# Patient Record
Sex: MALE | Race: WHITE
[De-identification: names, ages, dates, MRNs, and addresses within clinical notes are randomized per-mention and may not be internally consistent; named-entity substitution may affect disease eponyms.]

---

## 2017-03-02 ENCOUNTER — RECORD ABSTRACTING (OUTPATIENT)
Age: 82
End: 2017-03-02

## 2017-03-02 DIAGNOSIS — Z87.891 PERSONAL HISTORY OF NICOTINE DEPENDENCE: ICD-10-CM

## 2017-03-02 DIAGNOSIS — Z85.038 PERSONAL HISTORY OF OTHER MALIGNANT NEOPLASM OF LARGE INTESTINE: ICD-10-CM

## 2017-03-02 DIAGNOSIS — Z83.42 FAMILY HISTORY OF FAMILIAL HYPERCHOLESTEROLEMIA: ICD-10-CM

## 2017-03-02 DIAGNOSIS — Z95.0 PRESENCE OF CARDIAC PACEMAKER: ICD-10-CM

## 2017-03-02 DIAGNOSIS — E78.5 HYPERLIPIDEMIA, UNSPECIFIED: ICD-10-CM

## 2017-03-02 DIAGNOSIS — Z82.49 FAMILY HISTORY OF ISCHEMIC HEART DISEASE AND OTHER DISEASES OF THE CIRCULATORY SYSTEM: ICD-10-CM

## 2017-03-02 DIAGNOSIS — I10 ESSENTIAL (PRIMARY) HYPERTENSION: ICD-10-CM

## 2019-02-15 ENCOUNTER — APPOINTMENT (OUTPATIENT)
Age: 84
End: 2019-02-15
Payer: MEDICARE

## 2019-02-15 VITALS
BODY MASS INDEX: 23.7 KG/M2 | WEIGHT: 160 LBS | DIASTOLIC BLOOD PRESSURE: 97 MMHG | HEART RATE: 70 BPM | SYSTOLIC BLOOD PRESSURE: 164 MMHG | HEIGHT: 69 IN

## 2019-02-15 DIAGNOSIS — R49.0 DYSPHONIA: ICD-10-CM

## 2019-02-15 DIAGNOSIS — R09.82 POSTNASAL DRIP: ICD-10-CM

## 2019-02-15 DIAGNOSIS — K21.9 GASTRO-ESOPHAGEAL REFLUX DISEASE W/OUT ESOPHAGITIS: ICD-10-CM

## 2019-02-15 PROCEDURE — 31575 DIAGNOSTIC LARYNGOSCOPY: CPT

## 2019-02-15 PROCEDURE — 99213 OFFICE O/P EST LOW 20 MIN: CPT | Mod: 25

## 2019-02-15 RX ORDER — METOPROLOL TARTRATE 25 MG/1
25 TABLET, FILM COATED ORAL
Refills: 0 | Status: ACTIVE | COMMUNITY

## 2019-02-15 RX ORDER — AZELASTINE HYDROCHLORIDE 137 UG/1
137 SPRAY, METERED NASAL
Refills: 0 | Status: ACTIVE | COMMUNITY

## 2019-02-15 RX ORDER — AZELASTINE HYDROCHLORIDE 137 UG/1
0.1 SPRAY, METERED NASAL TWICE DAILY
Qty: 1 | Refills: 5 | Status: ACTIVE | COMMUNITY
Start: 2019-02-15 | End: 1900-01-01

## 2019-02-15 RX ORDER — LOSARTAN POTASSIUM AND HYDROCHLOROTHIAZIDE 12.5; 5 MG/1; MG/1
50-12.5 TABLET ORAL
Refills: 0 | Status: ACTIVE | COMMUNITY

## 2019-02-15 RX ORDER — AMLODIPINE BESYLATE 5 MG/1
5 TABLET ORAL
Refills: 0 | Status: ACTIVE | COMMUNITY

## 2019-02-15 NOTE — HISTORY OF PRESENT ILLNESS
[de-identified] : co pnd and hoarseness\par food sticking in throat at times every 2-3 days\par thick mucous throat\par similar co evaluated 19 mo ago

## 2019-02-15 NOTE — ASSESSMENT
[FreeTextEntry1] : exam unremarkable \par mild esophageal motility disorder\par pnd\par renew azelastine spray

## 2019-02-15 NOTE — PHYSICAL EXAM
[Laryngoscopy Performed] : laryngoscopy was performed, see procedure section for findings [Normal] : no rashes

## 2019-02-15 NOTE — PROCEDURE
[de-identified] : Scope # 13\par Topical anesthesia with viscous xylocaine 2% is applied to the anterior nares.\par A flexible fiberoptic laryngoscope is than introduced through the nares.\par The nasopharynx is clear without mass or inflammation.\par The posterior pharyngeal wall is unremarkable.\par The tongue base and vallecula are unremarkable.\par The supraglottic larynx is within normal limits.\par Both vocal cords are fully mobile with no nodule, polyp or other lesion present.\par There is no edema or erythema overlying the arytenoid cartilages or the inter-arytenoid space.\par The subglottic space is clear.\par The voice has a  mild raspy quality.\par

## 2019-02-15 NOTE — REVIEW OF SYSTEMS
[Post Nasal Drip] : post nasal drip [Hearing Loss] : hearing loss [Problem Snoring] : problem snoring [Hoarseness] : hoarseness [Throat Clearing] : throat clearing [Negative] : Heme/Lymph [Patient Intake Form Reviewed] : Patient intake form was reviewed [FreeTextEntry1] : difficulty swallowing  joint acches  muscle aches  itching

## 2020-04-10 ENCOUNTER — APPOINTMENT (OUTPATIENT)
Dept: GASTROENTEROLOGY | Facility: CLINIC | Age: 85
End: 2020-04-10
Payer: MEDICARE

## 2020-04-10 PROCEDURE — 99441: CPT

## 2020-07-29 ENCOUNTER — APPOINTMENT (OUTPATIENT)
Dept: GASTROENTEROLOGY | Facility: CLINIC | Age: 85
End: 2020-07-29
Payer: MEDICARE

## 2020-07-29 VITALS
DIASTOLIC BLOOD PRESSURE: 75 MMHG | HEART RATE: 84 BPM | HEIGHT: 69 IN | WEIGHT: 140 LBS | BODY MASS INDEX: 20.73 KG/M2 | SYSTOLIC BLOOD PRESSURE: 170 MMHG

## 2020-07-29 DIAGNOSIS — K59.09 OTHER CONSTIPATION: ICD-10-CM

## 2020-07-29 PROCEDURE — 99213 OFFICE O/P EST LOW 20 MIN: CPT

## 2020-07-29 RX ORDER — HYDROCHLOROTHIAZIDE 25 MG/1
25 TABLET ORAL
Refills: 0 | Status: ACTIVE | COMMUNITY

## 2020-07-29 NOTE — ASSESSMENT
[FreeTextEntry1] : 89 yo male with constipation ?medication related. Will increase water and fiber. Add daily Benefiber to regimen. Patient to call back if no improvement in symptoms.

## 2020-07-29 NOTE — HISTORY OF PRESENT ILLNESS
[de-identified] : 89 yo male with history of colon cancer status post resection several years ago. Patient was started on medications for overactive bladder and has noted some alternating constipation and diarrhea. There's been no other significant change in his medical history. Weight has remained stable. Patient does not wish to pursue invasive workup.

## 2021-01-01 ENCOUNTER — TRANSCRIPTION ENCOUNTER (OUTPATIENT)
Age: 86
End: 2021-01-01

## 2021-01-01 ENCOUNTER — OUTPATIENT (OUTPATIENT)
Dept: INPATIENT UNIT | Facility: HOSPITAL | Age: 86
LOS: 1 days | Discharge: HOME | End: 2021-01-01
Payer: MEDICARE

## 2021-01-01 ENCOUNTER — APPOINTMENT (OUTPATIENT)
Dept: DISASTER EMERGENCY | Facility: CLINIC | Age: 86
End: 2021-01-01

## 2021-01-01 ENCOUNTER — OUTPATIENT (OUTPATIENT)
Dept: OUTPATIENT SERVICES | Facility: HOSPITAL | Age: 86
LOS: 1 days | End: 2021-01-01
Payer: MEDICARE

## 2021-01-01 ENCOUNTER — RESULT REVIEW (OUTPATIENT)
Age: 86
End: 2021-01-01

## 2021-01-01 ENCOUNTER — EMERGENCY (EMERGENCY)
Facility: HOSPITAL | Age: 86
LOS: 0 days | Discharge: HOME | End: 2021-09-18
Attending: EMERGENCY MEDICINE
Payer: MEDICARE

## 2021-01-01 ENCOUNTER — EMERGENCY (EMERGENCY)
Facility: HOSPITAL | Age: 86
LOS: 0 days | Discharge: HOME | End: 2021-09-24
Attending: EMERGENCY MEDICINE
Payer: MEDICARE

## 2021-01-01 VITALS
DIASTOLIC BLOOD PRESSURE: 87 MMHG | TEMPERATURE: 98 F | OXYGEN SATURATION: 98 % | RESPIRATION RATE: 17 BRPM | HEART RATE: 68 BPM | SYSTOLIC BLOOD PRESSURE: 144 MMHG

## 2021-01-01 VITALS
OXYGEN SATURATION: 100 % | HEIGHT: 69 IN | WEIGHT: 132.28 LBS | SYSTOLIC BLOOD PRESSURE: 123 MMHG | DIASTOLIC BLOOD PRESSURE: 75 MMHG | HEART RATE: 67 BPM | RESPIRATION RATE: 15 BRPM | TEMPERATURE: 97 F

## 2021-01-01 VITALS
TEMPERATURE: 97 F | OXYGEN SATURATION: 99 % | SYSTOLIC BLOOD PRESSURE: 116 MMHG | RESPIRATION RATE: 18 BRPM | HEART RATE: 62 BPM | DIASTOLIC BLOOD PRESSURE: 66 MMHG

## 2021-01-01 VITALS
HEIGHT: 69 IN | TEMPERATURE: 97 F | WEIGHT: 139.99 LBS | DIASTOLIC BLOOD PRESSURE: 92 MMHG | OXYGEN SATURATION: 97 % | SYSTOLIC BLOOD PRESSURE: 149 MMHG | RESPIRATION RATE: 20 BRPM | HEART RATE: 70 BPM

## 2021-01-01 VITALS
SYSTOLIC BLOOD PRESSURE: 141 MMHG | RESPIRATION RATE: 18 BRPM | HEART RATE: 76 BPM | TEMPERATURE: 98 F | DIASTOLIC BLOOD PRESSURE: 72 MMHG | OXYGEN SATURATION: 99 %

## 2021-01-01 VITALS — HEIGHT: 69 IN | WEIGHT: 139.99 LBS

## 2021-01-01 DIAGNOSIS — Z96.643 PRESENCE OF ARTIFICIAL HIP JOINT, BILATERAL: Chronic | ICD-10-CM

## 2021-01-01 DIAGNOSIS — Z96.643 PRESENCE OF ARTIFICIAL HIP JOINT, BILATERAL: ICD-10-CM

## 2021-01-01 DIAGNOSIS — Z90.49 ACQUIRED ABSENCE OF OTHER SPECIFIED PARTS OF DIGESTIVE TRACT: Chronic | ICD-10-CM

## 2021-01-01 DIAGNOSIS — Z98.890 OTHER SPECIFIED POSTPROCEDURAL STATES: Chronic | ICD-10-CM

## 2021-01-01 DIAGNOSIS — Z95.0 PRESENCE OF CARDIAC PACEMAKER: Chronic | ICD-10-CM

## 2021-01-01 DIAGNOSIS — Z85.038 PERSONAL HISTORY OF OTHER MALIGNANT NEOPLASM OF LARGE INTESTINE: ICD-10-CM

## 2021-01-01 DIAGNOSIS — I45.9 CONDUCTION DISORDER, UNSPECIFIED: ICD-10-CM

## 2021-01-01 DIAGNOSIS — Z88.0 ALLERGY STATUS TO PENICILLIN: ICD-10-CM

## 2021-01-01 DIAGNOSIS — S01.81XD LACERATION WITHOUT FOREIGN BODY OF OTHER PART OF HEAD, SUBSEQUENT ENCOUNTER: ICD-10-CM

## 2021-01-01 DIAGNOSIS — Z95.0 PRESENCE OF CARDIAC PACEMAKER: ICD-10-CM

## 2021-01-01 DIAGNOSIS — Z90.49 ACQUIRED ABSENCE OF OTHER SPECIFIED PARTS OF DIGESTIVE TRACT: ICD-10-CM

## 2021-01-01 DIAGNOSIS — K43.6 OTHER AND UNSPECIFIED VENTRAL HERNIA WITH OBSTRUCTION, WITHOUT GANGRENE: ICD-10-CM
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
## 2021-01-01 DIAGNOSIS — Z98.890 OTHER SPECIFIED POSTPROCEDURAL STATES: ICD-10-CM

## 2021-01-01 DIAGNOSIS — K43.9 VENTRAL HERNIA WITHOUT OBSTRUCTION OR GANGRENE: ICD-10-CM

## 2021-01-01 DIAGNOSIS — Z01.818 ENCOUNTER FOR OTHER PREPROCEDURAL EXAMINATION: ICD-10-CM
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
## 2021-01-01 DIAGNOSIS — S61.011A LACERATION WITHOUT FOREIGN BODY OF RIGHT THUMB WITHOUT DAMAGE TO NAIL, INITIAL ENCOUNTER: ICD-10-CM

## 2021-01-01 DIAGNOSIS — S01.81XA LACERATION WITHOUT FOREIGN BODY OF OTHER PART OF HEAD, INITIAL ENCOUNTER: ICD-10-CM

## 2021-01-01 DIAGNOSIS — S09.90XA UNSPECIFIED INJURY OF HEAD, INITIAL ENCOUNTER: ICD-10-CM

## 2021-01-01 DIAGNOSIS — G62.9 POLYNEUROPATHY, UNSPECIFIED: ICD-10-CM

## 2021-01-01 DIAGNOSIS — I10 ESSENTIAL (PRIMARY) HYPERTENSION: ICD-10-CM

## 2021-01-01 DIAGNOSIS — E78.5 HYPERLIPIDEMIA, UNSPECIFIED: ICD-10-CM

## 2021-01-01 DIAGNOSIS — S01.111A LACERATION WITHOUT FOREIGN BODY OF RIGHT EYELID AND PERIOCULAR AREA, INITIAL ENCOUNTER: ICD-10-CM

## 2021-01-01 LAB
ANION GAP SERPL CALC-SCNC: 5 MMOL/L — SIGNIFICANT CHANGE UP (ref 5–17)
BASOPHILS # BLD AUTO: 0.02 K/UL — SIGNIFICANT CHANGE UP (ref 0–0.2)
BASOPHILS NFR BLD AUTO: 0.3 % — SIGNIFICANT CHANGE UP (ref 0–2)
BUN SERPL-MCNC: 33 MG/DL — HIGH (ref 7–23)
CALCIUM SERPL-MCNC: 10.9 MG/DL — HIGH (ref 8.5–10.1)
CHLORIDE SERPL-SCNC: 103 MMOL/L — SIGNIFICANT CHANGE UP (ref 96–108)
CO2 SERPL-SCNC: 26 MMOL/L — SIGNIFICANT CHANGE UP (ref 22–31)
CREAT SERPL-MCNC: 1.55 MG/DL — HIGH (ref 0.5–1.3)
EOSINOPHIL # BLD AUTO: 0.04 K/UL — SIGNIFICANT CHANGE UP (ref 0–0.5)
EOSINOPHIL NFR BLD AUTO: 0.6 % — SIGNIFICANT CHANGE UP (ref 0–6)
GLUCOSE SERPL-MCNC: 91 MG/DL — SIGNIFICANT CHANGE UP (ref 70–99)
HCT VFR BLD CALC: 41.1 % — SIGNIFICANT CHANGE UP (ref 39–50)
HGB BLD-MCNC: 13.4 G/DL — SIGNIFICANT CHANGE UP (ref 13–17)
IMM GRANULOCYTES NFR BLD AUTO: 0.3 % — SIGNIFICANT CHANGE UP (ref 0–1.5)
LYMPHOCYTES # BLD AUTO: 0.73 K/UL — LOW (ref 1–3.3)
LYMPHOCYTES # BLD AUTO: 11 % — LOW (ref 13–44)
MCHC RBC-ENTMCNC: 30.2 PG — SIGNIFICANT CHANGE UP (ref 27–34)
MCHC RBC-ENTMCNC: 32.6 GM/DL — SIGNIFICANT CHANGE UP (ref 32–36)
MCV RBC AUTO: 92.6 FL — SIGNIFICANT CHANGE UP (ref 80–100)
MONOCYTES # BLD AUTO: 0.61 K/UL — SIGNIFICANT CHANGE UP (ref 0–0.9)
MONOCYTES NFR BLD AUTO: 9.2 % — SIGNIFICANT CHANGE UP (ref 2–14)
MRSA PCR RESULT.: SIGNIFICANT CHANGE UP
NEUTROPHILS # BLD AUTO: 5.22 K/UL — SIGNIFICANT CHANGE UP (ref 1.8–7.4)
NEUTROPHILS NFR BLD AUTO: 78.6 % — HIGH (ref 43–77)
PLATELET # BLD AUTO: 172 K/UL — SIGNIFICANT CHANGE UP (ref 150–400)
POTASSIUM SERPL-MCNC: 4.1 MMOL/L — SIGNIFICANT CHANGE UP (ref 3.5–5.3)
POTASSIUM SERPL-SCNC: 4.1 MMOL/L — SIGNIFICANT CHANGE UP (ref 3.5–5.3)
RBC # BLD: 4.44 M/UL — SIGNIFICANT CHANGE UP (ref 4.2–5.8)
RBC # FLD: 14.6 % — HIGH (ref 10.3–14.5)
S AUREUS DNA NOSE QL NAA+PROBE: SIGNIFICANT CHANGE UP
SARS-COV-2 N GENE NPH QL NAA+PROBE: NOT DETECTED
SODIUM SERPL-SCNC: 134 MMOL/L — LOW (ref 135–145)
WBC # BLD: 6.64 K/UL — SIGNIFICANT CHANGE UP (ref 3.8–10.5)
WBC # FLD AUTO: 6.64 K/UL — SIGNIFICANT CHANGE UP (ref 3.8–10.5)

## 2021-01-01 PROCEDURE — 80048 BASIC METABOLIC PNL TOTAL CA: CPT

## 2021-01-01 PROCEDURE — 86921 COMPATIBILITY TEST INCUBATE: CPT

## 2021-01-01 PROCEDURE — 86077 PHYS BLOOD BANK SERV XMATCH: CPT

## 2021-01-01 PROCEDURE — 87641 MR-STAPH DNA AMP PROBE: CPT

## 2021-01-01 PROCEDURE — 86870 RBC ANTIBODY IDENTIFICATION: CPT

## 2021-01-01 PROCEDURE — 88302 TISSUE EXAM BY PATHOLOGIST: CPT | Mod: 26

## 2021-01-01 PROCEDURE — 72125 CT NECK SPINE W/O DYE: CPT

## 2021-01-01 PROCEDURE — 86922 COMPATIBILITY TEST ANTIGLOB: CPT

## 2021-01-01 PROCEDURE — 49560: CPT | Mod: AS

## 2021-01-01 PROCEDURE — 88302 TISSUE EXAM BY PATHOLOGIST: CPT

## 2021-01-01 PROCEDURE — L9995: CPT

## 2021-01-01 PROCEDURE — G0463: CPT

## 2021-01-01 PROCEDURE — 86850 RBC ANTIBODY SCREEN: CPT

## 2021-01-01 PROCEDURE — 12014 RPR F/E/E/N/L/M 5.1-7.5 CM: CPT

## 2021-01-01 PROCEDURE — 85025 COMPLETE CBC W/AUTO DIFF WBC: CPT

## 2021-01-01 PROCEDURE — 86902 BLOOD TYPE ANTIGEN DONOR EA: CPT

## 2021-01-01 PROCEDURE — 36415 COLL VENOUS BLD VENIPUNCTURE: CPT

## 2021-01-01 PROCEDURE — 87640 STAPH A DNA AMP PROBE: CPT

## 2021-01-01 PROCEDURE — 70450 CT HEAD/BRAIN W/O DYE: CPT | Mod: 26,MA

## 2021-01-01 PROCEDURE — 99284 EMERGENCY DEPT VISIT MOD MDM: CPT | Mod: 25

## 2021-01-01 PROCEDURE — 86920 COMPATIBILITY TEST SPIN: CPT

## 2021-01-01 PROCEDURE — C1781: CPT

## 2021-01-01 PROCEDURE — 49568: CPT | Mod: AS

## 2021-01-01 PROCEDURE — 86880 COOMBS TEST DIRECT: CPT

## 2021-01-01 PROCEDURE — 72125 CT NECK SPINE W/O DYE: CPT | Mod: 26,MA

## 2021-01-01 PROCEDURE — 86900 BLOOD TYPING SEROLOGIC ABO: CPT

## 2021-01-01 PROCEDURE — 86901 BLOOD TYPING SEROLOGIC RH(D): CPT

## 2021-01-01 PROCEDURE — 70450 CT HEAD/BRAIN W/O DYE: CPT

## 2021-01-01 RX ORDER — SODIUM CHLORIDE 9 MG/ML
1000 INJECTION, SOLUTION INTRAVENOUS
Refills: 0 | Status: DISCONTINUED | OUTPATIENT
Start: 2021-01-01 | End: 2021-01-01

## 2021-01-01 RX ORDER — FENTANYL CITRATE 50 UG/ML
25 INJECTION INTRAVENOUS
Refills: 0 | Status: DISCONTINUED | OUTPATIENT
Start: 2021-01-01 | End: 2021-01-01

## 2021-01-01 RX ORDER — ONDANSETRON 8 MG/1
4 TABLET, FILM COATED ORAL ONCE
Refills: 0 | Status: DISCONTINUED | OUTPATIENT
Start: 2021-01-01 | End: 2021-01-01

## 2021-01-01 RX ORDER — OXYCODONE HYDROCHLORIDE 5 MG/1
10 TABLET ORAL ONCE
Refills: 0 | Status: DISCONTINUED | OUTPATIENT
Start: 2021-01-01 | End: 2021-01-01

## 2021-01-01 RX ORDER — ACETAMINOPHEN 500 MG
1000 TABLET ORAL ONCE
Refills: 0 | Status: DISCONTINUED | OUTPATIENT
Start: 2021-01-01 | End: 2021-01-01

## 2021-01-01 RX ORDER — OXYCODONE HYDROCHLORIDE 5 MG/1
5 TABLET ORAL ONCE
Refills: 0 | Status: DISCONTINUED | OUTPATIENT
Start: 2021-01-01 | End: 2021-01-01

## 2021-01-01 RX ADMIN — SODIUM CHLORIDE 100 MILLILITER(S): 9 INJECTION, SOLUTION INTRAVENOUS at 14:54

## 2021-07-17 PROBLEM — Z01.818 PREOP TESTING: Status: ACTIVE | Noted: 2021-01-01

## 2021-07-19 NOTE — ASU PATIENT PROFILE, ADULT - PMH
BPH (benign prostatic hyperplasia)    Colon cancer  h/o chemo 2004  Heart block    HLD (hyperlipidemia)    HTN (hypertension)    Pacemaker    Ventral hernia

## 2021-07-19 NOTE — ASU PATIENT PROFILE, ADULT - PSH
Cardiac pacemaker    H/O colectomy  2004  H/O hernia repair    History of appendectomy  ruptured  History of carpal tunnel surgery of left wrist    History of total hip replacement, bilateral

## 2021-07-19 NOTE — CHART NOTE - NSCHARTNOTEFT_GEN_A_CORE
89 year old male presents to PST for planned ventral hernia repair    Plan:  1. PST instructions given ; NPO status instructions to be given by ASU   2. Pt instructed to take following meds with sip of water :   3. Pt instructed to take routine evening medications unless indicated   4. Stop NSAIDS ( Aspirin Alev Motrin Mobic Diclofenac), herbal supplements , MVI , Vitamin fish oil 7 days prior to surgery  unless   directed by surgeon or cardiologist;   5. Medical Optimization  with    6. EZ wash instructions given & mupirocin instructions given  7. Labs EKG CXR as per surgeon request   8. Pt instructed to self quarantine after Covid test   9. Covid Testing scheduled Pt notified and aware  10. Pt denies covid symptoms shortness of breath fever cough 89 year old male presents to PST for planned ventral hernia repair    Plan:  1. PST instructions given ; NPO status instructions to be given by ASU   2. Pt instructed to take following meds with sip of water : losartan metoprolol alfuzosin amlodipine   3. Pt instructed to take routine evening medications unless indicated   4. Stop NSAIDS ( Aspirin Alev Motrin Mobic Diclofenac), herbal supplements , MVI , Vitamin fish oil 7 days prior to surgery  unless   directed by surgeon or cardiologist;   5. Medical Optimization  with Dr Luigi Ronquillo   6. EZ wash instructions given & mupirocin instructions given  7. Labs EKG as per surgeon request   8. Pt instructed to self quarantine after Covid test   9. Covid Testing scheduled Pt notified and aware  10. Pt denies covid symptoms shortness of breath fever cough

## 2021-07-21 NOTE — ASU DISCHARGE PLAN (ADULT/PEDIATRIC) - CARE PROVIDER_API CALL
Jorge Pete  SURGERY  [address] [place] [address]  Phone: [phone]  Fax: [phone]  Follow Up Time:

## 2021-09-18 NOTE — ED PROVIDER NOTE - NSFOLLOWUPINSTRUCTIONS_ED_ALL_ED_FT
Please call and follow up with your doctor in 1-3 days.    You have 10 stitches that will need to be removed in 5 days.    Head Injury    WHAT YOU NEED TO KNOW:    A head injury is most often caused by a blow to the head. This may occur from a fall, bicycle injury, sports injury, being struck in the head, or a motor vehicle accident.     DISCHARGE INSTRUCTIONS:    Call 911 or have someone else call for any of the following:     You cannot be woken.      You have a seizure.      You stop responding to others or you faint.      You have blurry or double vision.      Your speech becomes slurred or confused.      You have arm or leg weakness, loss of feeling, or new problems with coordination.      Your pupils are larger than usual or one pupil is a different size than the other.       You have blood or clear fluid coming out of your ears or nose.    Return to the emergency department if:     You have repeated or forceful vomiting.      You feel confused.      Your headache gets worse or becomes severe.      You or someone caring for you notices that you are harder to wake than usual.    Contact your healthcare provider if:     Your symptoms last longer than 6 weeks after the injury.      You have questions or concerns about your condition or care.    Medicines:     Acetaminophen decreases pain. Acetaminophen is available without a doctor's order. Ask how much to take and how often to take it. Follow directions. Acetaminophen can cause liver damage if not taken correctly.      Take your medicine as directed. Contact your healthcare provider if you think your medicine is not helping or if you have side effects. Tell him or her if you are allergic to any medicine. Keep a list of the medicines, vitamins, and herbs you take. Include the amounts, and when and why you take them. Bring the list or the pill bottles to follow-up visits. Carry your medicine list with you in case of an emergency.    Self-care:     Rest or do quiet activities for 24 to 48 hours. Limit your time watching TV, using the computer, or doing tasks that require a lot of thinking. Slowly return to your normal activities as directed. Do not play sports or do activities that may cause you to get hit in the head. Ask your healthcare provider when you can return to sports.       Apply ice on your head for 15 to 20 minutes every hour or as directed. Use an ice pack, or put crushed ice in a plastic bag. Cover it with a towel before you apply it to your skin. Ice helps prevent tissue damage and decreases swelling and pain.       Have someone stay with you for 24 hours or as directed. This person can monitor you for complications and call 911. When you are awake the person should ask you a few questions to see if you are thinking clearly. An example would be to ask your name or your address.     Prevent another head injury:     Wear a helmet that fits properly. Do this when you play sports, or ride a bike, scooter, or skateboard. Helmets help decrease your risk of a serious head injury. Talk to your healthcare provider about other ways you can protect yourself if you play sports.      Wear your seat belt every time you are in a car. This helps to decrease your risk for a head injury if you are in a car accident.     Follow up with your healthcare provider as directed: Write down your questions so you remember to ask them during your visits.    Laceration    WHAT YOU NEED TO KNOW:    A laceration is an injury to the skin and the soft tissue underneath it. Lacerations happen when you are cut or hit by something. They can happen anywhere on the body.     DISCHARGE INSTRUCTIONS:    Return to the emergency department if:     You have heavy bleeding or bleeding that does not stop after 10 minutes of holding firm, direct pressure over the wound.       Your wound opens up.     Contact your healthcare provider if:     You have a fever or chills.       Your laceration is red, warm, or swollen.      You have red streaks on your skin coming from your wound.      You have white or yellow drainage from the wound that smells bad.      You have pain that gets worse, even after treatment.       You have questions or concerns about your condition or care.     Medicines:     Prescription pain medicine may be given. Ask how to take this medicine safely.       Antibiotics help treat or prevent a bacterial infection.       Take your medicine as directed. Contact your healthcare provider if you think your medicine is not helping or if you have side effects. Tell him or her if you are allergic to any medicine. Keep a list of the medicines, vitamins, and herbs you take. Include the amounts, and when and why you take them. Bring the list or the pill bottles to follow-up visits. Carry your medicine list with you in case of an emergency.    Care for your wound as directed:     Do not get your wound wet until your healthcare provider says it is okay. Do not soak your wound in water. Do not go swimming until your healthcare provider says it is okay. Carefully wash the wound with soap and water. Gently pat the area dry or allow it to air dry.       Change your bandages when they get wet, dirty, or after washing. Apply new, clean bandages as directed. Do not apply elastic bandages or tape too tight. Do not put powders or lotions over your incision.       Apply antibiotic ointment as directed. Your healthcare provider may give you antibiotic ointment to put over your wound if you have stitches. If you have strips of tape over your incision, let them dry up and fall off on their own. If they do not fall off within 14 days, gently remove them. If you have glue over your wound, do not remove or pick at it. If your glue comes off, do not replace it with glue that you have at home.       Check your wound every day for signs of infection such as swelling, redness, or pus.     Self-care:     Apply ice on your wound for 15 to 20 minutes every hour or as directed. Use an ice pack, or put crushed ice in a plastic bag. Cover it with a towel. Ice helps prevent tissue damage and decreases swelling and pain.      Use a splint as directed. A splint will decrease movement and stress on your wound. It may help it heal faster. A splint may be used for lacerations over joints or areas of your body that bend. Ask your healthcare provider how to apply and remove a splint.       Decrease scarring of your wound by applying ointments as directed. Do not apply ointments until your healthcare provider says it is okay. You may need to wait until your wound is healed. Ask which ointment to buy and how often to use it. After your wound is healed, use sunscreen over the area when you are out in the sun. You should do this for at least 6 months to 1 year after your injury.     Follow up with your healthcare provider as directed: You may need to follow up in 24 to 48 hours to have your wound checked for infection. You will need to return in 3 to 14 days if you have stitches or staples so they can be removed. Care for your wound as directed to prevent infection and help it heal. Write down your questions so you remember to ask them during your visits.

## 2021-09-18 NOTE — ED PROVIDER NOTE - PATIENT PORTAL LINK FT
You can access the FollowMyHealth Patient Portal offered by [facility] by registering at the following website: [email or web address]. By joining [employer]’s FollowMyHealth portal, you will also be able to view your health information using other applications (apps) compatible with our system.

## 2021-09-18 NOTE — ED PROCEDURE NOTE - ATTENDING CONTRIBUTION TO CARE
Attending Lawrence statement:  I was available in the Emergency Department throughout the entire procedure and I was present during the key portions of the lac repair

## 2021-09-18 NOTE — ED PROVIDER NOTE - NSSUBSTANCEUSE_GEN_ALL_CORE_SD
Identified pt with two pt identifiers(name and ). Reviewed record in preparation for visit and have obtained necessary documentation. No chief complaint on file. Health Maintenance Due   Topic    DTaP/Tdap/Td series (1 - Tdap)    FOBT Q 1 YEAR AGE 54-65     ZOSTER VACCINE AGE 60>     GLAUCOMA SCREENING Q2Y     Pneumococcal 65+ Low/Medium Risk (1 of 2 - PCV13)    MEDICARE YEARLY EXAM     Influenza Age 5 to Adult    Patient states he does not get flu vaccinations. Coordination of Care Questionnaire:  :   1) Have you been to an emergency room, urgent care clinic since your last visit? no   Hospitalized since your last visit? no             2. Have seen or consulted any other health care provider since your last visit? NO  If yes, where when, and reason for visit? 3) Do you have an Advanced Directive/ Living Will in place? NO  If yes, do we have a copy on file NO  If no, would you like information NO    Patient is accompanied by self I have received verbal consent from Citlalli Song III to discuss any/all medical information while they are present in the room. never used

## 2021-09-18 NOTE — ED PROVIDER NOTE - CARE PLAN
1 Principal Discharge DX:	Head injury  Secondary Diagnosis:	Facial laceration  Secondary Diagnosis:	Laceration of right thumb

## 2021-09-18 NOTE — ED PROVIDER NOTE - MUSCULOSKELETAL, MLM
Spine appears normal, range of motion is not limited, no muscle  tenderness. non TTP extremities,  non TTP to  thumb.

## 2021-09-18 NOTE — ED PROCEDURE NOTE - CPROC ED LACER REPAIR DETAIL1
The wound was explored to base in bloodless field./No foreign body/Multiple flaps were aligned./Non-extensive debridement was performed.

## 2021-09-18 NOTE — ED PROCEDURE NOTE - CPROC ED SITE PREP1
Miles Badillo is a 38 year old male patient.  There is no problem list on file for this patient.    Past Medical History:   Diagnosis Date   • Anxiety    • Sleep apnea    • Thyroid condition      Current Facility-Administered Medications   Medication Dose Route Frequency Provider Last Rate Last Dose   • AMIODarone (CORDARONE) 450 mg/250 mL dextrose 5% infusion  1 mg/min Intravenous Continuous Rao Gutierrez MD 16.7 mL/hr at 10/18/19 0802 0.5 mg/min at 10/18/19 0802   • metoPROLOL succinate (TOPROL-XL) ER tablet 150 mg  150 mg Oral BID Rao Gutierrez MD   150 mg at 10/18/19 0804   • dilTIAZem (TIAZAC,CARDIZEM CD) 24 hr capsule 360 mg  360 mg Oral Daily Rao Gutierrez MD   360 mg at 10/18/19 0804   • sertraline (ZOLOFT) tablet 25 mg  25 mg Oral Daily Scot Rodgers MD   25 mg at 10/18/19 0805   • LORazepam (ATIVAN) tablet 1 mg  1 mg Oral Q6H PRN Scot Rodgers MD   1 mg at 10/18/19 0805   • labetalol (NORMODYNE) injection 10 mg  10 mg Intravenous Q4H PRN Blake Crenshaw MD       • ketorolac (ACULAR) 0.5 % ophthalmic solution 1 drop  1 drop Left Eye 4x Daily Drew Bush MD       • sodium chloride 0.9 % flush bag 25 mL  25 mL Intravenous PRN Drew Bush MD       • sodium chloride (PF) 0.9 % injection 2 mL  2 mL Intracatheter 2 times per day Drew Bush MD   2 mL at 10/17/19 2145   • sodium chloride (NORMAL SALINE) 0.9 % bolus 500 mL  500 mL Intravenous PRN Drew Bush MD       • sodium chloride 0.9 % flush bag 500 mL  500 mL Intravenous PRN Drew Bush MD       • potassium CHLORIDE (KLOR-CON M) ruthann ER tablet 20 mEq  20 mEq Oral Q4H PRN Drew Bush MD   20 mEq at 10/18/19 0804   • potassium CHLORIDE (KLOR-CON) packet 20 mEq  20 mEq Per NG tube Q4H PRN Drew Bush MD       • potassium CHLORIDE 20 mEq/100mL IVPB premix  20 mEq Intravenous Q4H PRN Drew Bush MD       • potassium CHLORIDE (KLOR-CON M) ruthann ER tablet 40 mEq  40 mEq Oral Q4H PRN Drew  MEEK Bush MD       • potassium CHLORIDE (KLOR-CON) packet 40 mEq  40 mEq Per NG tube Q4H PRN Drew Bush MD       • potassium CHLORIDE 20 mEq/100mL IVPB premix  40 mEq Intravenous Q4H PRN Drew Bush MD       • magnesium sulfate 1 g in dextrose 5% 100 mL IVPB premix  1 g Intravenous Daily PRN Drew Bush MD       • magnesium sulfate 2 g in 50 mL premix IVPB  2 g Intravenous Daily PRN Drew Bush MD       • magnesium sulfate 2 g in 50 mL premix IVPB  2 g Intravenous Q4H PRN Drew Bush MD       • acetaminophen (TYLENOL) tablet 650 mg  650 mg Oral Q4H PRN Drew Bush MD       • prochlorperazine (COMPAZINE) injection 5 mg  5 mg Intravenous Q4H PRN Drew Bush MD       • enoxaparin (LOVENOX) injection 160 mg  1 mg/kg Subcutaneous Q12H Drew Bush MD   160 mg at 10/17/19 2145     ALLERGIES:  No Known Allergies  Active Problems:    * No active hospital problems. *    Blood pressure 165/89, pulse 133, temperature 98.4 °F (36.9 °C), temperature source Oral, resp. rate 18, height 5' 11\" (1.803 m), weight (!) 150.7 kg, SpO2 97 %.    Subjective:  Symptoms:  No shortness of breath or chest pain.      Objective:  Vital signs: (most recent): Blood pressure 165/89, pulse 133, temperature 98.4 °F (36.9 °C), temperature source Oral, resp. rate 18, height 5' 11\" (1.803 m), weight (!) 150.7 kg, SpO2 97 %.    Lungs:  Normal effort.  No rales or wheezes.    Heart: Tachycardia.  Irregular rhythm.  No murmur or gallop.   Extremities: There is no dependent edema.      Assessment:  (1. Atrial fibrillation-Persistent, and could not be electrically cardioverted to sinus rhythm yesterday. Rate not controlled,despite high dose of metoprolol and cardizem. Case discussed with Dr. Cantu ( electrophysiology). Per  Recommendations, we will increase amiodarone drip to 1 mg /minute for a total of 24 more hours, then change to high dose oral amiodarone .If rate is reasonably controlled, patient can be  discharged, to return to [facility] at a later day for another attempt at cardioversion. Continue Xarelto.).     Plan:   (See assessment section.  ).       Rao Gutierrez MD  10/18/2019     chlorhexidine

## 2021-09-18 NOTE — ED PROVIDER NOTE - PROGRESS NOTE DETAILS
Attending Lawrence, pt updated on results of ct scan.  pt to d/c home and follow up with PMD.  Pt need stitches to be removed in 5 days.

## 2021-09-18 NOTE — ED ADULT TRIAGE NOTE - CHIEF COMPLAINT QUOTE
Pt presents to er with complaints of falling forward and striking his head with laceration to right eye brow, pt denies loc, use of blood thinners, pt assessed by  and pt made neuro alert.

## 2021-09-18 NOTE — ED ADULT NURSE NOTE - OBJECTIVE STATEMENT
Patient BIBA complaining of fall. Patient complaining of fall approx 1 hour PTA, patient states he was walking dog, leash wrapped around ankle causing patient to fall foreward hitting R forehead, +laceration above R eye, bleeding controlled on arrival, -LOC, -anticoagulation. Patient complaining of pain to R forehead. Patient alert, able to move all extremities. Patient denies HA, dizziness, blurred vision, CP, SOB, GCS 15

## 2021-09-18 NOTE — ED ADULT NURSE NOTE - NSFALLRSKASSESSTYPE_ED_ALL_ED
Subjective:       Patient ID: Harriet Hernandez is a 70 y.o. female.    Chief Complaint: Follow-up    HPI     Here for a f/u.     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  Reports no more visual hallucinations but still seeing flashing lights and blurry vision.     bp elevated today.     Has chronic left lower back pain with radicular pain down to left anterior thigh > 3 months. Ps: 6-8/10 while on norco.      C/o chronic left cervical radicular pain > 1 year.  Taking norco for pain control. Ps: 6-8/10 while on norco. Needs rf of norco.  After reviewing la monitoring program, no aberrant behaviour noted.      Smoker. Re copd: reports coughing up more phlegm and wheezing more over the past 1 week. No fever.      Cad controlled. Sees Dr. Schofield           Review of Systems      Review of Systems   Constitutional: Negative for fever and chills.   HENT: Negative for hearing loss and neck stiffness.    Eyes: Negative for redness and itching.   Respiratory: Negative for cough and choking.    Cardiovascular: Negative for chest pain and leg swelling.  Abdomen: Negative for abdominal pain and blood in stool.   Genitourinary: Negative for dysuria and flank pain.   Neurological: Negative for light-headedness and headaches.   Hematological: Negative for adenopathy.   Psychiatric/Behavioral: Negative for behavioral problems.     Objective:      Physical Exam   Constitutional: She appears well-developed.   HENT:   Head: Normocephalic and atraumatic.   Eyes: Pupils are equal, round, and reactive to light. Conjunctivae are normal.   Neck: Normal range of motion.   Cardiovascular: Normal rate and regular rhythm.   No murmur heard.  Pulmonary/Chest: Effort normal and breath sounds normal.   Lymphadenopathy:     She has no cervical adenopathy.       Assessment:       1. Uncontrolled hypertension    2. COPD exacerbation    3. Left lumbar radiculopathy    4. Cervical radiculopathy    5. Tobacco abuse disorder    6.  Coronary artery disease involving native coronary artery of native heart without angina pectoris        Plan:       Uncontrolled hypertension    COPD exacerbation    Left lumbar radiculopathy    Cervical radiculopathy    Tobacco abuse disorder    Coronary artery disease involving native coronary artery of native heart without angina pectoris    Other orders  -     HYDROcodone-acetaminophen (NORCO)  mg per tablet; Take 1 tablet by mouth 3 (three) times daily as needed.  Dispense: 90 tablet; Refill: 0  -     HYDROcodone-acetaminophen (NORCO)  mg per tablet; Take 1 tablet by mouth 3 (three) times daily as needed.  Dispense: 90 tablet; Refill: 0  -     HYDROcodone-acetaminophen (NORCO)  mg per tablet; Take 1 tablet by mouth 3 (three) times daily as needed.  Dispense: 90 tablet; Refill: 0  -     lisinopril (PRINIVIL,ZESTRIL) 5 MG tablet; Take 1 tablet (5 mg total) by mouth once daily.  Dispense: 30 tablet; Refill: 11  -     predniSONE (DELTASONE) 10 MG tablet; Take 4 tablets (40 mg total) by mouth once daily for 2 days, THEN 3 tablets (30 mg total) once daily for 2 days, THEN 2 tablets (20 mg total) once daily for 2 days, THEN 1 tablet (10 mg total) once daily for 2 days, THEN stop.  Dispense: 20 tablet; Refill: 0  -     azithromycin (ZITHROMAX Z-PETR) 250 MG tablet; Take 2 tablets by mouth daily for first day, then 1 tablet by mouth daily for next 4 days.  Dispense: 6 tablet; Refill: 0            Plan:  Start lisinopril for bp control. Serial bp checks at home. Nurse bp check in 2-3 weeks  Start zpak and prednisone taper for copd exac  rf norco 10  Cont all other meds      Medication List with Changes/Refills   New Medications    AZITHROMYCIN (ZITHROMAX Z-PETR) 250 MG TABLET    Take 2 tablets by mouth daily for first day, then 1 tablet by mouth daily for next 4 days.    LISINOPRIL (PRINIVIL,ZESTRIL) 5 MG TABLET    Take 1 tablet (5 mg total) by mouth once daily.   Current Medications    ACETAMINOPHEN  (TYLENOL) 325 MG TABLET    Take 1 tablet (325 mg total) by mouth every 6 (six) hours as needed for Pain.    ALBUTEROL (PROVENTIL/VENTOLIN HFA) 90 MCG/ACTUATION INHALER    Inhale 2 puffs into the lungs every 4 (four) hours as needed for Wheezing. Rescue    AMLODIPINE (NORVASC) 5 MG TABLET    Take 1 tablet (5 mg total) by mouth once daily.    ASPIRIN (ECOTRIN) 81 MG EC TABLET    Take 81 mg by mouth once daily.    ATORVASTATIN (LIPITOR) 20 MG TABLET    Take 1 tablet (20 mg total) by mouth once daily.    CARVEDILOL (COREG) 6.25 MG TABLET    Take 1 tablet (6.25 mg total) by mouth 2 (two) times daily with meals.    CLOPIDOGREL (PLAVIX) 75 MG TABLET    Take 1 tablet (75 mg total) by mouth once daily.    DEXLANSOPRAZOLE (DEXILANT) 60 MG CAPSULE    Take 1 capsule (60 mg total) by mouth once daily.    DICLOFENAC SODIUM 1 % GEL    Apply 2 g topically once daily.    FENOFIBRATE (TRICOR) 54 MG TABLET    TAKE ONE TABLET BY MOUTH EVERY DAY    GABAPENTIN (NEURONTIN) 100 MG CAPSULE    Take 1 capsule (100 mg total) by mouth every evening.    NAPROXEN (EC NAPROSYN) 500 MG EC TABLET    Take 1 tablet (500 mg total) by mouth 2 (two) times daily with meals.    UMECLIDINIUM (INCRUSE ELLIPTA) 62.5 MCG/ACTUATION DSDV    Inhale 1 inhalation by mouth once a day. Controller   Changed and/or Refilled Medications    Modified Medication Previous Medication    HYDROCODONE-ACETAMINOPHEN (NORCO)  MG PER TABLET HYDROcodone-acetaminophen (NORCO)  mg per tablet       Take 1 tablet by mouth 3 (three) times daily as needed.    Take 1 tablet by mouth 3 (three) times daily as needed.    HYDROCODONE-ACETAMINOPHEN (NORCO)  MG PER TABLET HYDROcodone-acetaminophen (NORCO)  mg per tablet       Take 1 tablet by mouth 3 (three) times daily as needed.    Take 1 tablet by mouth 3 (three) times daily as needed.    HYDROCODONE-ACETAMINOPHEN (NORCO)  MG PER TABLET HYDROcodone-acetaminophen (NORCO)  mg per tablet       Take 1 tablet  by mouth 3 (three) times daily as needed.    Take 1 tablet by mouth 3 (three) times daily as needed.    PREDNISONE (DELTASONE) 10 MG TABLET predniSONE (DELTASONE) 10 MG tablet       Take 4 tablets (40 mg total) by mouth once daily for 2 days, THEN 3 tablets (30 mg total) once daily for 2 days, THEN 2 tablets (20 mg total) once daily for 2 days, THEN 1 tablet (10 mg total) once daily for 2 days, THEN stop.    Take 4 tabs by mouth daily for 2 days then Take 3 tabs daily for 2 days then Take 2 tabs daily for 2 days then Take 1 tab daily for 2 days then stop        Initial (On Arrival)

## 2021-09-18 NOTE — ED PROCEDURE NOTE - PROCEDURE ADDITIONAL DETAILS
V shaped laceration with two small lacerations medially  to it. Main laceration with 6 sutures placed. Small laceration superomedially with 3 stitches placed. Small laceration inferomedially with 1 stitch placed. Pt informed re:return to ed for suture removal.

## 2021-09-18 NOTE — ED PROVIDER NOTE - NSICDXPASTSURGICALHX_GEN_ALL_CORE_FT
PAST SURGICAL HISTORY:  Cardiac pacemaker     H/O colectomy 2004    H/O hernia repair     History of appendectomy ruptured    History of carpal tunnel surgery of left wrist     History of total hip replacement, bilateral

## 2021-09-18 NOTE — ED PROVIDER NOTE - NSICDXPASTMEDICALHX_GEN_ALL_CORE_FT
PAST MEDICAL HISTORY:  BPH (benign prostatic hyperplasia)     Colon cancer h/o chemo 2004    Heart block     HLD (hyperlipidemia)     HTN (hypertension)     Pacemaker     Ventral hernia

## 2021-09-18 NOTE — ED PROVIDER NOTE - CLINICAL SUMMARY MEDICAL DECISION MAKING FREE TEXT BOX
89 y/o male  with a PMHx of HLD, pacemaker, heart block,  colon CA, ventral hernia s/p hernia repair, BPH, appendectomy, s/p fall. Plan: CT scan and  laceration repair.

## 2021-09-18 NOTE — ED PROVIDER NOTE - SKIN, MLM
Skin normal color for race, warm, dry and intact. No evidence of rash. Stellate laceration to right forehead, avulsion laceration to right thumb.

## 2021-09-18 NOTE — ED PROVIDER NOTE - OBJECTIVE STATEMENT
89 y/o male  with a PMHx of HLD, pacemaker, heart block,  colon CA, ventral hernia s/p hernia repair, BPH, appendectomy presents to the ED s/p fall. States his dog wrapped  leash around him causing him to fall head firs. Immunizations  UTD. +pain  to right side head. No LOC.  Not on  blood thinners. Denies  n/v, CP, SOB.  No hx of smoking or drug use 89 y/o male  with a PMHx of HLD, pacemaker, heart block,  colon CA, ventral hernia s/p hernia repair, BPH, appendectomy presents to the ED s/p fall. States his dog wrapped  leash around him causing him to fall head.   Immunizations  UTD. +pain  to right side head. No LOC.  Not on  blood thinners. Denies  n/v, CP, SOB.  No hx of smoking or drug use

## 2021-09-19 PROBLEM — I45.9 CONDUCTION DISORDER, UNSPECIFIED: Chronic | Status: ACTIVE | Noted: 2021-01-01

## 2021-09-19 PROBLEM — E78.5 HYPERLIPIDEMIA, UNSPECIFIED: Chronic | Status: ACTIVE | Noted: 2021-01-01

## 2021-09-19 PROBLEM — N40.0 BENIGN PROSTATIC HYPERPLASIA WITHOUT LOWER URINARY TRACT SYMPTOMS: Chronic | Status: ACTIVE | Noted: 2021-01-01

## 2021-09-19 PROBLEM — Z95.0 PRESENCE OF CARDIAC PACEMAKER: Chronic | Status: ACTIVE | Noted: 2021-01-01

## 2021-09-19 PROBLEM — C18.9 MALIGNANT NEOPLASM OF COLON, UNSPECIFIED: Chronic | Status: ACTIVE | Noted: 2021-01-01

## 2021-09-19 PROBLEM — K43.9 VENTRAL HERNIA WITHOUT OBSTRUCTION OR GANGRENE: Chronic | Status: ACTIVE | Noted: 2021-01-01

## 2021-09-19 PROBLEM — I10 ESSENTIAL (PRIMARY) HYPERTENSION: Chronic | Status: ACTIVE | Noted: 2021-01-01

## 2021-09-24 NOTE — ED STATDOCS - SKIN, MLM
skin normal color for race, warm, dry and intact. skin normal color for race, warm, dry and intact. +well healed suture site right temple, no bleeding, no dehiscence, no drainage, no redness

## 2021-09-24 NOTE — ED STATDOCS - PATIENT PORTAL LINK FT
You can access the FollowMyHealth Patient Portal offered by [facility] by registering at the following website: [email or web address]. By joining [facility]’s FollowMyHealth portal, you will also be able to view your health information using other applications (apps) compatible with our system.

## 2021-09-24 NOTE — ED STATDOCS - NSFOLLOWUPINSTRUCTIONS_ED_ALL_ED_FT
Stitches Removal    WHAT YOU NEED TO KNOW:    Stitches are usually removed within 14 days, depending on the location of the wound. Your healthcare provider will tell you when to return to have your stitches removed. Your provider will use sterile forceps or tweezers to  the knot of each stitch. He or she will cut the stitch with scissors and pull the stitch out. You may feel a slight tug as the stitch comes out.    DISCHARGE INSTRUCTIONS:    Return to the emergency department if:    Your wound splits open or is starting to come apart.    You suddenly cannot move your injured joint.    You have sudden numbness around your wound.    You see red streaks coming from your wound.  Contact your healthcare provider if:    You have a fever and chills.    Your wound is red, warm, swollen, or leaking pus.    There is a bad smell coming from your wound.    You have increased pain in the wound area.    You have questions or concerns about your condition or care.  Care for the area after the stitches are removed:    Do not pull medical tape off. Your provider may place small strips of medical tape across your wound after the stitches have been removed. These strips will peel and fall of on their own. Do not pull them off.    Clean the area as directed. Carefully wash the area with soap and water. Pat the area dry with a clean towel. Check the area for signs of infection, such as redness, swelling, or pus. Also check that the wound is not coming apart.    Protect your wound. Your wound can swell, bleed, or split open if it is stretched or bumped. You may need to wear a bandage that supports your wound until it is completely healed.    Care for a scar. You may have a scar after the stitches are removed. Use sunblock if the area is exposed to the sun. Apply it every day after the stitches are removed. This will help prevent skin discoloration. Talk to your healthcare provider about medicines you can use to make the scar less visible. Some medicines are available without a prescription.  Follow up with your healthcare provider as directed: You may need to return in 3 to 5 days if the stitches are on your face. Stitches on your scalp need to be removed after 7 to 14 days. Stitches over joints may remain in place up to 14 days. Write down your questions so you remember to ask them during your visits.    Retiro de los puntos de sutura    LO QUE NECESITA SABER:    Los puntos se retiran generalmente dentro de los 14 días, según la ubicación de la herida. Owens médico le dirá cuándo volver para que le retiren los puntos. Owens médico usará fórceps o pinzas esterilizados para levantar el nudo de cada punto de sutura. Cortará el punto de sutura con tijeras y lo jalará hacia afuera. Usted podría sentir un leve tirón conforme vaya saliendo el punto.    INSTRUCCIONES SOBRE EL CHARLIE HOSPITALARIA:    Regrese a la ji de emergencias si:    La herida se abre o está comenzando a separarse.    Usted repentinamente no puede  la articulación que se lesionó.    Usted presenta entumecimiento repentino alrededor de owens herida.    Usted nota líneas acuna que salen de owens herida.  Comuníquese con owens médico si:    Usted tiene fiebre y escalofríos.    Owens herida se pone de color rain, caliente, se inflama o supura pus.    Owens herida tiene mal olor.    Usted siente más dolor en el área de la herida.    Usted tiene preguntas o inquietudes acerca de owens condición o cuidado.  Cuidado del área después de quitar los puntos:    No desprenda la cinta médica.El médico puede colocar pequeñas tiras de cinta médica sobre la herida después de que se hayan retirado los puntos de sutura. Estas tiras se desprenderán y se caerán solas. No se los quite.    Limpie el área sj se le indique.Lave cuidadosamente el área con agua y jabón. Seque el área dando palmadas con juventino toalla limpia. Revise el área para detectar signos de infección, sj enrojecimiento, hinchazón o pus. Compruebe también que la herida no se esté abriendo.    Proteja owens herida.Owens herida puede inflamarse, sangrar o abrirse si se estira o se golpea. Es posible que deba cubrirse la herida con un vendaje hasta que sane por completo.    Cuidado de la cicatriz.Puede tener juventino cicatriz después de que le quiten los puntos. Póngase protector solar si el área queda expuesta al sol. Aplíquelo todos los días después de que le retiren los puntos de sutura. [place] evitará que owens piel cambie de color. Hable con owens médico sobre los medicamentos que puede utilizar para que la cicatriz quede menos visible. Algunos medicamentos están disponibles sin receta médica.  Acuda a kamron consultas de control con owens médico según le indicaron.Es probable que usted necesite regresar en 3 a 5 días si tiene puntos de sutura en owens apple. Los puntos de sutura en owens cuero cabelludo necesitan retirarse después de 7 a 14 días. Los puntos de sutura en las articulaciones pueden permanecer en owens lugar hasta por 14 días. Anote kamron preguntas para que se acuerde de hacerlas zulma kamron visitas.

## 2021-09-24 NOTE — ED STATDOCS - OBJECTIVE STATEMENT
89 y/o male with PMHx of HTN, HLD, BPH presents to ED for suture removal from right forehead s/p fall. Denies any drainage from site. Denies any other concerns. States he feels intermittent headache since fall, was told he can have a concussion. Denies any other sx.

## 2021-09-24 NOTE — ED ADULT TRIAGE NOTE - CHIEF COMPLAINT QUOTE
pt here for suture removal to right eye brow. fall with suture placement on Saturday 9/18, no blood thinners

## 2021-09-24 NOTE — ED STATDOCS - PROGRESS NOTE DETAILS
Ly HOROWITZ Removed 10 sutures from skin of right eyebrow. no signs of skin infection. Wound is healing well.

## 2022-01-01 ENCOUNTER — INPATIENT (INPATIENT)
Facility: HOSPITAL | Age: 87
LOS: 16 days | DRG: 329 | End: 2022-05-18
Attending: SURGERY | Admitting: SURGERY
Payer: MEDICARE

## 2022-01-01 ENCOUNTER — RESULT REVIEW (OUTPATIENT)
Age: 87
End: 2022-01-01

## 2022-01-01 VITALS — HEART RATE: 55 BPM | OXYGEN SATURATION: 91 %

## 2022-01-01 VITALS — WEIGHT: 119.93 LBS | HEIGHT: 69 IN

## 2022-01-01 DIAGNOSIS — Z98.890 OTHER SPECIFIED POSTPROCEDURAL STATES: Chronic | ICD-10-CM

## 2022-01-01 DIAGNOSIS — Z90.49 ACQUIRED ABSENCE OF OTHER SPECIFIED PARTS OF DIGESTIVE TRACT: Chronic | ICD-10-CM

## 2022-01-01 DIAGNOSIS — K56.609 UNSPECIFIED INTESTINAL OBSTRUCTION, UNSPECIFIED AS TO PARTIAL VERSUS COMPLETE OBSTRUCTION: ICD-10-CM

## 2022-01-01 DIAGNOSIS — Z96.643 PRESENCE OF ARTIFICIAL HIP JOINT, BILATERAL: Chronic | ICD-10-CM

## 2022-01-01 DIAGNOSIS — Z95.0 PRESENCE OF CARDIAC PACEMAKER: Chronic | ICD-10-CM

## 2022-01-01 LAB
ADD ON TEST-SPECIMEN IN LAB: SIGNIFICANT CHANGE UP
ADD ON TEST-SPECIMEN IN LAB: SIGNIFICANT CHANGE UP
ALBUMIN SERPL ELPH-MCNC: 1.5 G/DL — LOW (ref 3.3–5)
ALBUMIN SERPL ELPH-MCNC: 1.5 G/DL — LOW (ref 3.3–5)
ALBUMIN SERPL ELPH-MCNC: 1.6 G/DL — LOW (ref 3.3–5)
ALBUMIN SERPL ELPH-MCNC: 1.6 G/DL — LOW (ref 3.3–5)
ALBUMIN SERPL ELPH-MCNC: 1.7 G/DL — LOW (ref 3.3–5)
ALBUMIN SERPL ELPH-MCNC: 1.7 G/DL — LOW (ref 3.3–5)
ALBUMIN SERPL ELPH-MCNC: 1.8 G/DL — LOW (ref 3.3–5)
ALBUMIN SERPL ELPH-MCNC: 1.9 G/DL — LOW (ref 3.3–5)
ALBUMIN SERPL ELPH-MCNC: 1.9 G/DL — LOW (ref 3.3–5)
ALBUMIN SERPL ELPH-MCNC: 2.3 G/DL — LOW (ref 3.3–5)
ALBUMIN SERPL ELPH-MCNC: 2.9 G/DL — LOW (ref 3.3–5)
ALBUMIN SERPL ELPH-MCNC: 4.1 G/DL — SIGNIFICANT CHANGE UP (ref 3.3–5)
ALP SERPL-CCNC: 104 U/L — SIGNIFICANT CHANGE UP (ref 40–120)
ALP SERPL-CCNC: 32 U/L — LOW (ref 40–120)
ALP SERPL-CCNC: 38 U/L — LOW (ref 40–120)
ALP SERPL-CCNC: 43 U/L — SIGNIFICANT CHANGE UP (ref 40–120)
ALP SERPL-CCNC: 69 U/L — SIGNIFICANT CHANGE UP (ref 40–120)
ALP SERPL-CCNC: 69 U/L — SIGNIFICANT CHANGE UP (ref 40–120)
ALP SERPL-CCNC: 71 U/L — SIGNIFICANT CHANGE UP (ref 40–120)
ALP SERPL-CCNC: 72 U/L — SIGNIFICANT CHANGE UP (ref 40–120)
ALP SERPL-CCNC: 80 U/L — SIGNIFICANT CHANGE UP (ref 40–120)
ALP SERPL-CCNC: 90 U/L — SIGNIFICANT CHANGE UP (ref 40–120)
ALP SERPL-CCNC: 90 U/L — SIGNIFICANT CHANGE UP (ref 40–120)
ALP SERPL-CCNC: 97 U/L — SIGNIFICANT CHANGE UP (ref 40–120)
ALT FLD-CCNC: 14 U/L — SIGNIFICANT CHANGE UP (ref 12–78)
ALT FLD-CCNC: 15 U/L — SIGNIFICANT CHANGE UP (ref 12–78)
ALT FLD-CCNC: 19 U/L — SIGNIFICANT CHANGE UP (ref 12–78)
ALT FLD-CCNC: 22 U/L — SIGNIFICANT CHANGE UP (ref 12–78)
ALT FLD-CCNC: 25 U/L — SIGNIFICANT CHANGE UP (ref 12–78)
ALT FLD-CCNC: 35 U/L — SIGNIFICANT CHANGE UP (ref 12–78)
ALT FLD-CCNC: 70 U/L — SIGNIFICANT CHANGE UP (ref 12–78)
ANION GAP SERPL CALC-SCNC: 11 MMOL/L — SIGNIFICANT CHANGE UP (ref 5–17)
ANION GAP SERPL CALC-SCNC: 3 MMOL/L — LOW (ref 5–17)
ANION GAP SERPL CALC-SCNC: 4 MMOL/L — LOW (ref 5–17)
ANION GAP SERPL CALC-SCNC: 5 MMOL/L — SIGNIFICANT CHANGE UP (ref 5–17)
ANION GAP SERPL CALC-SCNC: 6 MMOL/L — SIGNIFICANT CHANGE UP (ref 5–17)
ANION GAP SERPL CALC-SCNC: 7 MMOL/L — SIGNIFICANT CHANGE UP (ref 5–17)
ANION GAP SERPL CALC-SCNC: 8 MMOL/L — SIGNIFICANT CHANGE UP (ref 5–17)
ANION GAP SERPL CALC-SCNC: 9 MMOL/L — SIGNIFICANT CHANGE UP (ref 5–17)
APTT BLD: 44.3 SEC — HIGH (ref 27.5–35.5)
AST SERPL-CCNC: 17 U/L — SIGNIFICANT CHANGE UP (ref 15–37)
AST SERPL-CCNC: 18 U/L — SIGNIFICANT CHANGE UP (ref 15–37)
AST SERPL-CCNC: 19 U/L — SIGNIFICANT CHANGE UP (ref 15–37)
AST SERPL-CCNC: 21 U/L — SIGNIFICANT CHANGE UP (ref 15–37)
AST SERPL-CCNC: 23 U/L — SIGNIFICANT CHANGE UP (ref 15–37)
AST SERPL-CCNC: 23 U/L — SIGNIFICANT CHANGE UP (ref 15–37)
AST SERPL-CCNC: 35 U/L — SIGNIFICANT CHANGE UP (ref 15–37)
AST SERPL-CCNC: 61 U/L — HIGH (ref 15–37)
BASE EXCESS BLDA CALC-SCNC: -3.6 MMOL/L — LOW (ref -2–3)
BASOPHILS # BLD AUTO: 0 K/UL — SIGNIFICANT CHANGE UP (ref 0–0.2)
BASOPHILS # BLD AUTO: 0.01 K/UL — SIGNIFICANT CHANGE UP (ref 0–0.2)
BASOPHILS # BLD AUTO: 0.02 K/UL — SIGNIFICANT CHANGE UP (ref 0–0.2)
BASOPHILS # BLD AUTO: 0.03 K/UL — SIGNIFICANT CHANGE UP (ref 0–0.2)
BASOPHILS # BLD AUTO: 0.04 K/UL — SIGNIFICANT CHANGE UP (ref 0–0.2)
BASOPHILS # BLD AUTO: 0.04 K/UL — SIGNIFICANT CHANGE UP (ref 0–0.2)
BASOPHILS # BLD AUTO: 0.05 K/UL — SIGNIFICANT CHANGE UP (ref 0–0.2)
BASOPHILS NFR BLD AUTO: 0 % — SIGNIFICANT CHANGE UP (ref 0–2)
BASOPHILS NFR BLD AUTO: 0.1 % — SIGNIFICANT CHANGE UP (ref 0–2)
BASOPHILS NFR BLD AUTO: 0.2 % — SIGNIFICANT CHANGE UP (ref 0–2)
BASOPHILS NFR BLD AUTO: 0.2 % — SIGNIFICANT CHANGE UP (ref 0–2)
BASOPHILS NFR BLD AUTO: 0.3 % — SIGNIFICANT CHANGE UP (ref 0–2)
BASOPHILS NFR BLD AUTO: 0.3 % — SIGNIFICANT CHANGE UP (ref 0–2)
BASOPHILS NFR BLD AUTO: 0.6 % — SIGNIFICANT CHANGE UP (ref 0–2)
BASOPHILS NFR BLD AUTO: 0.6 % — SIGNIFICANT CHANGE UP (ref 0–2)
BASOPHILS NFR BLD AUTO: 0.7 % — SIGNIFICANT CHANGE UP (ref 0–2)
BILIRUB SERPL-MCNC: 0.2 MG/DL — SIGNIFICANT CHANGE UP (ref 0.2–1.2)
BILIRUB SERPL-MCNC: 0.3 MG/DL — SIGNIFICANT CHANGE UP (ref 0.2–1.2)
BILIRUB SERPL-MCNC: 0.4 MG/DL — SIGNIFICANT CHANGE UP (ref 0.2–1.2)
BILIRUB SERPL-MCNC: 0.6 MG/DL — SIGNIFICANT CHANGE UP (ref 0.2–1.2)
BILIRUB SERPL-MCNC: 0.7 MG/DL — SIGNIFICANT CHANGE UP (ref 0.2–1.2)
BILIRUB SERPL-MCNC: 0.8 MG/DL — SIGNIFICANT CHANGE UP (ref 0.2–1.2)
BUN SERPL-MCNC: 118 MG/DL — HIGH (ref 7–23)
BUN SERPL-MCNC: 121 MG/DL — HIGH (ref 7–23)
BUN SERPL-MCNC: 128 MG/DL — HIGH (ref 7–23)
BUN SERPL-MCNC: 138 MG/DL — HIGH (ref 7–23)
BUN SERPL-MCNC: 148 MG/DL — HIGH (ref 7–23)
BUN SERPL-MCNC: 30 MG/DL — HIGH (ref 7–23)
BUN SERPL-MCNC: 31 MG/DL — HIGH (ref 7–23)
BUN SERPL-MCNC: 33 MG/DL — HIGH (ref 7–23)
BUN SERPL-MCNC: 33 MG/DL — HIGH (ref 7–23)
BUN SERPL-MCNC: 35 MG/DL — HIGH (ref 7–23)
BUN SERPL-MCNC: 39 MG/DL — HIGH (ref 7–23)
BUN SERPL-MCNC: 39 MG/DL — HIGH (ref 7–23)
BUN SERPL-MCNC: 46 MG/DL — HIGH (ref 7–23)
BUN SERPL-MCNC: 49 MG/DL — HIGH (ref 7–23)
BUN SERPL-MCNC: 50 MG/DL — HIGH (ref 7–23)
BUN SERPL-MCNC: 51 MG/DL — HIGH (ref 7–23)
BUN SERPL-MCNC: 53 MG/DL — HIGH (ref 7–23)
BUN SERPL-MCNC: 54 MG/DL — HIGH (ref 7–23)
BUN SERPL-MCNC: 56 MG/DL — HIGH (ref 7–23)
BUN SERPL-MCNC: 60 MG/DL — HIGH (ref 7–23)
BUN SERPL-MCNC: 61 MG/DL — HIGH (ref 7–23)
BUN SERPL-MCNC: 78 MG/DL — HIGH (ref 7–23)
BUN SERPL-MCNC: 87 MG/DL — HIGH (ref 7–23)
CALCIUM SERPL-MCNC: 10 MG/DL — SIGNIFICANT CHANGE UP (ref 8.5–10.1)
CALCIUM SERPL-MCNC: 10.1 MG/DL — SIGNIFICANT CHANGE UP (ref 8.5–10.1)
CALCIUM SERPL-MCNC: 10.2 MG/DL — HIGH (ref 8.5–10.1)
CALCIUM SERPL-MCNC: 10.3 MG/DL — HIGH (ref 8.5–10.1)
CALCIUM SERPL-MCNC: 10.4 MG/DL — HIGH (ref 8.5–10.1)
CALCIUM SERPL-MCNC: 10.7 MG/DL — HIGH (ref 8.5–10.1)
CALCIUM SERPL-MCNC: 11.4 MG/DL — HIGH (ref 8.5–10.1)
CALCIUM SERPL-MCNC: 9.2 MG/DL — SIGNIFICANT CHANGE UP (ref 8.5–10.1)
CALCIUM SERPL-MCNC: 9.4 MG/DL — SIGNIFICANT CHANGE UP (ref 8.5–10.1)
CALCIUM SERPL-MCNC: 9.6 MG/DL — SIGNIFICANT CHANGE UP (ref 8.5–10.1)
CALCIUM SERPL-MCNC: 9.8 MG/DL — SIGNIFICANT CHANGE UP (ref 8.5–10.1)
CALCIUM SERPL-MCNC: 9.8 MG/DL — SIGNIFICANT CHANGE UP (ref 8.5–10.1)
CALCIUM SERPL-MCNC: 9.9 MG/DL — SIGNIFICANT CHANGE UP (ref 8.5–10.1)
CALCIUM SERPL-MCNC: 9.9 MG/DL — SIGNIFICANT CHANGE UP (ref 8.5–10.1)
CHLORIDE SERPL-SCNC: 100 MMOL/L — SIGNIFICANT CHANGE UP (ref 96–108)
CHLORIDE SERPL-SCNC: 106 MMOL/L — SIGNIFICANT CHANGE UP (ref 96–108)
CHLORIDE SERPL-SCNC: 109 MMOL/L — HIGH (ref 96–108)
CHLORIDE SERPL-SCNC: 111 MMOL/L — HIGH (ref 96–108)
CHLORIDE SERPL-SCNC: 111 MMOL/L — HIGH (ref 96–108)
CHLORIDE SERPL-SCNC: 112 MMOL/L — HIGH (ref 96–108)
CHLORIDE SERPL-SCNC: 113 MMOL/L — HIGH (ref 96–108)
CHLORIDE SERPL-SCNC: 114 MMOL/L — HIGH (ref 96–108)
CHLORIDE SERPL-SCNC: 115 MMOL/L — HIGH (ref 96–108)
CHLORIDE SERPL-SCNC: 116 MMOL/L — HIGH (ref 96–108)
CHLORIDE SERPL-SCNC: 116 MMOL/L — HIGH (ref 96–108)
CHLORIDE SERPL-SCNC: 117 MMOL/L — HIGH (ref 96–108)
CHLORIDE SERPL-SCNC: 118 MMOL/L — HIGH (ref 96–108)
CHLORIDE SERPL-SCNC: 119 MMOL/L — HIGH (ref 96–108)
CHLORIDE SERPL-SCNC: 120 MMOL/L — HIGH (ref 96–108)
CHLORIDE SERPL-SCNC: 120 MMOL/L — HIGH (ref 96–108)
CHLORIDE SERPL-SCNC: 121 MMOL/L — HIGH (ref 96–108)
CHOLEST SERPL-MCNC: 169 MG/DL — SIGNIFICANT CHANGE UP
CO2 BLDA-SCNC: 21 MMOL/L — SIGNIFICANT CHANGE UP (ref 19–24)
CO2 SERPL-SCNC: 18 MMOL/L — LOW (ref 22–31)
CO2 SERPL-SCNC: 18 MMOL/L — LOW (ref 22–31)
CO2 SERPL-SCNC: 21 MMOL/L — LOW (ref 22–31)
CO2 SERPL-SCNC: 21 MMOL/L — LOW (ref 22–31)
CO2 SERPL-SCNC: 22 MMOL/L — SIGNIFICANT CHANGE UP (ref 22–31)
CO2 SERPL-SCNC: 23 MMOL/L — SIGNIFICANT CHANGE UP (ref 22–31)
CO2 SERPL-SCNC: 24 MMOL/L — SIGNIFICANT CHANGE UP (ref 22–31)
CO2 SERPL-SCNC: 24 MMOL/L — SIGNIFICANT CHANGE UP (ref 22–31)
CO2 SERPL-SCNC: 25 MMOL/L — SIGNIFICANT CHANGE UP (ref 22–31)
CO2 SERPL-SCNC: 25 MMOL/L — SIGNIFICANT CHANGE UP (ref 22–31)
CO2 SERPL-SCNC: 26 MMOL/L — SIGNIFICANT CHANGE UP (ref 22–31)
CO2 SERPL-SCNC: 27 MMOL/L — SIGNIFICANT CHANGE UP (ref 22–31)
CO2 SERPL-SCNC: 28 MMOL/L — SIGNIFICANT CHANGE UP (ref 22–31)
CO2 SERPL-SCNC: 29 MMOL/L — SIGNIFICANT CHANGE UP (ref 22–31)
CO2 SERPL-SCNC: 31 MMOL/L — SIGNIFICANT CHANGE UP (ref 22–31)
CREAT SERPL-MCNC: 1.1 MG/DL — SIGNIFICANT CHANGE UP (ref 0.5–1.3)
CREAT SERPL-MCNC: 1.12 MG/DL — SIGNIFICANT CHANGE UP (ref 0.5–1.3)
CREAT SERPL-MCNC: 1.13 MG/DL — SIGNIFICANT CHANGE UP (ref 0.5–1.3)
CREAT SERPL-MCNC: 1.25 MG/DL — SIGNIFICANT CHANGE UP (ref 0.5–1.3)
CREAT SERPL-MCNC: 1.28 MG/DL — SIGNIFICANT CHANGE UP (ref 0.5–1.3)
CREAT SERPL-MCNC: 1.28 MG/DL — SIGNIFICANT CHANGE UP (ref 0.5–1.3)
CREAT SERPL-MCNC: 1.33 MG/DL — HIGH (ref 0.5–1.3)
CREAT SERPL-MCNC: 1.34 MG/DL — HIGH (ref 0.5–1.3)
CREAT SERPL-MCNC: 1.39 MG/DL — HIGH (ref 0.5–1.3)
CREAT SERPL-MCNC: 1.4 MG/DL — HIGH (ref 0.5–1.3)
CREAT SERPL-MCNC: 1.4 MG/DL — HIGH (ref 0.5–1.3)
CREAT SERPL-MCNC: 1.45 MG/DL — HIGH (ref 0.5–1.3)
CREAT SERPL-MCNC: 1.45 MG/DL — HIGH (ref 0.5–1.3)
CREAT SERPL-MCNC: 1.5 MG/DL — HIGH (ref 0.5–1.3)
CREAT SERPL-MCNC: 1.62 MG/DL — HIGH (ref 0.5–1.3)
CREAT SERPL-MCNC: 1.71 MG/DL — HIGH (ref 0.5–1.3)
CREAT SERPL-MCNC: 1.75 MG/DL — HIGH (ref 0.5–1.3)
CREAT SERPL-MCNC: 1.79 MG/DL — HIGH (ref 0.5–1.3)
CREAT SERPL-MCNC: 1.91 MG/DL — HIGH (ref 0.5–1.3)
CREAT SERPL-MCNC: 2.26 MG/DL — HIGH (ref 0.5–1.3)
CREAT SERPL-MCNC: 2.33 MG/DL — HIGH (ref 0.5–1.3)
CREAT SERPL-MCNC: 2.33 MG/DL — HIGH (ref 0.5–1.3)
CREAT SERPL-MCNC: 2.79 MG/DL — HIGH (ref 0.5–1.3)
CULTURE RESULTS: SIGNIFICANT CHANGE UP
CULTURE RESULTS: SIGNIFICANT CHANGE UP
D DIMER BLD IA.RAPID-MCNC: 4180 NG/ML DDU — HIGH
EGFR: 21 ML/MIN/1.73M2 — LOW
EGFR: 26 ML/MIN/1.73M2 — LOW
EGFR: 26 ML/MIN/1.73M2 — LOW
EGFR: 27 ML/MIN/1.73M2 — LOW
EGFR: 33 ML/MIN/1.73M2 — LOW
EGFR: 35 ML/MIN/1.73M2 — LOW
EGFR: 36 ML/MIN/1.73M2 — LOW
EGFR: 37 ML/MIN/1.73M2 — LOW
EGFR: 40 ML/MIN/1.73M2 — LOW
EGFR: 44 ML/MIN/1.73M2 — LOW
EGFR: 45 ML/MIN/1.73M2 — LOW
EGFR: 45 ML/MIN/1.73M2 — LOW
EGFR: 47 ML/MIN/1.73M2 — LOW
EGFR: 47 ML/MIN/1.73M2 — LOW
EGFR: 48 ML/MIN/1.73M2 — LOW
EGFR: 50 ML/MIN/1.73M2 — LOW
EGFR: 50 ML/MIN/1.73M2 — LOW
EGFR: 53 ML/MIN/1.73M2 — LOW
EGFR: 53 ML/MIN/1.73M2 — LOW
EGFR: 54 ML/MIN/1.73M2 — LOW
EGFR: 61 ML/MIN/1.73M2 — SIGNIFICANT CHANGE UP
EGFR: 62 ML/MIN/1.73M2 — SIGNIFICANT CHANGE UP
EGFR: 63 ML/MIN/1.73M2 — SIGNIFICANT CHANGE UP
EOSINOPHIL # BLD AUTO: 0 K/UL — SIGNIFICANT CHANGE UP (ref 0–0.5)
EOSINOPHIL # BLD AUTO: 0.01 K/UL — SIGNIFICANT CHANGE UP (ref 0–0.5)
EOSINOPHIL # BLD AUTO: 0.02 K/UL — SIGNIFICANT CHANGE UP (ref 0–0.5)
EOSINOPHIL # BLD AUTO: 0.02 K/UL — SIGNIFICANT CHANGE UP (ref 0–0.5)
EOSINOPHIL # BLD AUTO: 0.06 K/UL — SIGNIFICANT CHANGE UP (ref 0–0.5)
EOSINOPHIL # BLD AUTO: 0.11 K/UL — SIGNIFICANT CHANGE UP (ref 0–0.5)
EOSINOPHIL NFR BLD AUTO: 0 % — SIGNIFICANT CHANGE UP (ref 0–6)
EOSINOPHIL NFR BLD AUTO: 0.1 % — SIGNIFICANT CHANGE UP (ref 0–6)
EOSINOPHIL NFR BLD AUTO: 0.2 % — SIGNIFICANT CHANGE UP (ref 0–6)
EOSINOPHIL NFR BLD AUTO: 0.4 % — SIGNIFICANT CHANGE UP (ref 0–6)
EOSINOPHIL NFR BLD AUTO: 0.5 % — SIGNIFICANT CHANGE UP (ref 0–6)
EOSINOPHIL NFR BLD AUTO: 1.3 % — SIGNIFICANT CHANGE UP (ref 0–6)
GAS PNL BLDA: SIGNIFICANT CHANGE UP
GLUCOSE SERPL-MCNC: 103 MG/DL — HIGH (ref 70–99)
GLUCOSE SERPL-MCNC: 107 MG/DL — HIGH (ref 70–99)
GLUCOSE SERPL-MCNC: 112 MG/DL — HIGH (ref 70–99)
GLUCOSE SERPL-MCNC: 121 MG/DL — HIGH (ref 70–99)
GLUCOSE SERPL-MCNC: 122 MG/DL — HIGH (ref 70–99)
GLUCOSE SERPL-MCNC: 124 MG/DL — HIGH (ref 70–99)
GLUCOSE SERPL-MCNC: 126 MG/DL — HIGH (ref 70–99)
GLUCOSE SERPL-MCNC: 134 MG/DL — HIGH (ref 70–99)
GLUCOSE SERPL-MCNC: 135 MG/DL — HIGH (ref 70–99)
GLUCOSE SERPL-MCNC: 136 MG/DL — HIGH (ref 70–99)
GLUCOSE SERPL-MCNC: 141 MG/DL — HIGH (ref 70–99)
GLUCOSE SERPL-MCNC: 144 MG/DL — HIGH (ref 70–99)
GLUCOSE SERPL-MCNC: 147 MG/DL — HIGH (ref 70–99)
GLUCOSE SERPL-MCNC: 155 MG/DL — HIGH (ref 70–99)
GLUCOSE SERPL-MCNC: 155 MG/DL — HIGH (ref 70–99)
GLUCOSE SERPL-MCNC: 156 MG/DL — HIGH (ref 70–99)
GLUCOSE SERPL-MCNC: 157 MG/DL — HIGH (ref 70–99)
GLUCOSE SERPL-MCNC: 161 MG/DL — HIGH (ref 70–99)
GLUCOSE SERPL-MCNC: 163 MG/DL — HIGH (ref 70–99)
GLUCOSE SERPL-MCNC: 89 MG/DL — SIGNIFICANT CHANGE UP (ref 70–99)
GLUCOSE SERPL-MCNC: 91 MG/DL — SIGNIFICANT CHANGE UP (ref 70–99)
GLUCOSE SERPL-MCNC: 91 MG/DL — SIGNIFICANT CHANGE UP (ref 70–99)
GLUCOSE SERPL-MCNC: 97 MG/DL — SIGNIFICANT CHANGE UP (ref 70–99)
HCO3 BLDA-SCNC: 20 MMOL/L — LOW (ref 21–28)
HCT VFR BLD CALC: 29.5 % — LOW (ref 39–50)
HCT VFR BLD CALC: 30.8 % — LOW (ref 39–50)
HCT VFR BLD CALC: 32.5 % — LOW (ref 39–50)
HCT VFR BLD CALC: 32.9 % — LOW (ref 39–50)
HCT VFR BLD CALC: 33.5 % — LOW (ref 39–50)
HCT VFR BLD CALC: 34.9 % — LOW (ref 39–50)
HCT VFR BLD CALC: 35.5 % — LOW (ref 39–50)
HCT VFR BLD CALC: 36.9 % — LOW (ref 39–50)
HCT VFR BLD CALC: 37.2 % — LOW (ref 39–50)
HCT VFR BLD CALC: 37.8 % — LOW (ref 39–50)
HCT VFR BLD CALC: 37.9 % — LOW (ref 39–50)
HCT VFR BLD CALC: 38.9 % — LOW (ref 39–50)
HCT VFR BLD CALC: 38.9 % — LOW (ref 39–50)
HCT VFR BLD CALC: 39.2 % — SIGNIFICANT CHANGE UP (ref 39–50)
HCT VFR BLD CALC: 40.1 % — SIGNIFICANT CHANGE UP (ref 39–50)
HCT VFR BLD CALC: 40.9 % — SIGNIFICANT CHANGE UP (ref 39–50)
HCT VFR BLD CALC: 44.7 % — SIGNIFICANT CHANGE UP (ref 39–50)
HCT VFR BLD CALC: 45.1 % — SIGNIFICANT CHANGE UP (ref 39–50)
HCT VFR BLD CALC: 48 % — SIGNIFICANT CHANGE UP (ref 39–50)
HDLC SERPL-MCNC: 63 MG/DL — SIGNIFICANT CHANGE UP
HGB BLD-MCNC: 10.7 G/DL — LOW (ref 13–17)
HGB BLD-MCNC: 10.8 G/DL — LOW (ref 13–17)
HGB BLD-MCNC: 11 G/DL — LOW (ref 13–17)
HGB BLD-MCNC: 11.3 G/DL — LOW (ref 13–17)
HGB BLD-MCNC: 11.7 G/DL — LOW (ref 13–17)
HGB BLD-MCNC: 11.9 G/DL — LOW (ref 13–17)
HGB BLD-MCNC: 11.9 G/DL — LOW (ref 13–17)
HGB BLD-MCNC: 12.2 G/DL — LOW (ref 13–17)
HGB BLD-MCNC: 12.2 G/DL — LOW (ref 13–17)
HGB BLD-MCNC: 12.3 G/DL — LOW (ref 13–17)
HGB BLD-MCNC: 12.4 G/DL — LOW (ref 13–17)
HGB BLD-MCNC: 12.7 G/DL — LOW (ref 13–17)
HGB BLD-MCNC: 12.7 G/DL — LOW (ref 13–17)
HGB BLD-MCNC: 13.2 G/DL — SIGNIFICANT CHANGE UP (ref 13–17)
HGB BLD-MCNC: 14.4 G/DL — SIGNIFICANT CHANGE UP (ref 13–17)
HGB BLD-MCNC: 14.6 G/DL — SIGNIFICANT CHANGE UP (ref 13–17)
HGB BLD-MCNC: 14.8 G/DL — SIGNIFICANT CHANGE UP (ref 13–17)
HGB BLD-MCNC: 9.1 G/DL — LOW (ref 13–17)
HGB BLD-MCNC: 9.9 G/DL — LOW (ref 13–17)
IMM GRANULOCYTES NFR BLD AUTO: 0.2 % — SIGNIFICANT CHANGE UP (ref 0–1.5)
IMM GRANULOCYTES NFR BLD AUTO: 0.2 % — SIGNIFICANT CHANGE UP (ref 0–1.5)
IMM GRANULOCYTES NFR BLD AUTO: 0.4 % — SIGNIFICANT CHANGE UP (ref 0–1.5)
IMM GRANULOCYTES NFR BLD AUTO: 0.9 % — SIGNIFICANT CHANGE UP (ref 0–1.5)
IMM GRANULOCYTES NFR BLD AUTO: 0.9 % — SIGNIFICANT CHANGE UP (ref 0–1.5)
IMM GRANULOCYTES NFR BLD AUTO: 1.5 % — SIGNIFICANT CHANGE UP (ref 0–1.5)
INR BLD: 0.94 RATIO — SIGNIFICANT CHANGE UP (ref 0.88–1.16)
LACTATE SERPL-SCNC: 1.1 MMOL/L — SIGNIFICANT CHANGE UP (ref 0.7–2)
LACTATE SERPL-SCNC: 2.1 MMOL/L — HIGH (ref 0.7–2)
LIDOCAIN IGE QN: 167 U/L — SIGNIFICANT CHANGE UP (ref 73–393)
LIPID PNL WITH DIRECT LDL SERPL: 82 MG/DL — SIGNIFICANT CHANGE UP
LYMPHOCYTES # BLD AUTO: 0 % — LOW (ref 13–44)
LYMPHOCYTES # BLD AUTO: 0 K/UL — LOW (ref 1–3.3)
LYMPHOCYTES # BLD AUTO: 0.22 K/UL — LOW (ref 1–3.3)
LYMPHOCYTES # BLD AUTO: 0.31 K/UL — LOW (ref 1–3.3)
LYMPHOCYTES # BLD AUTO: 0.34 K/UL — LOW (ref 1–3.3)
LYMPHOCYTES # BLD AUTO: 0.35 K/UL — LOW (ref 1–3.3)
LYMPHOCYTES # BLD AUTO: 0.38 K/UL — LOW (ref 1–3.3)
LYMPHOCYTES # BLD AUTO: 0.47 K/UL — LOW (ref 1–3.3)
LYMPHOCYTES # BLD AUTO: 0.53 K/UL — LOW (ref 1–3.3)
LYMPHOCYTES # BLD AUTO: 0.66 K/UL — LOW (ref 1–3.3)
LYMPHOCYTES # BLD AUTO: 0.7 K/UL — LOW (ref 1–3.3)
LYMPHOCYTES # BLD AUTO: 0.72 K/UL — LOW (ref 1–3.3)
LYMPHOCYTES # BLD AUTO: 0.79 K/UL — LOW (ref 1–3.3)
LYMPHOCYTES # BLD AUTO: 0.9 % — LOW (ref 13–44)
LYMPHOCYTES # BLD AUTO: 12 % — LOW (ref 13–44)
LYMPHOCYTES # BLD AUTO: 13.7 % — SIGNIFICANT CHANGE UP (ref 13–44)
LYMPHOCYTES # BLD AUTO: 14.5 % — SIGNIFICANT CHANGE UP (ref 13–44)
LYMPHOCYTES # BLD AUTO: 3 % — LOW (ref 13–44)
LYMPHOCYTES # BLD AUTO: 3.4 % — LOW (ref 13–44)
LYMPHOCYTES # BLD AUTO: 3.4 % — LOW (ref 13–44)
LYMPHOCYTES # BLD AUTO: 4 % — LOW (ref 13–44)
LYMPHOCYTES # BLD AUTO: 5 % — LOW (ref 13–44)
LYMPHOCYTES # BLD AUTO: 5.7 % — LOW (ref 13–44)
LYMPHOCYTES # BLD AUTO: 7.2 % — LOW (ref 13–44)
MAGNESIUM SERPL-MCNC: 1.6 MG/DL — SIGNIFICANT CHANGE UP (ref 1.6–2.6)
MAGNESIUM SERPL-MCNC: 1.9 MG/DL — SIGNIFICANT CHANGE UP (ref 1.6–2.6)
MAGNESIUM SERPL-MCNC: 2 MG/DL — SIGNIFICANT CHANGE UP (ref 1.6–2.6)
MAGNESIUM SERPL-MCNC: 2.1 MG/DL — SIGNIFICANT CHANGE UP (ref 1.6–2.6)
MAGNESIUM SERPL-MCNC: 2.2 MG/DL — SIGNIFICANT CHANGE UP (ref 1.6–2.6)
MAGNESIUM SERPL-MCNC: 2.3 MG/DL — SIGNIFICANT CHANGE UP (ref 1.6–2.6)
MCHC RBC-ENTMCNC: 29.4 PG — SIGNIFICANT CHANGE UP (ref 27–34)
MCHC RBC-ENTMCNC: 29.5 PG — SIGNIFICANT CHANGE UP (ref 27–34)
MCHC RBC-ENTMCNC: 29.7 PG — SIGNIFICANT CHANGE UP (ref 27–34)
MCHC RBC-ENTMCNC: 29.9 PG — SIGNIFICANT CHANGE UP (ref 27–34)
MCHC RBC-ENTMCNC: 30 PG — SIGNIFICANT CHANGE UP (ref 27–34)
MCHC RBC-ENTMCNC: 30.1 PG — SIGNIFICANT CHANGE UP (ref 27–34)
MCHC RBC-ENTMCNC: 30.4 PG — SIGNIFICANT CHANGE UP (ref 27–34)
MCHC RBC-ENTMCNC: 30.4 PG — SIGNIFICANT CHANGE UP (ref 27–34)
MCHC RBC-ENTMCNC: 30.6 PG — SIGNIFICANT CHANGE UP (ref 27–34)
MCHC RBC-ENTMCNC: 30.8 GM/DL — LOW (ref 32–36)
MCHC RBC-ENTMCNC: 30.8 GM/DL — LOW (ref 32–36)
MCHC RBC-ENTMCNC: 31.1 PG — SIGNIFICANT CHANGE UP (ref 27–34)
MCHC RBC-ENTMCNC: 31.6 GM/DL — LOW (ref 32–36)
MCHC RBC-ENTMCNC: 31.6 GM/DL — LOW (ref 32–36)
MCHC RBC-ENTMCNC: 31.7 GM/DL — LOW (ref 32–36)
MCHC RBC-ENTMCNC: 31.9 GM/DL — LOW (ref 32–36)
MCHC RBC-ENTMCNC: 32 GM/DL — SIGNIFICANT CHANGE UP (ref 32–36)
MCHC RBC-ENTMCNC: 32.1 GM/DL — SIGNIFICANT CHANGE UP (ref 32–36)
MCHC RBC-ENTMCNC: 32.2 GM/DL — SIGNIFICANT CHANGE UP (ref 32–36)
MCHC RBC-ENTMCNC: 32.3 GM/DL — SIGNIFICANT CHANGE UP (ref 32–36)
MCHC RBC-ENTMCNC: 32.4 GM/DL — SIGNIFICANT CHANGE UP (ref 32–36)
MCHC RBC-ENTMCNC: 32.5 GM/DL — SIGNIFICANT CHANGE UP (ref 32–36)
MCHC RBC-ENTMCNC: 32.6 GM/DL — SIGNIFICANT CHANGE UP (ref 32–36)
MCHC RBC-ENTMCNC: 32.7 GM/DL — SIGNIFICANT CHANGE UP (ref 32–36)
MCHC RBC-ENTMCNC: 33 GM/DL — SIGNIFICANT CHANGE UP (ref 32–36)
MCHC RBC-ENTMCNC: 33.8 GM/DL — SIGNIFICANT CHANGE UP (ref 32–36)
MCV RBC AUTO: 90.5 FL — SIGNIFICANT CHANGE UP (ref 80–100)
MCV RBC AUTO: 91 FL — SIGNIFICANT CHANGE UP (ref 80–100)
MCV RBC AUTO: 91.8 FL — SIGNIFICANT CHANGE UP (ref 80–100)
MCV RBC AUTO: 91.8 FL — SIGNIFICANT CHANGE UP (ref 80–100)
MCV RBC AUTO: 92.7 FL — SIGNIFICANT CHANGE UP (ref 80–100)
MCV RBC AUTO: 92.8 FL — SIGNIFICANT CHANGE UP (ref 80–100)
MCV RBC AUTO: 92.9 FL — SIGNIFICANT CHANGE UP (ref 80–100)
MCV RBC AUTO: 93 FL — SIGNIFICANT CHANGE UP (ref 80–100)
MCV RBC AUTO: 93.1 FL — SIGNIFICANT CHANGE UP (ref 80–100)
MCV RBC AUTO: 93.2 FL — SIGNIFICANT CHANGE UP (ref 80–100)
MCV RBC AUTO: 93.7 FL — SIGNIFICANT CHANGE UP (ref 80–100)
MCV RBC AUTO: 93.9 FL — SIGNIFICANT CHANGE UP (ref 80–100)
MCV RBC AUTO: 94.5 FL — SIGNIFICANT CHANGE UP (ref 80–100)
MCV RBC AUTO: 94.5 FL — SIGNIFICANT CHANGE UP (ref 80–100)
MCV RBC AUTO: 94.7 FL — SIGNIFICANT CHANGE UP (ref 80–100)
MCV RBC AUTO: 95.2 FL — SIGNIFICANT CHANGE UP (ref 80–100)
MCV RBC AUTO: 95.3 FL — SIGNIFICANT CHANGE UP (ref 80–100)
MCV RBC AUTO: 97 FL — SIGNIFICANT CHANGE UP (ref 80–100)
MONOCYTES # BLD AUTO: 0.14 K/UL — SIGNIFICANT CHANGE UP (ref 0–0.9)
MONOCYTES # BLD AUTO: 0.36 K/UL — SIGNIFICANT CHANGE UP (ref 0–0.9)
MONOCYTES # BLD AUTO: 0.42 K/UL — SIGNIFICANT CHANGE UP (ref 0–0.9)
MONOCYTES # BLD AUTO: 0.44 K/UL — SIGNIFICANT CHANGE UP (ref 0–0.9)
MONOCYTES # BLD AUTO: 0.45 K/UL — SIGNIFICANT CHANGE UP (ref 0–0.9)
MONOCYTES # BLD AUTO: 0.52 K/UL — SIGNIFICANT CHANGE UP (ref 0–0.9)
MONOCYTES # BLD AUTO: 0.53 K/UL — SIGNIFICANT CHANGE UP (ref 0–0.9)
MONOCYTES # BLD AUTO: 0.56 K/UL — SIGNIFICANT CHANGE UP (ref 0–0.9)
MONOCYTES # BLD AUTO: 0.58 K/UL — SIGNIFICANT CHANGE UP (ref 0–0.9)
MONOCYTES # BLD AUTO: 0.62 K/UL — SIGNIFICANT CHANGE UP (ref 0–0.9)
MONOCYTES # BLD AUTO: 0.65 K/UL — SIGNIFICANT CHANGE UP (ref 0–0.9)
MONOCYTES # BLD AUTO: 0.69 K/UL — SIGNIFICANT CHANGE UP (ref 0–0.9)
MONOCYTES NFR BLD AUTO: 1.8 % — LOW (ref 2–14)
MONOCYTES NFR BLD AUTO: 11.4 % — SIGNIFICANT CHANGE UP (ref 2–14)
MONOCYTES NFR BLD AUTO: 13.2 % — SIGNIFICANT CHANGE UP (ref 2–14)
MONOCYTES NFR BLD AUTO: 2 % — SIGNIFICANT CHANGE UP (ref 2–14)
MONOCYTES NFR BLD AUTO: 3.7 % — SIGNIFICANT CHANGE UP (ref 2–14)
MONOCYTES NFR BLD AUTO: 3.8 % — SIGNIFICANT CHANGE UP (ref 2–14)
MONOCYTES NFR BLD AUTO: 4 % — SIGNIFICANT CHANGE UP (ref 2–14)
MONOCYTES NFR BLD AUTO: 4 % — SIGNIFICANT CHANGE UP (ref 2–14)
MONOCYTES NFR BLD AUTO: 5 % — SIGNIFICANT CHANGE UP (ref 2–14)
MONOCYTES NFR BLD AUTO: 5.3 % — SIGNIFICANT CHANGE UP (ref 2–14)
MONOCYTES NFR BLD AUTO: 5.9 % — SIGNIFICANT CHANGE UP (ref 2–14)
MONOCYTES NFR BLD AUTO: 6.6 % — SIGNIFICANT CHANGE UP (ref 2–14)
NEUTROPHILS # BLD AUTO: 10.13 K/UL — HIGH (ref 1.8–7.4)
NEUTROPHILS # BLD AUTO: 12.23 K/UL — HIGH (ref 1.8–7.4)
NEUTROPHILS # BLD AUTO: 14.33 K/UL — HIGH (ref 1.8–7.4)
NEUTROPHILS # BLD AUTO: 14.53 K/UL — HIGH (ref 1.8–7.4)
NEUTROPHILS # BLD AUTO: 2.39 K/UL — SIGNIFICANT CHANGE UP (ref 1.8–7.4)
NEUTROPHILS # BLD AUTO: 23.65 K/UL — HIGH (ref 1.8–7.4)
NEUTROPHILS # BLD AUTO: 25.86 K/UL — HIGH (ref 1.8–7.4)
NEUTROPHILS # BLD AUTO: 3.33 K/UL — SIGNIFICANT CHANGE UP (ref 1.8–7.4)
NEUTROPHILS # BLD AUTO: 3.78 K/UL — SIGNIFICANT CHANGE UP (ref 1.8–7.4)
NEUTROPHILS # BLD AUTO: 4.68 K/UL — SIGNIFICANT CHANGE UP (ref 1.8–7.4)
NEUTROPHILS # BLD AUTO: 7.41 K/UL — HIGH (ref 1.8–7.4)
NEUTROPHILS # BLD AUTO: 9.54 K/UL — HIGH (ref 1.8–7.4)
NEUTROPHILS NFR BLD AUTO: 69 % — SIGNIFICANT CHANGE UP (ref 43–77)
NEUTROPHILS NFR BLD AUTO: 72.1 % — SIGNIFICANT CHANGE UP (ref 43–77)
NEUTROPHILS NFR BLD AUTO: 73.3 % — SIGNIFICANT CHANGE UP (ref 43–77)
NEUTROPHILS NFR BLD AUTO: 82 % — HIGH (ref 43–77)
NEUTROPHILS NFR BLD AUTO: 86 % — HIGH (ref 43–77)
NEUTROPHILS NFR BLD AUTO: 86.1 % — HIGH (ref 43–77)
NEUTROPHILS NFR BLD AUTO: 86.3 % — HIGH (ref 43–77)
NEUTROPHILS NFR BLD AUTO: 87.3 % — HIGH (ref 43–77)
NEUTROPHILS NFR BLD AUTO: 90.5 % — HIGH (ref 43–77)
NEUTROPHILS NFR BLD AUTO: 91 % — HIGH (ref 43–77)
NEUTROPHILS NFR BLD AUTO: 95 % — HIGH (ref 43–77)
NEUTROPHILS NFR BLD AUTO: 96.3 % — HIGH (ref 43–77)
NON HDL CHOLESTEROL: 106 MG/DL — SIGNIFICANT CHANGE UP
NRBC # BLD: SIGNIFICANT CHANGE UP /100 WBCS (ref 0–0)
NT-PROBNP SERPL-SCNC: 4937 PG/ML — HIGH (ref 0–450)
NT-PROBNP SERPL-SCNC: 5169 PG/ML — HIGH (ref 0–450)
PCO2 BLDA: 33 MMHG — LOW (ref 35–48)
PH BLDA: 7.4 — SIGNIFICANT CHANGE UP (ref 7.35–7.45)
PHOSPHATE SERPL-MCNC: 2 MG/DL — LOW (ref 2.5–4.5)
PHOSPHATE SERPL-MCNC: 2.4 MG/DL — LOW (ref 2.5–4.5)
PHOSPHATE SERPL-MCNC: 2.5 MG/DL — SIGNIFICANT CHANGE UP (ref 2.5–4.5)
PHOSPHATE SERPL-MCNC: 2.6 MG/DL — SIGNIFICANT CHANGE UP (ref 2.5–4.5)
PHOSPHATE SERPL-MCNC: 2.6 MG/DL — SIGNIFICANT CHANGE UP (ref 2.5–4.5)
PHOSPHATE SERPL-MCNC: 2.7 MG/DL — SIGNIFICANT CHANGE UP (ref 2.5–4.5)
PHOSPHATE SERPL-MCNC: 2.7 MG/DL — SIGNIFICANT CHANGE UP (ref 2.5–4.5)
PHOSPHATE SERPL-MCNC: 2.8 MG/DL — SIGNIFICANT CHANGE UP (ref 2.5–4.5)
PHOSPHATE SERPL-MCNC: 2.8 MG/DL — SIGNIFICANT CHANGE UP (ref 2.5–4.5)
PHOSPHATE SERPL-MCNC: 3 MG/DL — SIGNIFICANT CHANGE UP (ref 2.5–4.5)
PHOSPHATE SERPL-MCNC: 3 MG/DL — SIGNIFICANT CHANGE UP (ref 2.5–4.5)
PHOSPHATE SERPL-MCNC: 3.4 MG/DL — SIGNIFICANT CHANGE UP (ref 2.5–4.5)
PHOSPHATE SERPL-MCNC: 4 MG/DL — SIGNIFICANT CHANGE UP (ref 2.5–4.5)
PHOSPHATE SERPL-MCNC: 4.3 MG/DL — SIGNIFICANT CHANGE UP (ref 2.5–4.5)
PHOSPHATE SERPL-MCNC: 6.8 MG/DL — HIGH (ref 2.5–4.5)
PLATELET # BLD AUTO: 116 K/UL — LOW (ref 150–400)
PLATELET # BLD AUTO: 121 K/UL — LOW (ref 150–400)
PLATELET # BLD AUTO: 133 K/UL — LOW (ref 150–400)
PLATELET # BLD AUTO: 140 K/UL — LOW (ref 150–400)
PLATELET # BLD AUTO: 150 K/UL — SIGNIFICANT CHANGE UP (ref 150–400)
PLATELET # BLD AUTO: 160 K/UL — SIGNIFICANT CHANGE UP (ref 150–400)
PLATELET # BLD AUTO: 176 K/UL — SIGNIFICANT CHANGE UP (ref 150–400)
PLATELET # BLD AUTO: 177 K/UL — SIGNIFICANT CHANGE UP (ref 150–400)
PLATELET # BLD AUTO: 185 K/UL — SIGNIFICANT CHANGE UP (ref 150–400)
PLATELET # BLD AUTO: 189 K/UL — SIGNIFICANT CHANGE UP (ref 150–400)
PLATELET # BLD AUTO: 189 K/UL — SIGNIFICANT CHANGE UP (ref 150–400)
PLATELET # BLD AUTO: 195 K/UL — SIGNIFICANT CHANGE UP (ref 150–400)
PLATELET # BLD AUTO: 199 K/UL — SIGNIFICANT CHANGE UP (ref 150–400)
PLATELET # BLD AUTO: 208 K/UL — SIGNIFICANT CHANGE UP (ref 150–400)
PLATELET # BLD AUTO: 216 K/UL — SIGNIFICANT CHANGE UP (ref 150–400)
PLATELET # BLD AUTO: 228 K/UL — SIGNIFICANT CHANGE UP (ref 150–400)
PLATELET # BLD AUTO: 237 K/UL — SIGNIFICANT CHANGE UP (ref 150–400)
PLATELET # BLD AUTO: 48 K/UL — LOW (ref 150–400)
PO2 BLDA: 80 MMHG — LOW (ref 83–108)
POTASSIUM SERPL-MCNC: 3.4 MMOL/L — LOW (ref 3.5–5.3)
POTASSIUM SERPL-MCNC: 3.5 MMOL/L — SIGNIFICANT CHANGE UP (ref 3.5–5.3)
POTASSIUM SERPL-MCNC: 3.6 MMOL/L — SIGNIFICANT CHANGE UP (ref 3.5–5.3)
POTASSIUM SERPL-MCNC: 3.7 MMOL/L — SIGNIFICANT CHANGE UP (ref 3.5–5.3)
POTASSIUM SERPL-MCNC: 3.8 MMOL/L — SIGNIFICANT CHANGE UP (ref 3.5–5.3)
POTASSIUM SERPL-MCNC: 3.9 MMOL/L — SIGNIFICANT CHANGE UP (ref 3.5–5.3)
POTASSIUM SERPL-MCNC: 4 MMOL/L — SIGNIFICANT CHANGE UP (ref 3.5–5.3)
POTASSIUM SERPL-MCNC: 4.1 MMOL/L — SIGNIFICANT CHANGE UP (ref 3.5–5.3)
POTASSIUM SERPL-MCNC: 4.2 MMOL/L — SIGNIFICANT CHANGE UP (ref 3.5–5.3)
POTASSIUM SERPL-MCNC: 4.2 MMOL/L — SIGNIFICANT CHANGE UP (ref 3.5–5.3)
POTASSIUM SERPL-MCNC: 4.3 MMOL/L — SIGNIFICANT CHANGE UP (ref 3.5–5.3)
POTASSIUM SERPL-MCNC: 4.3 MMOL/L — SIGNIFICANT CHANGE UP (ref 3.5–5.3)
POTASSIUM SERPL-MCNC: 4.6 MMOL/L — SIGNIFICANT CHANGE UP (ref 3.5–5.3)
POTASSIUM SERPL-MCNC: 4.8 MMOL/L — SIGNIFICANT CHANGE UP (ref 3.5–5.3)
POTASSIUM SERPL-MCNC: 4.8 MMOL/L — SIGNIFICANT CHANGE UP (ref 3.5–5.3)
POTASSIUM SERPL-MCNC: 5.1 MMOL/L — SIGNIFICANT CHANGE UP (ref 3.5–5.3)
POTASSIUM SERPL-MCNC: 5.5 MMOL/L — HIGH (ref 3.5–5.3)
POTASSIUM SERPL-MCNC: 5.8 MMOL/L — HIGH (ref 3.5–5.3)
POTASSIUM SERPL-SCNC: 3.4 MMOL/L — LOW (ref 3.5–5.3)
POTASSIUM SERPL-SCNC: 3.5 MMOL/L — SIGNIFICANT CHANGE UP (ref 3.5–5.3)
POTASSIUM SERPL-SCNC: 3.6 MMOL/L — SIGNIFICANT CHANGE UP (ref 3.5–5.3)
POTASSIUM SERPL-SCNC: 3.7 MMOL/L — SIGNIFICANT CHANGE UP (ref 3.5–5.3)
POTASSIUM SERPL-SCNC: 3.8 MMOL/L — SIGNIFICANT CHANGE UP (ref 3.5–5.3)
POTASSIUM SERPL-SCNC: 3.9 MMOL/L — SIGNIFICANT CHANGE UP (ref 3.5–5.3)
POTASSIUM SERPL-SCNC: 4 MMOL/L — SIGNIFICANT CHANGE UP (ref 3.5–5.3)
POTASSIUM SERPL-SCNC: 4.1 MMOL/L — SIGNIFICANT CHANGE UP (ref 3.5–5.3)
POTASSIUM SERPL-SCNC: 4.2 MMOL/L — SIGNIFICANT CHANGE UP (ref 3.5–5.3)
POTASSIUM SERPL-SCNC: 4.2 MMOL/L — SIGNIFICANT CHANGE UP (ref 3.5–5.3)
POTASSIUM SERPL-SCNC: 4.3 MMOL/L — SIGNIFICANT CHANGE UP (ref 3.5–5.3)
POTASSIUM SERPL-SCNC: 4.3 MMOL/L — SIGNIFICANT CHANGE UP (ref 3.5–5.3)
POTASSIUM SERPL-SCNC: 4.6 MMOL/L — SIGNIFICANT CHANGE UP (ref 3.5–5.3)
POTASSIUM SERPL-SCNC: 4.8 MMOL/L — SIGNIFICANT CHANGE UP (ref 3.5–5.3)
POTASSIUM SERPL-SCNC: 4.8 MMOL/L — SIGNIFICANT CHANGE UP (ref 3.5–5.3)
POTASSIUM SERPL-SCNC: 5.1 MMOL/L — SIGNIFICANT CHANGE UP (ref 3.5–5.3)
POTASSIUM SERPL-SCNC: 5.5 MMOL/L — HIGH (ref 3.5–5.3)
POTASSIUM SERPL-SCNC: 5.8 MMOL/L — HIGH (ref 3.5–5.3)
PREALB SERPL-MCNC: 12 MG/DL — LOW (ref 20–40)
PROT SERPL-MCNC: 4.3 GM/DL — LOW (ref 6–8.3)
PROT SERPL-MCNC: 4.3 GM/DL — LOW (ref 6–8.3)
PROT SERPL-MCNC: 4.4 GM/DL — LOW (ref 6–8.3)
PROT SERPL-MCNC: 4.5 GM/DL — LOW (ref 6–8.3)
PROT SERPL-MCNC: 4.6 GM/DL — LOW (ref 6–8.3)
PROT SERPL-MCNC: 4.7 GM/DL — LOW (ref 6–8.3)
PROT SERPL-MCNC: 4.9 GM/DL — LOW (ref 6–8.3)
PROT SERPL-MCNC: 4.9 GM/DL — LOW (ref 6–8.3)
PROT SERPL-MCNC: 5.2 GM/DL — LOW (ref 6–8.3)
PROT SERPL-MCNC: 5.4 GM/DL — LOW (ref 6–8.3)
PROT SERPL-MCNC: 5.6 GM/DL — LOW (ref 6–8.3)
PROT SERPL-MCNC: 7.4 GM/DL — SIGNIFICANT CHANGE UP (ref 6–8.3)
PROTHROM AB SERPL-ACNC: 10.9 SEC — SIGNIFICANT CHANGE UP (ref 10.5–13.4)
RBC # BLD: 3.04 M/UL — LOW (ref 4.2–5.8)
RBC # BLD: 3.31 M/UL — LOW (ref 4.2–5.8)
RBC # BLD: 3.54 M/UL — LOW (ref 4.2–5.8)
RBC # BLD: 3.55 M/UL — LOW (ref 4.2–5.8)
RBC # BLD: 3.61 M/UL — LOW (ref 4.2–5.8)
RBC # BLD: 3.8 M/UL — LOW (ref 4.2–5.8)
RBC # BLD: 3.9 M/UL — LOW (ref 4.2–5.8)
RBC # BLD: 3.96 M/UL — LOW (ref 4.2–5.8)
RBC # BLD: 3.96 M/UL — LOW (ref 4.2–5.8)
RBC # BLD: 4.01 M/UL — LOW (ref 4.2–5.8)
RBC # BLD: 4.08 M/UL — LOW (ref 4.2–5.8)
RBC # BLD: 4.15 M/UL — LOW (ref 4.2–5.8)
RBC # BLD: 4.28 M/UL — SIGNIFICANT CHANGE UP (ref 4.2–5.8)
RBC # BLD: 4.3 M/UL — SIGNIFICANT CHANGE UP (ref 4.2–5.8)
RBC # BLD: 4.32 M/UL — SIGNIFICANT CHANGE UP (ref 4.2–5.8)
RBC # BLD: 4.81 M/UL — SIGNIFICANT CHANGE UP (ref 4.2–5.8)
RBC # BLD: 4.85 M/UL — SIGNIFICANT CHANGE UP (ref 4.2–5.8)
RBC # BLD: 5.04 M/UL — SIGNIFICANT CHANGE UP (ref 4.2–5.8)
RBC # FLD: 13.5 % — SIGNIFICANT CHANGE UP (ref 10.3–14.5)
RBC # FLD: 13.6 % — SIGNIFICANT CHANGE UP (ref 10.3–14.5)
RBC # FLD: 13.6 % — SIGNIFICANT CHANGE UP (ref 10.3–14.5)
RBC # FLD: 13.7 % — SIGNIFICANT CHANGE UP (ref 10.3–14.5)
RBC # FLD: 13.8 % — SIGNIFICANT CHANGE UP (ref 10.3–14.5)
RBC # FLD: 13.8 % — SIGNIFICANT CHANGE UP (ref 10.3–14.5)
RBC # FLD: 14 % — SIGNIFICANT CHANGE UP (ref 10.3–14.5)
RBC # FLD: 14.2 % — SIGNIFICANT CHANGE UP (ref 10.3–14.5)
RBC # FLD: 14.2 % — SIGNIFICANT CHANGE UP (ref 10.3–14.5)
RBC # FLD: 14.3 % — SIGNIFICANT CHANGE UP (ref 10.3–14.5)
RBC # FLD: 14.4 % — SIGNIFICANT CHANGE UP (ref 10.3–14.5)
RBC # FLD: 14.6 % — HIGH (ref 10.3–14.5)
SAO2 % BLDA: 97 % — SIGNIFICANT CHANGE UP
SARS-COV-2 RNA SPEC QL NAA+PROBE: SIGNIFICANT CHANGE UP
SODIUM SERPL-SCNC: 136 MMOL/L — SIGNIFICANT CHANGE UP (ref 135–145)
SODIUM SERPL-SCNC: 141 MMOL/L — SIGNIFICANT CHANGE UP (ref 135–145)
SODIUM SERPL-SCNC: 142 MMOL/L — SIGNIFICANT CHANGE UP (ref 135–145)
SODIUM SERPL-SCNC: 142 MMOL/L — SIGNIFICANT CHANGE UP (ref 135–145)
SODIUM SERPL-SCNC: 143 MMOL/L — SIGNIFICANT CHANGE UP (ref 135–145)
SODIUM SERPL-SCNC: 144 MMOL/L — SIGNIFICANT CHANGE UP (ref 135–145)
SODIUM SERPL-SCNC: 145 MMOL/L — SIGNIFICANT CHANGE UP (ref 135–145)
SODIUM SERPL-SCNC: 146 MMOL/L — HIGH (ref 135–145)
SODIUM SERPL-SCNC: 147 MMOL/L — HIGH (ref 135–145)
SODIUM SERPL-SCNC: 147 MMOL/L — HIGH (ref 135–145)
SODIUM SERPL-SCNC: 148 MMOL/L — HIGH (ref 135–145)
SODIUM SERPL-SCNC: 149 MMOL/L — HIGH (ref 135–145)
SODIUM SERPL-SCNC: 150 MMOL/L — HIGH (ref 135–145)
SPECIMEN SOURCE: SIGNIFICANT CHANGE UP
SPECIMEN SOURCE: SIGNIFICANT CHANGE UP
TRIGL SERPL-MCNC: 118 MG/DL — SIGNIFICANT CHANGE UP
TRIGL SERPL-MCNC: 271 MG/DL — HIGH
TRIGL SERPL-MCNC: 279 MG/DL — HIGH
TRIGL SERPL-MCNC: 98 MG/DL — SIGNIFICANT CHANGE UP
WBC # BLD: 11.04 K/UL — HIGH (ref 3.8–10.5)
WBC # BLD: 11.19 K/UL — HIGH (ref 3.8–10.5)
WBC # BLD: 13.13 K/UL — HIGH (ref 3.8–10.5)
WBC # BLD: 14.06 K/UL — HIGH (ref 3.8–10.5)
WBC # BLD: 15.62 K/UL — HIGH (ref 3.8–10.5)
WBC # BLD: 15.74 K/UL — HIGH (ref 3.8–10.5)
WBC # BLD: 19.96 K/UL — HIGH (ref 3.8–10.5)
WBC # BLD: 2.88 K/UL — LOW (ref 3.8–10.5)
WBC # BLD: 23.18 K/UL — HIGH (ref 3.8–10.5)
WBC # BLD: 23.97 K/UL — HIGH (ref 3.8–10.5)
WBC # BLD: 24.56 K/UL — HIGH (ref 3.8–10.5)
WBC # BLD: 26.39 K/UL — HIGH (ref 3.8–10.5)
WBC # BLD: 4.31 K/UL — SIGNIFICANT CHANGE UP (ref 3.8–10.5)
WBC # BLD: 4.55 K/UL — SIGNIFICANT CHANGE UP (ref 3.8–10.5)
WBC # BLD: 5.24 K/UL — SIGNIFICANT CHANGE UP (ref 3.8–10.5)
WBC # BLD: 5.44 K/UL — SIGNIFICANT CHANGE UP (ref 3.8–10.5)
WBC # BLD: 8.49 K/UL — SIGNIFICANT CHANGE UP (ref 3.8–10.5)
WBC # BLD: 9.44 K/UL — SIGNIFICANT CHANGE UP (ref 3.8–10.5)
WBC # FLD AUTO: 11.04 K/UL — HIGH (ref 3.8–10.5)
WBC # FLD AUTO: 11.19 K/UL — HIGH (ref 3.8–10.5)
WBC # FLD AUTO: 13.13 K/UL — HIGH (ref 3.8–10.5)
WBC # FLD AUTO: 14.06 K/UL — HIGH (ref 3.8–10.5)
WBC # FLD AUTO: 15.62 K/UL — HIGH (ref 3.8–10.5)
WBC # FLD AUTO: 15.74 K/UL — HIGH (ref 3.8–10.5)
WBC # FLD AUTO: 19.96 K/UL — HIGH (ref 3.8–10.5)
WBC # FLD AUTO: 2.88 K/UL — LOW (ref 3.8–10.5)
WBC # FLD AUTO: 23.18 K/UL — HIGH (ref 3.8–10.5)
WBC # FLD AUTO: 23.97 K/UL — HIGH (ref 3.8–10.5)
WBC # FLD AUTO: 24.56 K/UL — HIGH (ref 3.8–10.5)
WBC # FLD AUTO: 26.39 K/UL — HIGH (ref 3.8–10.5)
WBC # FLD AUTO: 4.31 K/UL — SIGNIFICANT CHANGE UP (ref 3.8–10.5)
WBC # FLD AUTO: 4.55 K/UL — SIGNIFICANT CHANGE UP (ref 3.8–10.5)
WBC # FLD AUTO: 5.24 K/UL — SIGNIFICANT CHANGE UP (ref 3.8–10.5)
WBC # FLD AUTO: 5.44 K/UL — SIGNIFICANT CHANGE UP (ref 3.8–10.5)
WBC # FLD AUTO: 8.49 K/UL — SIGNIFICANT CHANGE UP (ref 3.8–10.5)
WBC # FLD AUTO: 9.44 K/UL — SIGNIFICANT CHANGE UP (ref 3.8–10.5)

## 2022-01-01 PROCEDURE — 88307 TISSUE EXAM BY PATHOLOGIST: CPT | Mod: 26

## 2022-01-01 PROCEDURE — 93306 TTE W/DOPPLER COMPLETE: CPT | Mod: 26

## 2022-01-01 PROCEDURE — U0003: CPT

## 2022-01-01 PROCEDURE — 93010 ELECTROCARDIOGRAM REPORT: CPT

## 2022-01-01 PROCEDURE — 99292 CRITICAL CARE ADDL 30 MIN: CPT

## 2022-01-01 PROCEDURE — C9113: CPT

## 2022-01-01 PROCEDURE — 99222 1ST HOSP IP/OBS MODERATE 55: CPT

## 2022-01-01 PROCEDURE — 71250 CT THORAX DX C-: CPT | Mod: 26

## 2022-01-01 PROCEDURE — 86077 PHYS BLOOD BANK SERV XMATCH: CPT

## 2022-01-01 PROCEDURE — 93306 TTE W/DOPPLER COMPLETE: CPT

## 2022-01-01 PROCEDURE — 86901 BLOOD TYPING SEROLOGIC RH(D): CPT

## 2022-01-01 PROCEDURE — 71045 X-RAY EXAM CHEST 1 VIEW: CPT | Mod: 26

## 2022-01-01 PROCEDURE — 99232 SBSQ HOSP IP/OBS MODERATE 35: CPT

## 2022-01-01 PROCEDURE — 99285 EMERGENCY DEPT VISIT HI MDM: CPT

## 2022-01-01 PROCEDURE — 71250 CT THORAX DX C-: CPT

## 2022-01-01 PROCEDURE — 93971 EXTREMITY STUDY: CPT | Mod: 26,RT

## 2022-01-01 PROCEDURE — 74177 CT ABD & PELVIS W/CONTRAST: CPT | Mod: 26,MA

## 2022-01-01 PROCEDURE — 84478 ASSAY OF TRIGLYCERIDES: CPT

## 2022-01-01 PROCEDURE — 97116 GAIT TRAINING THERAPY: CPT | Mod: GP

## 2022-01-01 PROCEDURE — 83970 ASSAY OF PARATHORMONE: CPT

## 2022-01-01 PROCEDURE — 99233 SBSQ HOSP IP/OBS HIGH 50: CPT

## 2022-01-01 PROCEDURE — 94660 CPAP INITIATION&MGMT: CPT

## 2022-01-01 PROCEDURE — 82962 GLUCOSE BLOOD TEST: CPT

## 2022-01-01 PROCEDURE — 71045 X-RAY EXAM CHEST 1 VIEW: CPT | Mod: 26,76

## 2022-01-01 PROCEDURE — 74019 RADEX ABDOMEN 2 VIEWS: CPT | Mod: 26

## 2022-01-01 PROCEDURE — 36415 COLL VENOUS BLD VENIPUNCTURE: CPT

## 2022-01-01 PROCEDURE — 83880 ASSAY OF NATRIURETIC PEPTIDE: CPT

## 2022-01-01 PROCEDURE — 74176 CT ABD & PELVIS W/O CONTRAST: CPT | Mod: 26

## 2022-01-01 PROCEDURE — U0005: CPT

## 2022-01-01 PROCEDURE — 85379 FIBRIN DEGRADATION QUANT: CPT

## 2022-01-01 PROCEDURE — 82310 ASSAY OF CALCIUM: CPT

## 2022-01-01 PROCEDURE — 92526 ORAL FUNCTION THERAPY: CPT | Mod: GN

## 2022-01-01 PROCEDURE — 87040 BLOOD CULTURE FOR BACTERIA: CPT

## 2022-01-01 PROCEDURE — 12345: CPT | Mod: NC

## 2022-01-01 PROCEDURE — 80053 COMPREHEN METABOLIC PANEL: CPT

## 2022-01-01 PROCEDURE — 99291 CRITICAL CARE FIRST HOUR: CPT

## 2022-01-01 PROCEDURE — 71045 X-RAY EXAM CHEST 1 VIEW: CPT | Mod: 26,77

## 2022-01-01 PROCEDURE — 74018 RADEX ABDOMEN 1 VIEW: CPT

## 2022-01-01 PROCEDURE — 83735 ASSAY OF MAGNESIUM: CPT

## 2022-01-01 PROCEDURE — 74018 RADEX ABDOMEN 1 VIEW: CPT | Mod: 26

## 2022-01-01 PROCEDURE — 85025 COMPLETE CBC W/AUTO DIFF WBC: CPT

## 2022-01-01 PROCEDURE — 36600 WITHDRAWAL OF ARTERIAL BLOOD: CPT

## 2022-01-01 PROCEDURE — 86870 RBC ANTIBODY IDENTIFICATION: CPT

## 2022-01-01 PROCEDURE — 92610 EVALUATE SWALLOWING FUNCTION: CPT | Mod: GN

## 2022-01-01 PROCEDURE — 74019 RADEX ABDOMEN 2 VIEWS: CPT

## 2022-01-01 PROCEDURE — 88307 TISSUE EXAM BY PATHOLOGIST: CPT

## 2022-01-01 PROCEDURE — 44120 REMOVAL OF SMALL INTESTINE: CPT | Mod: AS

## 2022-01-01 PROCEDURE — 85018 HEMOGLOBIN: CPT

## 2022-01-01 PROCEDURE — 71045 X-RAY EXAM CHEST 1 VIEW: CPT

## 2022-01-01 PROCEDURE — 84100 ASSAY OF PHOSPHORUS: CPT

## 2022-01-01 PROCEDURE — 74176 CT ABD & PELVIS W/O CONTRAST: CPT

## 2022-01-01 PROCEDURE — 86900 BLOOD TYPING SEROLOGIC ABO: CPT

## 2022-01-01 PROCEDURE — 82040 ASSAY OF SERUM ALBUMIN: CPT

## 2022-01-01 PROCEDURE — 93005 ELECTROCARDIOGRAM TRACING: CPT

## 2022-01-01 PROCEDURE — 97530 THERAPEUTIC ACTIVITIES: CPT | Mod: GP

## 2022-01-01 PROCEDURE — 84134 ASSAY OF PREALBUMIN: CPT

## 2022-01-01 PROCEDURE — 97162 PT EVAL MOD COMPLEX 30 MIN: CPT | Mod: GP

## 2022-01-01 PROCEDURE — 82306 VITAMIN D 25 HYDROXY: CPT

## 2022-01-01 PROCEDURE — 83605 ASSAY OF LACTIC ACID: CPT

## 2022-01-01 PROCEDURE — 93971 EXTREMITY STUDY: CPT | Mod: RT

## 2022-01-01 PROCEDURE — 80061 LIPID PANEL: CPT

## 2022-01-01 PROCEDURE — C1889: CPT

## 2022-01-01 PROCEDURE — 85027 COMPLETE CBC AUTOMATED: CPT

## 2022-01-01 PROCEDURE — 82803 BLOOD GASES ANY COMBINATION: CPT

## 2022-01-01 PROCEDURE — 80048 BASIC METABOLIC PNL TOTAL CA: CPT

## 2022-01-01 PROCEDURE — 85014 HEMATOCRIT: CPT

## 2022-01-01 PROCEDURE — 86850 RBC ANTIBODY SCREEN: CPT

## 2022-01-01 PROCEDURE — 43752 NASAL/OROGASTRIC W/TUBE PLMT: CPT

## 2022-01-01 PROCEDURE — 86880 COOMBS TEST DIRECT: CPT

## 2022-01-01 PROCEDURE — P9047: CPT | Mod: JG

## 2022-01-01 RX ORDER — ELECTROLYTE SOLUTION,INJ
1 VIAL (ML) INTRAVENOUS
Refills: 0 | Status: DISCONTINUED | OUTPATIENT
Start: 2022-01-01 | End: 2022-01-01

## 2022-01-01 RX ORDER — MEROPENEM 1 G/30ML
1000 INJECTION INTRAVENOUS EVERY 12 HOURS
Refills: 0 | Status: DISCONTINUED | OUTPATIENT
Start: 2022-01-01 | End: 2022-01-01

## 2022-01-01 RX ORDER — METOPROLOL TARTRATE 50 MG
5 TABLET ORAL EVERY 6 HOURS
Refills: 0 | Status: DISCONTINUED | OUTPATIENT
Start: 2022-01-01 | End: 2022-01-01

## 2022-01-01 RX ORDER — I.V. FAT EMULSION 20 G/100ML
0.92 EMULSION INTRAVENOUS
Qty: 50 | Refills: 0 | Status: DISCONTINUED | OUTPATIENT
Start: 2022-01-01 | End: 2022-01-01

## 2022-01-01 RX ORDER — SODIUM CHLORIDE 9 MG/ML
1000 INJECTION INTRAMUSCULAR; INTRAVENOUS; SUBCUTANEOUS
Refills: 0 | Status: DISCONTINUED | OUTPATIENT
Start: 2022-01-01 | End: 2022-01-01

## 2022-01-01 RX ORDER — PROCHLORPERAZINE MALEATE 5 MG
10 TABLET ORAL EVERY 8 HOURS
Refills: 0 | Status: DISCONTINUED | OUTPATIENT
Start: 2022-01-01 | End: 2022-01-01

## 2022-01-01 RX ORDER — ALBUMIN HUMAN 25 %
100 VIAL (ML) INTRAVENOUS
Refills: 0 | Status: DISCONTINUED | OUTPATIENT
Start: 2022-01-01 | End: 2022-01-01

## 2022-01-01 RX ORDER — SODIUM CHLORIDE 9 MG/ML
1000 INJECTION, SOLUTION INTRAVENOUS
Refills: 0 | Status: DISCONTINUED | OUTPATIENT
Start: 2022-01-01 | End: 2022-01-01

## 2022-01-01 RX ORDER — HEPARIN SODIUM 5000 [USP'U]/ML
5000 INJECTION INTRAVENOUS; SUBCUTANEOUS EVERY 12 HOURS
Refills: 0 | Status: DISCONTINUED | OUTPATIENT
Start: 2022-01-01 | End: 2022-01-01

## 2022-01-01 RX ORDER — PHENYLEPHRINE HYDROCHLORIDE 10 MG/ML
0.5 INJECTION INTRAVENOUS
Qty: 40 | Refills: 0 | Status: DISCONTINUED | OUTPATIENT
Start: 2022-01-01 | End: 2022-01-01

## 2022-01-01 RX ORDER — MEROPENEM 1 G/30ML
1000 INJECTION INTRAVENOUS EVERY 12 HOURS
Refills: 0 | Status: COMPLETED | OUTPATIENT
Start: 2022-01-01 | End: 2022-01-01

## 2022-01-01 RX ORDER — SODIUM CHLORIDE 9 MG/ML
500 INJECTION, SOLUTION INTRAVENOUS ONCE
Refills: 0 | Status: COMPLETED | OUTPATIENT
Start: 2022-01-01 | End: 2022-01-01

## 2022-01-01 RX ORDER — SODIUM CHLORIDE 9 MG/ML
500 INJECTION INTRAMUSCULAR; INTRAVENOUS; SUBCUTANEOUS ONCE
Refills: 0 | Status: COMPLETED | OUTPATIENT
Start: 2022-01-01 | End: 2022-01-01

## 2022-01-01 RX ORDER — LOSARTAN POTASSIUM 100 MG/1
12.5 TABLET, FILM COATED ORAL
Refills: 0 | Status: DISCONTINUED | OUTPATIENT
Start: 2022-01-01 | End: 2022-01-01

## 2022-01-01 RX ORDER — PANTOPRAZOLE SODIUM 20 MG/1
40 TABLET, DELAYED RELEASE ORAL DAILY
Refills: 0 | Status: DISCONTINUED | OUTPATIENT
Start: 2022-01-01 | End: 2022-01-01

## 2022-01-01 RX ORDER — ONDANSETRON 8 MG/1
4 TABLET, FILM COATED ORAL EVERY 8 HOURS
Refills: 0 | Status: DISCONTINUED | OUTPATIENT
Start: 2022-01-01 | End: 2022-01-01

## 2022-01-01 RX ORDER — LOSARTAN POTASSIUM 100 MG/1
12.5 TABLET, FILM COATED ORAL DAILY
Refills: 0 | Status: DISCONTINUED | OUTPATIENT
Start: 2022-01-01 | End: 2022-01-01

## 2022-01-01 RX ORDER — MORPHINE SULFATE 50 MG/1
2 CAPSULE, EXTENDED RELEASE ORAL
Refills: 0 | Status: DISCONTINUED | OUTPATIENT
Start: 2022-01-01 | End: 2022-01-01

## 2022-01-01 RX ORDER — CEFOTETAN DISODIUM 1 G
1 VIAL (EA) INJECTION EVERY 12 HOURS
Refills: 0 | Status: DISCONTINUED | OUTPATIENT
Start: 2022-01-01 | End: 2022-01-01

## 2022-01-01 RX ORDER — PHENYLEPHRINE HYDROCHLORIDE 10 MG/ML
0.5 INJECTION INTRAVENOUS
Qty: 160 | Refills: 0 | Status: DISCONTINUED | OUTPATIENT
Start: 2022-01-01 | End: 2022-01-01

## 2022-01-01 RX ORDER — ALBUMIN HUMAN 25 %
50 VIAL (ML) INTRAVENOUS EVERY 6 HOURS
Refills: 0 | Status: DISCONTINUED | OUTPATIENT
Start: 2022-01-01 | End: 2022-01-01

## 2022-01-01 RX ORDER — CHLORHEXIDINE GLUCONATE 213 G/1000ML
1 SOLUTION TOPICAL
Refills: 0 | Status: DISCONTINUED | OUTPATIENT
Start: 2022-01-01 | End: 2022-01-01

## 2022-01-01 RX ORDER — LOSARTAN POTASSIUM 100 MG/1
1 TABLET, FILM COATED ORAL
Qty: 0 | Refills: 0 | DISCHARGE

## 2022-01-01 RX ORDER — ENOXAPARIN SODIUM 100 MG/ML
30 INJECTION SUBCUTANEOUS EVERY 24 HOURS
Refills: 0 | Status: DISCONTINUED | OUTPATIENT
Start: 2022-01-01 | End: 2022-01-01

## 2022-01-01 RX ORDER — FENTANYL CITRATE 50 UG/ML
50 INJECTION INTRAVENOUS
Refills: 0 | Status: DISCONTINUED | OUTPATIENT
Start: 2022-01-01 | End: 2022-01-01

## 2022-01-01 RX ORDER — ACETAMINOPHEN 500 MG
1000 TABLET ORAL ONCE
Refills: 0 | Status: COMPLETED | OUTPATIENT
Start: 2022-01-01 | End: 2022-01-01

## 2022-01-01 RX ORDER — MORPHINE SULFATE 50 MG/1
4 CAPSULE, EXTENDED RELEASE ORAL EVERY 4 HOURS
Refills: 0 | Status: DISCONTINUED | OUTPATIENT
Start: 2022-01-01 | End: 2022-01-01

## 2022-01-01 RX ORDER — TAMSULOSIN HYDROCHLORIDE 0.4 MG/1
0.4 CAPSULE ORAL AT BEDTIME
Refills: 0 | Status: DISCONTINUED | OUTPATIENT
Start: 2022-01-01 | End: 2022-01-01

## 2022-01-01 RX ORDER — METOPROLOL TARTRATE 50 MG
1 TABLET ORAL
Qty: 0 | Refills: 0 | DISCHARGE

## 2022-01-01 RX ORDER — CEFEPIME 1 G/1
2000 INJECTION, POWDER, FOR SOLUTION INTRAMUSCULAR; INTRAVENOUS ONCE
Refills: 0 | Status: DISCONTINUED | OUTPATIENT
Start: 2022-01-01 | End: 2022-01-01

## 2022-01-01 RX ORDER — CEFEPIME 1 G/1
2000 INJECTION, POWDER, FOR SOLUTION INTRAMUSCULAR; INTRAVENOUS EVERY 12 HOURS
Refills: 0 | Status: DISCONTINUED | OUTPATIENT
Start: 2022-01-01 | End: 2022-01-01

## 2022-01-01 RX ORDER — ALFUZOSIN HYDROCHLORIDE 10 MG/1
1 TABLET, EXTENDED RELEASE ORAL
Qty: 0 | Refills: 0 | DISCHARGE

## 2022-01-01 RX ORDER — AMLODIPINE BESYLATE 2.5 MG/1
5 TABLET ORAL DAILY
Refills: 0 | Status: DISCONTINUED | OUTPATIENT
Start: 2022-01-01 | End: 2022-01-01

## 2022-01-01 RX ORDER — LIDOCAINE HCL 20 MG/ML
5 VIAL (ML) INJECTION ONCE
Refills: 0 | Status: COMPLETED | OUTPATIENT
Start: 2022-01-01 | End: 2022-01-01

## 2022-01-01 RX ORDER — HEPARIN SODIUM 5000 [USP'U]/ML
5000 INJECTION INTRAVENOUS; SUBCUTANEOUS EVERY 8 HOURS
Refills: 0 | Status: DISCONTINUED | OUTPATIENT
Start: 2022-01-01 | End: 2022-01-01

## 2022-01-01 RX ORDER — AZELASTINE 137 UG/1
0 SPRAY, METERED NASAL
Qty: 0 | Refills: 0 | DISCHARGE

## 2022-01-01 RX ORDER — MORPHINE SULFATE 50 MG/1
2 CAPSULE, EXTENDED RELEASE ORAL ONCE
Refills: 0 | Status: DISCONTINUED | OUTPATIENT
Start: 2022-01-01 | End: 2022-01-01

## 2022-01-01 RX ORDER — CEFEPIME 1 G/1
INJECTION, POWDER, FOR SOLUTION INTRAMUSCULAR; INTRAVENOUS
Refills: 0 | Status: DISCONTINUED | OUTPATIENT
Start: 2022-01-01 | End: 2022-01-01

## 2022-01-01 RX ORDER — ACETAMINOPHEN AND IBUPROFEN 250; 125 MG/1; MG/1
2 TABLET ORAL
Qty: 0 | Refills: 0 | DISCHARGE

## 2022-01-01 RX ORDER — FUROSEMIDE 40 MG
20 TABLET ORAL EVERY 6 HOURS
Refills: 0 | Status: DISCONTINUED | OUTPATIENT
Start: 2022-01-01 | End: 2022-01-01

## 2022-01-01 RX ORDER — PANTOPRAZOLE SODIUM 20 MG/1
40 TABLET, DELAYED RELEASE ORAL EVERY 12 HOURS
Refills: 0 | Status: DISCONTINUED | OUTPATIENT
Start: 2022-01-01 | End: 2022-01-01

## 2022-01-01 RX ORDER — METOPROLOL TARTRATE 50 MG
25 TABLET ORAL
Refills: 0 | Status: DISCONTINUED | OUTPATIENT
Start: 2022-01-01 | End: 2022-01-01

## 2022-01-01 RX ORDER — DEXTROSE MONOHYDRATE, SODIUM CHLORIDE, AND POTASSIUM CHLORIDE 50; .745; 4.5 G/1000ML; G/1000ML; G/1000ML
1000 INJECTION, SOLUTION INTRAVENOUS
Refills: 0 | Status: DISCONTINUED | OUTPATIENT
Start: 2022-01-01 | End: 2022-01-01

## 2022-01-01 RX ORDER — ONDANSETRON 8 MG/1
4 TABLET, FILM COATED ORAL ONCE
Refills: 0 | Status: DISCONTINUED | OUTPATIENT
Start: 2022-01-01 | End: 2022-01-01

## 2022-01-01 RX ORDER — ONDANSETRON 8 MG/1
4 TABLET, FILM COATED ORAL ONCE
Refills: 0 | Status: COMPLETED | OUTPATIENT
Start: 2022-01-01 | End: 2022-01-01

## 2022-01-01 RX ORDER — ALFUZOSIN HYDROCHLORIDE 10 MG/1
0 TABLET, EXTENDED RELEASE ORAL
Qty: 0 | Refills: 0 | DISCHARGE

## 2022-01-01 RX ORDER — POTASSIUM PHOSPHATE, MONOBASIC POTASSIUM PHOSPHATE, DIBASIC 236; 224 MG/ML; MG/ML
15 INJECTION, SOLUTION INTRAVENOUS ONCE
Refills: 0 | Status: COMPLETED | OUTPATIENT
Start: 2022-01-01 | End: 2022-01-01

## 2022-01-01 RX ORDER — HYDROMORPHONE HYDROCHLORIDE 2 MG/ML
0.5 INJECTION INTRAMUSCULAR; INTRAVENOUS; SUBCUTANEOUS EVERY 6 HOURS
Refills: 0 | Status: DISCONTINUED | OUTPATIENT
Start: 2022-01-01 | End: 2022-01-01

## 2022-01-01 RX ORDER — FUROSEMIDE 40 MG
40 TABLET ORAL DAILY
Refills: 0 | Status: DISCONTINUED | OUTPATIENT
Start: 2022-01-01 | End: 2022-01-01

## 2022-01-01 RX ORDER — SODIUM CHLORIDE 9 MG/ML
10 INJECTION INTRAMUSCULAR; INTRAVENOUS; SUBCUTANEOUS
Refills: 0 | Status: DISCONTINUED | OUTPATIENT
Start: 2022-01-01 | End: 2022-01-01

## 2022-01-01 RX ORDER — AMLODIPINE BESYLATE 2.5 MG/1
1 TABLET ORAL
Qty: 0 | Refills: 0 | DISCHARGE

## 2022-01-01 RX ORDER — FUROSEMIDE 40 MG
40 TABLET ORAL ONCE
Refills: 0 | Status: COMPLETED | OUTPATIENT
Start: 2022-01-01 | End: 2022-01-01

## 2022-01-01 RX ORDER — FUROSEMIDE 40 MG
20 TABLET ORAL ONCE
Refills: 0 | Status: COMPLETED | OUTPATIENT
Start: 2022-01-01 | End: 2022-01-01

## 2022-01-01 RX ORDER — HYDROMORPHONE HYDROCHLORIDE 2 MG/ML
0.5 INJECTION INTRAMUSCULAR; INTRAVENOUS; SUBCUTANEOUS ONCE
Refills: 0 | Status: DISCONTINUED | OUTPATIENT
Start: 2022-01-01 | End: 2022-01-01

## 2022-01-01 RX ORDER — FUROSEMIDE 40 MG
20 TABLET ORAL DAILY
Refills: 0 | Status: DISCONTINUED | OUTPATIENT
Start: 2022-01-01 | End: 2022-01-01

## 2022-01-01 RX ADMIN — Medication 40 MILLIGRAM(S): at 08:11

## 2022-01-01 RX ADMIN — Medication 1 EACH: at 23:13

## 2022-01-01 RX ADMIN — ENOXAPARIN SODIUM 30 MILLIGRAM(S): 100 INJECTION SUBCUTANEOUS at 17:38

## 2022-01-01 RX ADMIN — Medication 20 MILLIGRAM(S): at 13:00

## 2022-01-01 RX ADMIN — HEPARIN SODIUM 5000 UNIT(S): 5000 INJECTION INTRAVENOUS; SUBCUTANEOUS at 09:22

## 2022-01-01 RX ADMIN — Medication 5 MILLIGRAM(S): at 23:14

## 2022-01-01 RX ADMIN — HEPARIN SODIUM 5000 UNIT(S): 5000 INJECTION INTRAVENOUS; SUBCUTANEOUS at 23:14

## 2022-01-01 RX ADMIN — DEXTROSE MONOHYDRATE, SODIUM CHLORIDE, AND POTASSIUM CHLORIDE 75 MILLILITER(S): 50; .745; 4.5 INJECTION, SOLUTION INTRAVENOUS at 11:09

## 2022-01-01 RX ADMIN — ENOXAPARIN SODIUM 30 MILLIGRAM(S): 100 INJECTION SUBCUTANEOUS at 18:28

## 2022-01-01 RX ADMIN — Medication 5 MILLIGRAM(S): at 17:38

## 2022-01-01 RX ADMIN — HYDROMORPHONE HYDROCHLORIDE 0.5 MILLIGRAM(S): 2 INJECTION INTRAMUSCULAR; INTRAVENOUS; SUBCUTANEOUS at 22:12

## 2022-01-01 RX ADMIN — Medication 50 MILLILITER(S): at 05:57

## 2022-01-01 RX ADMIN — FENTANYL CITRATE 50 MICROGRAM(S): 50 INJECTION INTRAVENOUS at 21:00

## 2022-01-01 RX ADMIN — Medication 1 EACH: at 22:37

## 2022-01-01 RX ADMIN — HYDROMORPHONE HYDROCHLORIDE 0.5 MILLIGRAM(S): 2 INJECTION INTRAMUSCULAR; INTRAVENOUS; SUBCUTANEOUS at 18:53

## 2022-01-01 RX ADMIN — CHLORHEXIDINE GLUCONATE 1 APPLICATION(S): 213 SOLUTION TOPICAL at 07:21

## 2022-01-01 RX ADMIN — SODIUM CHLORIDE 50 MILLILITER(S): 9 INJECTION, SOLUTION INTRAVENOUS at 14:27

## 2022-01-01 RX ADMIN — SODIUM CHLORIDE 80 MILLILITER(S): 9 INJECTION, SOLUTION INTRAVENOUS at 15:54

## 2022-01-01 RX ADMIN — Medication 1 EACH: at 22:40

## 2022-01-01 RX ADMIN — Medication 5 MILLIGRAM(S): at 05:26

## 2022-01-01 RX ADMIN — DEXTROSE MONOHYDRATE, SODIUM CHLORIDE, AND POTASSIUM CHLORIDE 75 MILLILITER(S): 50; .745; 4.5 INJECTION, SOLUTION INTRAVENOUS at 22:11

## 2022-01-01 RX ADMIN — Medication 40 MILLIGRAM(S): at 09:40

## 2022-01-01 RX ADMIN — Medication 5 MILLIGRAM(S): at 13:00

## 2022-01-01 RX ADMIN — Medication 1 EACH: at 22:52

## 2022-01-01 RX ADMIN — Medication 5 MILLIGRAM(S): at 00:38

## 2022-01-01 RX ADMIN — HYDROMORPHONE HYDROCHLORIDE 0.5 MILLIGRAM(S): 2 INJECTION INTRAMUSCULAR; INTRAVENOUS; SUBCUTANEOUS at 04:19

## 2022-01-01 RX ADMIN — I.V. FAT EMULSION 20.83 GM/KG/DAY: 20 EMULSION INTRAVENOUS at 22:14

## 2022-01-01 RX ADMIN — HEPARIN SODIUM 5000 UNIT(S): 5000 INJECTION INTRAVENOUS; SUBCUTANEOUS at 21:10

## 2022-01-01 RX ADMIN — Medication 100 GRAM(S): at 13:55

## 2022-01-01 RX ADMIN — HYDROMORPHONE HYDROCHLORIDE 0.5 MILLIGRAM(S): 2 INJECTION INTRAMUSCULAR; INTRAVENOUS; SUBCUTANEOUS at 18:15

## 2022-01-01 RX ADMIN — HEPARIN SODIUM 5000 UNIT(S): 5000 INJECTION INTRAVENOUS; SUBCUTANEOUS at 22:30

## 2022-01-01 RX ADMIN — Medication 100 GRAM(S): at 13:01

## 2022-01-01 RX ADMIN — HYDROMORPHONE HYDROCHLORIDE 0.5 MILLIGRAM(S): 2 INJECTION INTRAMUSCULAR; INTRAVENOUS; SUBCUTANEOUS at 23:40

## 2022-01-01 RX ADMIN — MEROPENEM 100 MILLIGRAM(S): 1 INJECTION INTRAVENOUS at 09:22

## 2022-01-01 RX ADMIN — Medication 1 EACH: at 23:07

## 2022-01-01 RX ADMIN — PANTOPRAZOLE SODIUM 40 MILLIGRAM(S): 20 TABLET, DELAYED RELEASE ORAL at 09:48

## 2022-01-01 RX ADMIN — MORPHINE SULFATE 2 MILLIGRAM(S): 50 CAPSULE, EXTENDED RELEASE ORAL at 09:48

## 2022-01-01 RX ADMIN — Medication 25 MILLIGRAM(S): at 13:03

## 2022-01-01 RX ADMIN — Medication 1000 MILLIGRAM(S): at 14:49

## 2022-01-01 RX ADMIN — Medication 5 MILLIGRAM(S): at 23:58

## 2022-01-01 RX ADMIN — ONDANSETRON 4 MILLIGRAM(S): 8 TABLET, FILM COATED ORAL at 15:43

## 2022-01-01 RX ADMIN — Medication 1 EACH: at 22:04

## 2022-01-01 RX ADMIN — Medication 1 APPLICATION(S): at 16:50

## 2022-01-01 RX ADMIN — ONDANSETRON 4 MILLIGRAM(S): 8 TABLET, FILM COATED ORAL at 07:45

## 2022-01-01 RX ADMIN — CHLORHEXIDINE GLUCONATE 1 APPLICATION(S): 213 SOLUTION TOPICAL at 05:44

## 2022-01-01 RX ADMIN — Medication 100 GRAM(S): at 01:17

## 2022-01-01 RX ADMIN — Medication 1 EACH: at 22:11

## 2022-01-01 RX ADMIN — I.V. FAT EMULSION 20.83 GM/KG/DAY: 20 EMULSION INTRAVENOUS at 22:53

## 2022-01-01 RX ADMIN — Medication 5 MILLIGRAM(S): at 18:12

## 2022-01-01 RX ADMIN — Medication 25 MILLIGRAM(S): at 09:46

## 2022-01-01 RX ADMIN — CHLORHEXIDINE GLUCONATE 1 APPLICATION(S): 213 SOLUTION TOPICAL at 06:18

## 2022-01-01 RX ADMIN — MEROPENEM 100 MILLIGRAM(S): 1 INJECTION INTRAVENOUS at 22:05

## 2022-01-01 RX ADMIN — HYDROMORPHONE HYDROCHLORIDE 0.5 MILLIGRAM(S): 2 INJECTION INTRAMUSCULAR; INTRAVENOUS; SUBCUTANEOUS at 08:15

## 2022-01-01 RX ADMIN — CHLORHEXIDINE GLUCONATE 1 APPLICATION(S): 213 SOLUTION TOPICAL at 05:43

## 2022-01-01 RX ADMIN — HYDROMORPHONE HYDROCHLORIDE 0.5 MILLIGRAM(S): 2 INJECTION INTRAMUSCULAR; INTRAVENOUS; SUBCUTANEOUS at 13:30

## 2022-01-01 RX ADMIN — HEPARIN SODIUM 5000 UNIT(S): 5000 INJECTION INTRAVENOUS; SUBCUTANEOUS at 09:48

## 2022-01-01 RX ADMIN — HEPARIN SODIUM 5000 UNIT(S): 5000 INJECTION INTRAVENOUS; SUBCUTANEOUS at 09:41

## 2022-01-01 RX ADMIN — HYDROMORPHONE HYDROCHLORIDE 0.5 MILLIGRAM(S): 2 INJECTION INTRAMUSCULAR; INTRAVENOUS; SUBCUTANEOUS at 19:00

## 2022-01-01 RX ADMIN — Medication 400 MILLIGRAM(S): at 16:37

## 2022-01-01 RX ADMIN — Medication 50 MILLILITER(S): at 09:33

## 2022-01-01 RX ADMIN — FENTANYL CITRATE 50 MICROGRAM(S): 50 INJECTION INTRAVENOUS at 20:20

## 2022-01-01 RX ADMIN — CHLORHEXIDINE GLUCONATE 1 APPLICATION(S): 213 SOLUTION TOPICAL at 09:21

## 2022-01-01 RX ADMIN — MORPHINE SULFATE 4 MILLIGRAM(S): 50 CAPSULE, EXTENDED RELEASE ORAL at 07:17

## 2022-01-01 RX ADMIN — CHLORHEXIDINE GLUCONATE 1 APPLICATION(S): 213 SOLUTION TOPICAL at 05:18

## 2022-01-01 RX ADMIN — ENOXAPARIN SODIUM 30 MILLIGRAM(S): 100 INJECTION SUBCUTANEOUS at 17:59

## 2022-01-01 RX ADMIN — Medication 40 MILLIGRAM(S): at 09:41

## 2022-01-01 RX ADMIN — HYDROMORPHONE HYDROCHLORIDE 0.5 MILLIGRAM(S): 2 INJECTION INTRAMUSCULAR; INTRAVENOUS; SUBCUTANEOUS at 12:02

## 2022-01-01 RX ADMIN — MEROPENEM 100 MILLIGRAM(S): 1 INJECTION INTRAVENOUS at 21:16

## 2022-01-01 RX ADMIN — SODIUM CHLORIDE 500 MILLILITER(S): 9 INJECTION INTRAMUSCULAR; INTRAVENOUS; SUBCUTANEOUS at 11:42

## 2022-01-01 RX ADMIN — I.V. FAT EMULSION 20.83 GM/KG/DAY: 20 EMULSION INTRAVENOUS at 23:12

## 2022-01-01 RX ADMIN — Medication 1 APPLICATION(S): at 13:20

## 2022-01-01 RX ADMIN — Medication 400 MILLIGRAM(S): at 22:00

## 2022-01-01 RX ADMIN — HYDROMORPHONE HYDROCHLORIDE 0.5 MILLIGRAM(S): 2 INJECTION INTRAMUSCULAR; INTRAVENOUS; SUBCUTANEOUS at 12:40

## 2022-01-01 RX ADMIN — I.V. FAT EMULSION 20.83 GM/KG/DAY: 20 EMULSION INTRAVENOUS at 22:56

## 2022-01-01 RX ADMIN — SODIUM CHLORIDE 80 MILLILITER(S): 9 INJECTION, SOLUTION INTRAVENOUS at 05:39

## 2022-01-01 RX ADMIN — PHENYLEPHRINE HYDROCHLORIDE 10.2 MICROGRAM(S)/KG/MIN: 10 INJECTION INTRAVENOUS at 03:26

## 2022-01-01 RX ADMIN — Medication 1 APPLICATION(S): at 17:16

## 2022-01-01 RX ADMIN — Medication 20 MILLIGRAM(S): at 09:41

## 2022-01-01 RX ADMIN — Medication 1 APPLICATION(S): at 09:38

## 2022-01-01 RX ADMIN — MEROPENEM 100 MILLIGRAM(S): 1 INJECTION INTRAVENOUS at 22:06

## 2022-01-01 RX ADMIN — Medication 1 APPLICATION(S): at 12:37

## 2022-01-01 RX ADMIN — CHLORHEXIDINE GLUCONATE 1 APPLICATION(S): 213 SOLUTION TOPICAL at 10:41

## 2022-01-01 RX ADMIN — Medication 1 EACH: at 22:01

## 2022-01-01 RX ADMIN — HYDROMORPHONE HYDROCHLORIDE 0.5 MILLIGRAM(S): 2 INJECTION INTRAMUSCULAR; INTRAVENOUS; SUBCUTANEOUS at 06:31

## 2022-01-01 RX ADMIN — HYDROMORPHONE HYDROCHLORIDE 0.5 MILLIGRAM(S): 2 INJECTION INTRAMUSCULAR; INTRAVENOUS; SUBCUTANEOUS at 18:28

## 2022-01-01 RX ADMIN — Medication 100 GRAM(S): at 23:34

## 2022-01-01 RX ADMIN — HEPARIN SODIUM 5000 UNIT(S): 5000 INJECTION INTRAVENOUS; SUBCUTANEOUS at 22:55

## 2022-01-01 RX ADMIN — PANTOPRAZOLE SODIUM 40 MILLIGRAM(S): 20 TABLET, DELAYED RELEASE ORAL at 09:41

## 2022-01-01 RX ADMIN — MEROPENEM 100 MILLIGRAM(S): 1 INJECTION INTRAVENOUS at 21:46

## 2022-01-01 RX ADMIN — MEROPENEM 100 MILLIGRAM(S): 1 INJECTION INTRAVENOUS at 15:49

## 2022-01-01 RX ADMIN — Medication 5 MILLIGRAM(S): at 00:07

## 2022-01-01 RX ADMIN — Medication 100 GRAM(S): at 11:26

## 2022-01-01 RX ADMIN — MEROPENEM 100 MILLIGRAM(S): 1 INJECTION INTRAVENOUS at 22:30

## 2022-01-01 RX ADMIN — CHLORHEXIDINE GLUCONATE 1 APPLICATION(S): 213 SOLUTION TOPICAL at 06:21

## 2022-01-01 RX ADMIN — MEROPENEM 100 MILLIGRAM(S): 1 INJECTION INTRAVENOUS at 09:47

## 2022-01-01 RX ADMIN — Medication 40 MILLIGRAM(S): at 21:46

## 2022-01-01 RX ADMIN — SODIUM CHLORIDE 50 MILLILITER(S): 9 INJECTION, SOLUTION INTRAVENOUS at 23:00

## 2022-01-01 RX ADMIN — FENTANYL CITRATE 50 MICROGRAM(S): 50 INJECTION INTRAVENOUS at 20:00

## 2022-01-01 RX ADMIN — CHLORHEXIDINE GLUCONATE 1 APPLICATION(S): 213 SOLUTION TOPICAL at 05:45

## 2022-01-01 RX ADMIN — ENOXAPARIN SODIUM 30 MILLIGRAM(S): 100 INJECTION SUBCUTANEOUS at 16:31

## 2022-01-01 RX ADMIN — Medication 5 MILLIGRAM(S): at 19:16

## 2022-01-01 RX ADMIN — SODIUM CHLORIDE 500 MILLILITER(S): 9 INJECTION, SOLUTION INTRAVENOUS at 08:17

## 2022-01-01 RX ADMIN — PANTOPRAZOLE SODIUM 40 MILLIGRAM(S): 20 TABLET, DELAYED RELEASE ORAL at 22:33

## 2022-01-01 RX ADMIN — ONDANSETRON 4 MILLIGRAM(S): 8 TABLET, FILM COATED ORAL at 18:17

## 2022-01-01 RX ADMIN — Medication 40 MILLIGRAM(S): at 22:04

## 2022-01-01 RX ADMIN — I.V. FAT EMULSION 20.83 GM/KG/DAY: 20 EMULSION INTRAVENOUS at 22:04

## 2022-01-01 RX ADMIN — Medication 40 MILLIGRAM(S): at 11:45

## 2022-01-01 RX ADMIN — ENOXAPARIN SODIUM 30 MILLIGRAM(S): 100 INJECTION SUBCUTANEOUS at 17:05

## 2022-01-01 RX ADMIN — SODIUM CHLORIDE 80 MILLILITER(S): 9 INJECTION INTRAMUSCULAR; INTRAVENOUS; SUBCUTANEOUS at 10:27

## 2022-01-01 RX ADMIN — MORPHINE SULFATE 4 MILLIGRAM(S): 50 CAPSULE, EXTENDED RELEASE ORAL at 11:36

## 2022-01-01 RX ADMIN — SODIUM CHLORIDE 1000 MILLILITER(S): 9 INJECTION INTRAMUSCULAR; INTRAVENOUS; SUBCUTANEOUS at 11:00

## 2022-01-01 RX ADMIN — CHLORHEXIDINE GLUCONATE 1 APPLICATION(S): 213 SOLUTION TOPICAL at 05:13

## 2022-01-01 RX ADMIN — Medication 1 EACH: at 23:09

## 2022-01-01 RX ADMIN — I.V. FAT EMULSION 20.83 GM/KG/DAY: 20 EMULSION INTRAVENOUS at 23:06

## 2022-01-01 RX ADMIN — ONDANSETRON 4 MILLIGRAM(S): 8 TABLET, FILM COATED ORAL at 09:48

## 2022-01-01 RX ADMIN — SODIUM CHLORIDE 125 MILLILITER(S): 9 INJECTION INTRAMUSCULAR; INTRAVENOUS; SUBCUTANEOUS at 10:03

## 2022-01-01 RX ADMIN — Medication 100 GRAM(S): at 12:13

## 2022-01-01 RX ADMIN — I.V. FAT EMULSION 20.9 GM/KG/DAY: 20 EMULSION INTRAVENOUS at 23:20

## 2022-01-01 RX ADMIN — HEPARIN SODIUM 5000 UNIT(S): 5000 INJECTION INTRAVENOUS; SUBCUTANEOUS at 09:40

## 2022-01-01 RX ADMIN — HEPARIN SODIUM 5000 UNIT(S): 5000 INJECTION INTRAVENOUS; SUBCUTANEOUS at 22:06

## 2022-01-01 RX ADMIN — ONDANSETRON 4 MILLIGRAM(S): 8 TABLET, FILM COATED ORAL at 03:53

## 2022-01-01 RX ADMIN — Medication 40 MILLIGRAM(S): at 22:06

## 2022-01-01 RX ADMIN — MEROPENEM 100 MILLIGRAM(S): 1 INJECTION INTRAVENOUS at 09:40

## 2022-01-01 RX ADMIN — SODIUM CHLORIDE 100 MILLILITER(S): 9 INJECTION, SOLUTION INTRAVENOUS at 22:35

## 2022-01-01 RX ADMIN — HYDROMORPHONE HYDROCHLORIDE 0.5 MILLIGRAM(S): 2 INJECTION INTRAMUSCULAR; INTRAVENOUS; SUBCUTANEOUS at 02:26

## 2022-01-01 RX ADMIN — Medication 5 MILLIGRAM(S): at 06:45

## 2022-01-01 RX ADMIN — MEROPENEM 100 MILLIGRAM(S): 1 INJECTION INTRAVENOUS at 23:11

## 2022-01-01 RX ADMIN — SODIUM CHLORIDE 1000 MILLILITER(S): 9 INJECTION INTRAMUSCULAR; INTRAVENOUS; SUBCUTANEOUS at 14:33

## 2022-01-01 RX ADMIN — HEPARIN SODIUM 5000 UNIT(S): 5000 INJECTION INTRAVENOUS; SUBCUTANEOUS at 11:45

## 2022-01-01 RX ADMIN — TAMSULOSIN HYDROCHLORIDE 0.4 MILLIGRAM(S): 0.4 CAPSULE ORAL at 23:08

## 2022-01-01 RX ADMIN — SODIUM CHLORIDE 80 MILLILITER(S): 9 INJECTION INTRAMUSCULAR; INTRAVENOUS; SUBCUTANEOUS at 18:10

## 2022-01-01 RX ADMIN — PANTOPRAZOLE SODIUM 40 MILLIGRAM(S): 20 TABLET, DELAYED RELEASE ORAL at 09:39

## 2022-01-01 RX ADMIN — Medication 100 GRAM(S): at 13:52

## 2022-01-01 RX ADMIN — HYDROMORPHONE HYDROCHLORIDE 0.5 MILLIGRAM(S): 2 INJECTION INTRAMUSCULAR; INTRAVENOUS; SUBCUTANEOUS at 12:10

## 2022-01-01 RX ADMIN — I.V. FAT EMULSION 20.83 GM/KG/DAY: 20 EMULSION INTRAVENOUS at 23:16

## 2022-01-01 RX ADMIN — MEROPENEM 100 MILLIGRAM(S): 1 INJECTION INTRAVENOUS at 11:32

## 2022-01-01 RX ADMIN — Medication 400 MILLIGRAM(S): at 14:07

## 2022-01-01 RX ADMIN — Medication 5 MILLIGRAM(S): at 17:24

## 2022-01-01 RX ADMIN — Medication 1 EACH: at 23:10

## 2022-01-01 RX ADMIN — Medication 5 MILLIGRAM(S): at 06:05

## 2022-01-01 RX ADMIN — HYDROMORPHONE HYDROCHLORIDE 0.5 MILLIGRAM(S): 2 INJECTION INTRAMUSCULAR; INTRAVENOUS; SUBCUTANEOUS at 07:45

## 2022-01-01 RX ADMIN — Medication 25 MILLIGRAM(S): at 23:09

## 2022-01-01 RX ADMIN — Medication 40 MILLIGRAM(S): at 22:31

## 2022-01-01 RX ADMIN — CHLORHEXIDINE GLUCONATE 1 APPLICATION(S): 213 SOLUTION TOPICAL at 09:32

## 2022-01-01 RX ADMIN — CHLORHEXIDINE GLUCONATE 1 APPLICATION(S): 213 SOLUTION TOPICAL at 09:42

## 2022-01-01 RX ADMIN — MEROPENEM 100 MILLIGRAM(S): 1 INJECTION INTRAVENOUS at 11:45

## 2022-01-01 RX ADMIN — Medication 100 GRAM(S): at 00:03

## 2022-01-01 RX ADMIN — ENOXAPARIN SODIUM 30 MILLIGRAM(S): 100 INJECTION SUBCUTANEOUS at 18:57

## 2022-01-01 RX ADMIN — ENOXAPARIN SODIUM 30 MILLIGRAM(S): 100 INJECTION SUBCUTANEOUS at 17:35

## 2022-01-01 RX ADMIN — Medication 1 EACH: at 22:56

## 2022-01-01 RX ADMIN — HYDROMORPHONE HYDROCHLORIDE 0.5 MILLIGRAM(S): 2 INJECTION INTRAMUSCULAR; INTRAVENOUS; SUBCUTANEOUS at 22:39

## 2022-01-01 RX ADMIN — I.V. FAT EMULSION 20.83 GM/KG/DAY: 20 EMULSION INTRAVENOUS at 22:01

## 2022-01-01 RX ADMIN — PHENYLEPHRINE HYDROCHLORIDE 10.2 MICROGRAM(S)/KG/MIN: 10 INJECTION INTRAVENOUS at 17:31

## 2022-01-01 RX ADMIN — MEROPENEM 100 MILLIGRAM(S): 1 INJECTION INTRAVENOUS at 21:11

## 2022-01-01 RX ADMIN — I.V. FAT EMULSION 20.9 GM/KG/DAY: 20 EMULSION INTRAVENOUS at 22:39

## 2022-01-01 RX ADMIN — Medication 5 MILLIGRAM(S): at 05:18

## 2022-01-01 RX ADMIN — Medication 40 MILLIGRAM(S): at 23:16

## 2022-01-01 RX ADMIN — HYDROMORPHONE HYDROCHLORIDE 0.5 MILLIGRAM(S): 2 INJECTION INTRAMUSCULAR; INTRAVENOUS; SUBCUTANEOUS at 08:07

## 2022-01-01 RX ADMIN — CHLORHEXIDINE GLUCONATE 1 APPLICATION(S): 213 SOLUTION TOPICAL at 09:37

## 2022-01-01 RX ADMIN — Medication 5 MILLIGRAM(S): at 12:11

## 2022-01-01 RX ADMIN — HEPARIN SODIUM 5000 UNIT(S): 5000 INJECTION INTRAVENOUS; SUBCUTANEOUS at 09:16

## 2022-01-01 RX ADMIN — Medication 1 APPLICATION(S): at 14:00

## 2022-01-01 RX ADMIN — Medication 100 GRAM(S): at 00:08

## 2022-01-01 RX ADMIN — CHLORHEXIDINE GLUCONATE 1 APPLICATION(S): 213 SOLUTION TOPICAL at 05:58

## 2022-01-01 RX ADMIN — Medication 40 MILLIGRAM(S): at 05:27

## 2022-01-01 RX ADMIN — HYDROMORPHONE HYDROCHLORIDE 0.5 MILLIGRAM(S): 2 INJECTION INTRAMUSCULAR; INTRAVENOUS; SUBCUTANEOUS at 15:16

## 2022-01-01 RX ADMIN — Medication 100 GRAM(S): at 23:57

## 2022-01-01 RX ADMIN — ONDANSETRON 4 MILLIGRAM(S): 8 TABLET, FILM COATED ORAL at 15:15

## 2022-01-01 RX ADMIN — PANTOPRAZOLE SODIUM 40 MILLIGRAM(S): 20 TABLET, DELAYED RELEASE ORAL at 11:44

## 2022-01-01 RX ADMIN — Medication 5 MILLIGRAM(S): at 05:48

## 2022-01-01 RX ADMIN — Medication 1 APPLICATION(S): at 13:03

## 2022-01-01 RX ADMIN — Medication 100 GRAM(S): at 00:16

## 2022-01-01 RX ADMIN — Medication 1 APPLICATION(S): at 13:59

## 2022-01-01 RX ADMIN — CHLORHEXIDINE GLUCONATE 1 APPLICATION(S): 213 SOLUTION TOPICAL at 05:23

## 2022-01-01 RX ADMIN — PHENYLEPHRINE HYDROCHLORIDE 10.2 MICROGRAM(S)/KG/MIN: 10 INJECTION INTRAVENOUS at 09:47

## 2022-01-01 RX ADMIN — POTASSIUM PHOSPHATE, MONOBASIC POTASSIUM PHOSPHATE, DIBASIC 62.5 MILLIMOLE(S): 236; 224 INJECTION, SOLUTION INTRAVENOUS at 11:09

## 2022-01-01 RX ADMIN — Medication 50 MILLILITER(S): at 23:16

## 2022-01-01 RX ADMIN — HYDROMORPHONE HYDROCHLORIDE 0.5 MILLIGRAM(S): 2 INJECTION INTRAMUSCULAR; INTRAVENOUS; SUBCUTANEOUS at 03:30

## 2022-01-01 RX ADMIN — SODIUM CHLORIDE 500 MILLILITER(S): 9 INJECTION INTRAMUSCULAR; INTRAVENOUS; SUBCUTANEOUS at 17:56

## 2022-01-01 RX ADMIN — ENOXAPARIN SODIUM 30 MILLIGRAM(S): 100 INJECTION SUBCUTANEOUS at 16:38

## 2022-01-01 RX ADMIN — Medication 5 MILLIGRAM(S): at 17:43

## 2022-01-01 RX ADMIN — Medication 5 MILLIGRAM(S): at 22:32

## 2022-01-01 RX ADMIN — SODIUM CHLORIDE 50 MILLILITER(S): 9 INJECTION, SOLUTION INTRAVENOUS at 08:54

## 2022-01-01 RX ADMIN — HEPARIN SODIUM 5000 UNIT(S): 5000 INJECTION INTRAVENOUS; SUBCUTANEOUS at 21:45

## 2022-01-01 RX ADMIN — MEROPENEM 100 MILLIGRAM(S): 1 INJECTION INTRAVENOUS at 09:16

## 2022-02-23 NOTE — ASU PATIENT PROFILE, ADULT - PMH
Assessment/Plan:       Diagnoses and all orders for this visit:    Indigestion  -     famotidine (PEPCID) 20 mg tablet; Take 1 tablet (20 mg total) by mouth 2 (two) times a day    Generalized abdominal pain  -     Comprehensive metabolic panel; Future  -     CBC and differential; Future  -     C-reactive protein; Future  -     Lipase; Future  -     US abdomen complete with doppler; Future  -     POCT urine HCG- negative     Eczema, unspecified type  -     mometasone (ELOCON) 0 1 % ointment; Apply topically daily          Symptoms consistent with indigestion possibly related to GERD  Patient advised to avoid spicy, acidic and fatty foods  Also advised to avoid late night eating  Take Pepcid 20mg BID  Cannot rule out biliary colic given intermittent nature of abdominal pain  Lower suspicion for appendicitis, cholecystitis, pancreatitis and diverticulitis- but will check complete abdominal US and labs as listed above  Patient informed if abdominal pain should become severe to seek immediate medical attention  Return in about 2 weeks (around 3/9/2022) for Recheck adbominal pain  The patient indicates understanding of these issues and agrees with the plan  Subjective:      Patient ID: Isha Barlow is a 39 y o  female  HPI   Patient reports abdominal pain for the past 3 weeks - diffuse  Pain is intermittent and is rated moderately  Feels abdomen is bloated and sore  She reports intermittent nausea, but denies vomiting  Denies any diarrhea or constipation  Patient reports increased flatulence  Has tried gingerale which helps with gas and used Ibuprofen once which improved abdominal pain  She denies dysphagia, loss of appetite, early satiety and unintentional weight loss  Patient states her LMP was February 12th which was shorter than normal by 2 days (lasted 3 days) and reports the 1st day of period was more painful than normal  She denies heart burn   She states there is no relationship to food and her abdominal pain  She denies a family history of IBD or other GI issues  She denies regular use of NSAIDS  Does not smoke or drink alcohol  The following portions of the patient's history were reviewed and updated as appropriate: allergies, current medications, past family history, past medical history, past social history, past surgical history and problem list     Review of Systems   Constitutional: Negative for appetite change, chills, fever and unexpected weight change  HENT: Negative for trouble swallowing  Eyes: Negative for visual disturbance  Respiratory: Negative for cough, chest tightness and shortness of breath  Cardiovascular: Negative for chest pain, palpitations and leg swelling  Gastrointestinal: Positive for abdominal pain and nausea  Negative for abdominal distention, blood in stool, constipation, diarrhea and vomiting  Genitourinary: Negative for difficulty urinating, dysuria and hematuria  Skin: Positive for color change and rash  Neurological: Negative for dizziness, syncope, weakness, light-headedness, numbness and headaches  Psychiatric/Behavioral: Negative for sleep disturbance  Objective:  /76   Pulse 90   Temp 97 8 °F (36 6 °C)   Resp 18   Ht 5' 4" (1 626 m)   Wt 55 3 kg (122 lb)   SpO2 98%   BMI 20 94 kg/m²          Physical Exam  Vitals reviewed  Constitutional:       General: She is not in acute distress  Appearance: Normal appearance  She is not ill-appearing, toxic-appearing or diaphoretic  HENT:      Mouth/Throat:      Mouth: Mucous membranes are moist       Pharynx: Oropharynx is clear  No oropharyngeal exudate or posterior oropharyngeal erythema  Eyes:      General: No scleral icterus  Right eye: No discharge  Left eye: No discharge  Extraocular Movements: Extraocular movements intact  Conjunctiva/sclera: Conjunctivae normal    Cardiovascular:      Rate and Rhythm: Normal rate and regular rhythm  Pulses: Normal pulses  Heart sounds: Normal heart sounds  Pulmonary:      Effort: Pulmonary effort is normal       Breath sounds: Normal breath sounds  Abdominal:      General: Abdomen is flat  Bowel sounds are normal  There is no distension  Palpations: Abdomen is soft  Tenderness: There is no abdominal tenderness  There is no right CVA tenderness, left CVA tenderness, guarding or rebound  Musculoskeletal:      Cervical back: Normal range of motion  Skin:     General: Skin is warm and dry  Findings: Erythema and rash (  eczema rash on dorsum of right hand  ) present  Neurological:      General: No focal deficit present  Mental Status: She is alert and oriented to person, place, and time     Psychiatric:         Mood and Affect: Mood normal          Behavior: Behavior normal  BPH (benign prostatic hyperplasia)    Colon cancer  h/o chemo 2004  Heart block    HLD (hyperlipidemia)    HTN (hypertension)    Pacemaker    Ventral hernia

## 2022-05-01 NOTE — H&P ADULT - NSICDXPASTSURGICALHX_GEN_ALL_CORE_FT
PAST SURGICAL HISTORY:  Cardiac pacemaker     H/O colectomy 2004    H/O hernia repair     H/O ventral hernia repair     History of appendectomy ruptured    History of carpal tunnel surgery of left wrist     History of total hip replacement, bilateral

## 2022-05-01 NOTE — ED ADULT NURSE NOTE - OBJECTIVE STATEMENT
PT presents to er with complaints of epigastric pain with history of colon ca and surgical intervention from 15yrs ago, pt denies fevers, chest pain, sob, diarrhea. constipation, change in urinary pattern, abd soft, tender upon palpation to mid/upper/abd, stretcher in lowest position, call bell and family at bedside, will continue to monitor.

## 2022-05-01 NOTE — PATIENT PROFILE ADULT - FALL HARM RISK - HARM RISK INTERVENTIONS

## 2022-05-01 NOTE — H&P ADULT - ASSESSMENT
89 yo male with SBO, likely secondary to adhesions. History of HTN. Plan admit to surgery. NPO. NGT. IV lopressor for blood pressure. Admit telemetry. Cardiology and medicine eval.  Follow up X Rays. If no improvement with conservative management, exploratory laparotomy

## 2022-05-01 NOTE — ED ADULT NURSE REASSESSMENT NOTE - NS ED NURSE REASSESS COMMENT FT1
Dr Pete at bedside NGT placed 600cc of thin green secretions removed.  NGT to low continuous wall suction

## 2022-05-01 NOTE — CONSULT NOTE ADULT - ASSESSMENT
89M hx colon ca s/p resection in 2004 here with severe abdominal  pain. Symptoms started 2days ago and got worse.  Mild nausea, no vomiting/diarrhea/constipation. No fever/chills. No clear precipitating factor. Mild tenderness "but it also hurts when I'm not touching it". Claims he had a BM yesterday. Has not really been eating due to pain. The patient had ventral hernia repair in July 2021. CT abd shows high grade distal SBO.   assessment Dx:                       1. SBO                        2. HTN                       3. BPH                       4. SSS s/p PPM     Plan:   The patient has NGT to wall suction.              stated on IV lopressor for HTN              continue to monitor  on telemetry               f/u lab cbc, BMP.               Cardiology consult.                 89M hx colon ca s/p resection in 2004 here with severe abdominal  pain. Symptoms started 2days ago and got worse.  Mild nausea, no vomiting/diarrhea/constipation. No fever/chills. No clear precipitating factor. Mild tenderness "but it also hurts when I'm not touching it". Claims he had a BM yesterday. Has not really been eating due to pain. The patient had ventral hernia repair in July 2021. CT abd shows high grade distal SBO.   assessment Dx:                       1. SBO                        2. HTN                       3. BPH                       4. CKD                       5. SSS s/p PPM       Plan:   The patient has NGT to wall suction.               IV Fluids              stated on IV lopressor for HTN              continue to monitor  on telemetry               f/u lab cbc, BMP.               Cardiology consult.

## 2022-05-01 NOTE — ED ADULT NURSE NOTE - NSIMPLEMENTINTERV_GEN_ALL_ED
Implemented All Universal Safety Interventions:  [place] to call system. Call bell, personal items and telephone within reach. Instruct patient to call for assistance. Room bathroom lighting operational. Non-slip footwear when patient is off stretcher. Physically safe environment: no spills, clutter or unnecessary equipment. Stretcher in lowest position, wheels locked, appropriate side rails in place.

## 2022-05-01 NOTE — H&P ADULT - NSHPPHYSICALEXAM_GEN_ALL_CORE
VSS afeb  89 yo male WDWN in NAD  skin- warm,dry  HEENT- pupils equal sclerae anicteric  Neck- no JVD  Lungs- clear  Cor- RRR  Abd- + BS mild distension, soft non tender  Ext- no edema  Neuro A and O X 3, no deficits

## 2022-05-01 NOTE — H&P ADULT - HISTORY OF PRESENT ILLNESS
89M hx colon ca s/p resection ~15y ago here with mid-epigastric pain x 2d. Mild nausea, no vomiting/diarrhea/constipation. No fever/chills. No clear precipitating factor. Mild tenderness "but it also hurts when I'm not touching it". Claims he had a BM yesterday. Has not really been eating due to pain.

## 2022-05-01 NOTE — CONSULT NOTE ADULT - SUBJECTIVE AND OBJECTIVE BOX
CC:Patient is a 89y old  Male who presents with a chief complaint of abdominal pain       HPI:  89M hx colon ca s/p resection ~15y ago here with mid-epigastric pain x 2d. Mild nausea, no vomiting/diarrhea/constipation. No fever/chills. No clear precipitating factor. Mild tenderness "but it also hurts when I'm not touching it". Claims he had a BM yesterday. Has not really been eating due to pain. The patient had ventral hernia repair in 2021.       PMH: SSS s/p PPM, HLD, HTN, BPH, Colon CA.  PSH: Colon CA s/p  hemicolectomy , appendecotmy, bilteral PREM, , appendectomy    Meds: per reconciliation sheet, noted below  MEDICATIONS  (STANDING):  enoxaparin Injectable 30 milliGRAM(s) SubCutaneous every 24 hours  sodium chloride 0.9%. 1000 milliLiter(s) (125 mL/Hr) IV Continuous <Continuous>    MEDICATIONS  (PRN):  HYDROmorphone  Injectable 0.5 milliGRAM(s) IV Push every 6 hours PRN Severe Pain (7 - 10)  metoprolol tartrate Injectable 5 milliGRAM(s) IV Push every 6 hours PRN SBP greater than 150mmhg  ondansetron Injectable 4 milliGRAM(s) IV Push every 8 hours PRN Nausea and/or Vomiting    Allergies    amoxicillin (Other (Severe))  penicillin (Unknown)    Intolerances      Social: no smoking, no alcohol, no illegal drugs; no recent travel, no exposure to TB  FAMILY HISTORY: Father had CAD/ MI, mother  at  old age    ROS: the patient denies fever, no chills, no HA, no dizziness, no sore throat, no blurry vision, no CP, no palpitations, no SOB, no cough, no abdominal pain, no diarrhea, no N/V, no dysuria, no leg pain, no claudication, no rash, no joint aches, no rectal pain or bleeding, no night sweats    Vital Signs Last 24 Hrs  T(C): 36.4 (01 May 2022 18:40), Max: 36.9 (01 May 2022 08:44)  T(F): 97.5 (01 May 2022 18:40), Max: 98.4 (01 May 2022 08:44)  HR: 61 (01 May 2022 18:40) (61 - 78)  BP: 156/85 (01 May 2022 18:40) (129/79 - 156/85)  BP(mean): 94 (01 May 2022 14:58) (94 - 109)  RR: 18 (01 May 2022 18:40) (17 - 20)  SpO2: 99% (01 May 2022 18:40) (98% - 100%)  Daily Height in cm: 175.26 (01 May 2022 08:41)    Daily     PE:    Constitutional: alert communicating,  had NG tube  HEENT:  EOMI, PERRLA  Neck: supple  Back: no tenderness  Respiratory: clear  Cardiovascular: S1S2 regular, no murmurs  Abdomen: distended, tender , no BS ,   Genitourinary: deferred  Rectal: deferred  Musculoskeletal: no muscle tenderness, no joint swelling or tenderness  Extremities: no pedal edema  Neurological: AxOx3, moving all extremities, no focal deficits  Skin: no rashes    Labs:                        14.4   11.04 )-----------( 237      ( 01 May 2022 09:27 )             45.1     05-    136  |  100  |  31<H>  ----------------------------<  144<H>  3.9   |  31  |  1.71<H>    Ca    11.4<H>      01 May 2022 09:27    TPro  7.4  /  Alb  4.1  /  TBili  0.8  /  DBili  x   /  AST  23  /  ALT  22  /  AlkPhos  71  05-01     LIVER FUNCTIONS - ( 01 May 2022 09:27 )  Alb: 4.1 g/dL / Pro: 7.4 gm/dL / ALK PHOS: 71 U/L / ALT: 22 U/L / AST: 23 U/L / GGT: x                 Radiology: CT abd : Right hemicolectomy.High-grade distal small bowel obstruction. Other findings as discussed above.

## 2022-05-01 NOTE — ED ADULT TRIAGE NOTE - CHIEF COMPLAINT QUOTE
patient ambulatory to ED c/o abdominal pain since yesterday.  reports mid-abdominal pain rating 7/10.  notes nausea and frequent belching.  denies vomiting, diarrhea, fever/chills.

## 2022-05-01 NOTE — ED PROVIDER NOTE - OBJECTIVE STATEMENT
89M hx colon ca s/p resection ~15y ago here with mid-epigastric pain x 2d. Mild nausea, no vomiting/diarrhea/constipation. No fever/chills. No clear precipitating factor. Mild tenderness "but it also hurts when I'm not touching it"

## 2022-05-01 NOTE — H&P ADULT - NSHPLABSRESULTS_GEN_ALL_CORE
< from: CT Abdomen and Pelvis w/ IV Cont (05.01.22 @ 09:59) >    IMPRESSION:    Right hemicolectomy.    High-grade distal small bowel obstruction.    < end of copied text >

## 2022-05-02 NOTE — CHART NOTE - NSCHARTNOTEFT_GEN_A_CORE
Patient cxr for NGT placement shows tubing barely reaching stomach, NGT advanced 8cm further and repeat imaging obtained, discussed w/ Dr. Pete, still no output from NGT, advised to keep tubing in place for now. Continue NPO.

## 2022-05-02 NOTE — PROGRESS NOTE ADULT - SUBJECTIVE AND OBJECTIVE BOX
Feels well, denies abdominal pain, no flatus    VSS afeb    Abd- mild distension, + BS soft non tender    Ext- no edema

## 2022-05-02 NOTE — PROGRESS NOTE ADULT - ASSESSMENT
SBO. Continue NPO. NGT. Follow up X rays and labs. If no improvement with conservative management, consider OR

## 2022-05-02 NOTE — PROGRESS NOTE ADULT - ASSESSMENT
89M hx colon ca s/p resection in 2004 here with severe abdominal  pain. Symptoms started 2days ago and got worse.  Mild nausea, no vomiting/diarrhea/constipation. No fever/chills. No clear precipitating factor. Mild tenderness "but it also hurts when I'm not touching it". Claims he had a BM yesterday. Has not really been eating due to pain. The patient had ventral hernia repair in July 2021. CT abd shows high grade distal SBO.   assessment Dx:  MEDICATIONS  (STANDING):  chlorhexidine 4% Liquid 1 Application(s) Topical <User Schedule>  enoxaparin Injectable 30 milliGRAM(s) SubCutaneous every 24 hours  sodium chloride 0.9%. 1000 milliLiter(s) (125 mL/Hr) IV Continuous <Continuous>    MEDICATIONS  (PRN):  HYDROmorphone  Injectable 0.5 milliGRAM(s) IV Push every 6 hours PRN Severe Pain (7 - 10)  metoprolol tartrate IVPB 5 milliGRAM(s) IV Intermittent every 6 hours PRN SBP > 150  ondansetron Injectable 4 milliGRAM(s) IV Push every 8 hours PRN Nausea and/or Vomiting        SBO  YAMINI on CKD due to nausea/vomiting and decreased PO intake. Improving  HTN  BPH  SSS s/p PPM    -admitted to surgery  -NGT  -Management as per surgery  -NPO. Advance diet as per surgery.  -PO meds to IV where possiblt  -IVF as needed for fluid balance  -Continue to monitor Cr and electrolytes  -Cardiology consult    DVT Prophylaxis: Lovenox subq

## 2022-05-02 NOTE — PROGRESS NOTE ADULT - SUBJECTIVE AND OBJECTIVE BOX
CC:Patient is a 89y old  Male who presents with a chief complaint of abdominal pain       HPI:  89M hx colon ca s/p resection ~15y ago here with mid-epigastric pain x 2d. Mild nausea, no vomiting/diarrhea/constipation. No fever/chills. No clear precipitating factor. Mild tenderness "but it also hurts when I'm not touching it". Claims he had a BM yesterday. Has not really been eating due to pain. The patient had ventral hernia repair in 2021.     Subjective:   : ABdominal Pain improved. No nausea or vomiting. Passing a little flatus., Denies fever, chills, dizziness, chest pain, SOB    Review of Systems: 14 Point review of systems reviewed and reported as negative unless otherwise stated above      PMH: SSS s/p PPM, HLD, HTN, BPH, Colon CA.  PSH: Colon CA s/p  hemicolectomy , appendecotmy, bilteral PREM, , appendectomy    Meds: per reconciliation sheet, noted below  MEDICATIONS  (STANDING):  enoxaparin Injectable 30 milliGRAM(s) SubCutaneous every 24 hours  sodium chloride 0.9%. 1000 milliLiter(s) (125 mL/Hr) IV Continuous <Continuous>    MEDICATIONS  (PRN):  HYDROmorphone  Injectable 0.5 milliGRAM(s) IV Push every 6 hours PRN Severe Pain (7 - 10)  metoprolol tartrate Injectable 5 milliGRAM(s) IV Push every 6 hours PRN SBP greater than 150mmhg  ondansetron Injectable 4 milliGRAM(s) IV Push every 8 hours PRN Nausea and/or Vomiting    Allergies    amoxicillin (Other (Severe))  penicillin (Unknown)    Intolerances      Social: no smoking, no alcohol, no illegal drugs; no recent travel, no exposure to TB  FAMILY HISTORY: Father had CAD/ MI, mother  at  old age    ROS: the patient denies fever, no chills, no HA, no dizziness, no sore throat, no blurry vision, no CP, no palpitations, no SOB, no cough, no abdominal pain, no diarrhea, no N/V, no dysuria, no leg pain, no claudication, no rash, no joint aches, no rectal pain or bleeding, no night sweats    Vital Signs Last 24 Hrs  T(C): 36.6 (22 @ 07:51), Max: 36.6 (22 @ 07:51)  HR: 75 (22 @ 07:51) (70 - 75)  BP: 115/70 (22 @ 07:51) (115/70 - 135/86)  RR: 18 (22 @ 07:51) (18 - 18)  SpO2: 95% (22 @ 07:51) (95% - 98%)    PE:    Constitutional: alert communicating,  had NG tube  HEENT:  EOMI, PERRLA  Neck: supple  Back: no tenderness  Respiratory: clear  Cardiovascular: S1S2 regular, no murmurs  Abdomen: tender; non-distended; soft; no guarding; no rebound  Genitourinary: deferred  Rectal: deferred  Musculoskeletal: no muscle tenderness, no joint swelling or tenderness  Extremities: no pedal edema  Neurological: AxOx3, moving all extremities, no focal deficits  Skin: no rashes    Labs:                                  12.2   9.44  )-----------( 185      ( 02 May 2022 07:10 )             37.9     05-    142  |  106  |  30<H>  ----------------------------<  107<H>  3.6   |  29  |  1.40<H>    Ca    10.1      02 May 2022 07:10  Mg     2.0     -    TPro  7.4  /  Alb  4.1  /  TBili  0.8  /  DBili  x   /  AST  23  /  ALT  22  /  AlkPhos  71  05-    COVID-19 PCR: NotDetec (01 May 2022 09:27)    CAPILLARY BLOOD GLUCOSE                      14.4   11.04 )-----------( 237      ( 01 May 2022 09:27 )             45.1     05-    136  |  100  |  31<H>  ----------------------------<  144<H>  3.9   |  31  |  1.71<H>    Ca    11.4<H>      01 May 2022 09:27    TPro  7.4  /  Alb  4.1  /  TBili  0.8  /  DBili  x   /  AST  23  /  ALT  22  /  AlkPhos  71  -     LIVER FUNCTIONS - ( 01 May 2022 09:27 )  Alb: 4.1 g/dL / Pro: 7.4 gm/dL / ALK PHOS: 71 U/L / ALT: 22 U/L / AST: 23 U/L / GGT: x                 Radiology: CT abd : Right hemicolectomy.High-grade distal small bowel obstruction. Other findings as discussed above.

## 2022-05-02 NOTE — PROGRESS NOTE ADULT - SUBJECTIVE AND OBJECTIVE BOX
89M hx colon ca s/p resection ~15y ago here with mid-epigastric pain x 2d. Mild nausea, no vomiting/diarrhea/constipation. No fever/chills. No clear precipitating factor. Mild tenderness "but it also hurts when I'm not touching it". Claims he had a BM yesterday. Has not really been eating due to pain. Pt found to have high grade distal SBO, evaluated this morning, pt removed NGT last night, refused RN insertion of tube per RN, pt pleasant, amenable to reinsertion of NGT. Denies fevers, chills, n/v/d, bloody stools, abd pain.      PMH/PSH:PAST MEDICAL & SURGICAL HISTORY:  HTN (hypertension)    BPH (benign prostatic hyperplasia)    Ventral hernia    Colon cancer  h/o chemo 2004    Heart block    Pacemaker    HLD (hyperlipidemia)    H/O colectomy  2004    History of carpal tunnel surgery of left wrist    H/O hernia repair    History of total hip replacement, bilateral    History of appendectomy  ruptured    Cardiac pacemaker    H/O ventral hernia repair        Allergies:  amoxicillin (Other (Severe))  penicillin (Unknown)      Medications:  chlorhexidine 4% Liquid 1 Application(s) Topical <User Schedule>  enoxaparin Injectable 30 milliGRAM(s) SubCutaneous every 24 hours  HYDROmorphone  Injectable 0.5 milliGRAM(s) IV Push every 6 hours PRN  metoprolol tartrate IVPB 5 milliGRAM(s) IV Intermittent every 6 hours PRN  ondansetron Injectable 4 milliGRAM(s) IV Push every 8 hours PRN  sodium chloride 0.9%. 1000 milliLiter(s) IV Continuous <Continuous>      REVIEW OF SYSTEMS:  All other review of systems is negative unless indicated above.    Relevant Family History:   FAMILY HISTORY:      Relevant Social History:  Denies ETOH or tobacco history    Physical Exam:    Vital Signs:  Vital Signs Last 24 Hrs  T(C): 36.6 (02 May 2022 07:51), Max: 36.7 (01 May 2022 14:58)  T(F): 97.9 (02 May 2022 07:51), Max: 98 (01 May 2022 14:58)  HR: 75 (02 May 2022 07:51) (61 - 77)  BP: 115/70 (02 May 2022 07:51) (115/70 - 156/85)  BP(mean): 94 (01 May 2022 14:58) (94 - 95)  RR: 18 (02 May 2022 07:51) (17 - 18)  SpO2: 95% (02 May 2022 07:51) (95% - 100%)  Daily     Daily     Constitutional: thin elderly male in NAD  HEENT: NC/AT  Neck: supple  Respiratory: CTA B/L  Cardiac: +S1/S2; RRR  Gastrointestinal: soft, NT/ND; no rebound or guarding; hypoactive BS   Extremities: no peripheral edema  Musculoskeletal:  no joint swelling, tenderness or erythema  Vascular: 2+ radial, femoral, DP/PT pulses B/L  Skin: warm, dry and intact; no rashes, wounds, or scars  Neurologic: AAOx3; CNS grossly intact    Laboratory:                          12.2   9.44  )-----------( 185      ( 02 May 2022 07:10 )             37.9     05-02    142  |  106  |  30<H>  ----------------------------<  107<H>  3.6   |  29  |  1.40<H>    Ca    10.1      02 May 2022 07:10  Mg     2.0     05-02    TPro  7.4  /  Alb  4.1  /  TBili  0.8  /  DBili  x   /  AST  23  /  ALT  22  /  AlkPhos  71  05-01    LIVER FUNCTIONS - ( 01 May 2022 09:27 )  Alb: 4.1 g/dL / Pro: 7.4 gm/dL / ALK PHOS: 71 U/L / ALT: 22 U/L / AST: 23 U/L / GGT: x           PT/INR - ( 01 May 2022 09:27 )   PT: 10.9 sec;   INR: 0.94 ratio         PTT - ( 01 May 2022 09:27 )  PTT:44.3 sec    Amylase Serum--      Lipase [id number] U/L       Ammonia--      Intake and Output    05-01-22 @ 07:01  -  05-02-22 @ 07:00  --------------------------------------------------------  IN: 1500 mL / OUT: 800 mL / NET: 700 mL        Imaging:    < from: CT Abdomen and Pelvis w/ IV Cont (05.01.22 @ 09:59) >  ACC: [id number] EXAM:  CT ABDOMEN AND PELVIS IC                          PROCEDURE DATE:  05/01/2022          INTERPRETATION:  CLINICAL INFORMATION: Abdominal pain. History of colon   resection for colon cancer.    COMPARISON: None.    CONTRAST/COMPLICATIONS:  IV Contrast: Omnipaque 350  90 cc administered   10 cc discarded  Oral Contrast: NONE  Complications: None reported at time of study completion    PROCEDURE:  CT of the Abdomen and Pelvis was performed.  Sagittal and coronal reformats were performed.    FINDINGS:    LOWER CHEST:  Partially imaged cardiac pacemaker leads.  Minimal bibasilar atelectasis and/or fibrosis.    LIVER: There are small subcentimeter hypodense posterior dome of liver,   too small for accurate CT measurement.  BILE DUCTS: Normal caliber.  GALLBLADDER: Within normal limits. SPLEEN: Within normal limits.  PANCREAS: Mild pancreatic ductal prominence.  ADRENALS: Mild fusiform thickening bilateral adrenals.  KIDNEYS/URETERS:  Bilateral renal cysts, largest is exophytic at the lower pole of the   right kidney with small peripheral calcification.    Metallic streak artifact related to patient's bilateral hip   arthroplasties degrades image quality limiting evaluation in the pelvis.  BLADDER: Moderately distended.  REPRODUCTIVE ORGANS: Enlarged prostate with central calcification.    BOWEL:  Distended fluid-filled stomach.  Status post right hemicolectomy with transverse ileocolic anastomosis.   Dilated small bowel, transition appears in the left mid abdomen, inferior   to the level of the anastomosis.  There are collapsed distal small bowel loops.  There is colonic diverticulosis, with colonic fecal retention.  There is probable bowel wall thickening involving the sigmoid and   descending colon, which maybe related to chronic diverticular disease.  PERITONEUM: No ascites.    VESSELS: Atherosclerotic changes.  RETROPERITONEUM/LYMPH NODES: No lymphadenopathy.    ABDOMINAL WALL: Within normal limits.    BONES:  Scoliosis with degenerative changes spine.  Partially imaged is bilateral total hip arthroplasties.  Chronic right-sided rib fracture deformity.    IMPRESSION:    Right hemicolectomy.    High-grade distal small bowel obstruction.    Other findings as discussed above.    --- End of Report ---    < end of copied text >

## 2022-05-02 NOTE — PROGRESS NOTE ADULT - ASSESSMENT
A:  -89yo M w/ distal SBO  P:  -Cards recs appreciated- echo pending  -Lopressor IV  -IVF  -NPO  -F/u X-ray  -DVT ppx  -D/W Dr. Pete

## 2022-05-02 NOTE — INPATIENT CERTIFICATION FOR MEDICARE PATIENTS - RISKS OF ADVERSE EVENTS
worsening SBO requiring possible surgery/Concern for cardiopulmonary deterioration/Other:
Concern for SBO developing worsening intra-abdominal process/Other:

## 2022-05-02 NOTE — CONSULT NOTE ADULT - ASSESSMENT
patient with SBO, possible surgery with most recent cardiac w/u negative for ischemia; patient is 89 and with acute GI issue; will get ECHO but no further cardiac evaluation needed.  Patient is moderate risk due to age and acuity but on maximal medical therapy; if surgery needed, may go

## 2022-05-02 NOTE — CONSULT NOTE ADULT - SUBJECTIVE AND OBJECTIVE BOX
CHIEF COMPLAINT: Patient is a 89y old  Male who presents with a chief complaint of Small bowel obstruction (01 May 2022 22:08)      HPI:  89M hx colon ca s/p resection ~15y ago here with mid-epigastric pain x 2d. Mild nausea, no vomiting/diarrhea/constipation. No fever/chills. No clear precipitating factor. Mild tenderness "but it also hurts when I'm not touching it". Claims he had a BM yesterday. Has not really been eating due to pain. (01 May 2022 13:06)    5/2-patient known to me; in for SBO; during w/u , placed on telemetry due to need for IV beta blockers to control BP.  Patient admitted for bowel rest and decompression and consideration for surgery if no improvement in symptoms.  Cardiac issues are complete heart block with PPM, no active CAD and recent stress testing with no ischemia.  EKG with A sensing, V pacing.   CXR with left lower lung infiltrate      PMHx: PAST MEDICAL & SURGICAL HISTORY:  HTN (hypertension)    BPH (benign prostatic hyperplasia)    Ventral hernia    Colon cancer  h/o chemo 2004    Heart block    Pacemaker    HLD (hyperlipidemia)    H/O colectomy  2004    History of carpal tunnel surgery of left wrist    H/O hernia repair    History of total hip replacement, bilateral    History of appendectomy  ruptured    Cardiac pacemaker    H/O ventral hernia repair          Soc Hx:       Allergies: Allergies    amoxicillin (Other (Severe))  penicillin (Unknown)    Intolerances          REVIEW OF SYSTEMS:    CONSTITUTIONAL: No weakness, fevers or chills  EYES/ENT: No visual changes;  No vertigo or throat pain   NECK: No pain or stiffness  RESPIRATORY: cough, shortness of breath  CARDIOVASCULAR: No chest pain or palpitations  GASTROINTESTINAL: abdominal/epigastric pain. nausea, vomiting, no hematemesis; No diarrhea or constipation. No melena or hematochezia.  GENITOURINARY: No dysuria, frequency or hematuria      Vital Signs Last 24 Hrs  T(C): 36.6 (02 May 2022 07:51), Max: 36.9 (01 May 2022 08:44)  T(F): 97.9 (02 May 2022 07:51), Max: 98.4 (01 May 2022 08:44)  HR: 75 (02 May 2022 07:51) (61 - 78)  BP: 115/70 (02 May 2022 07:51) (115/70 - 156/85)  BP(mean): 94 (01 May 2022 14:58) (94 - 109)  RR: 18 (02 May 2022 07:51) (17 - 20)  SpO2: 95% (02 May 2022 07:51) (95% - 100%)    I&O's Summary    01 May 2022 07:01  -  02 May 2022 07:00  --------------------------------------------------------  IN: 1500 mL / OUT: 800 mL / NET: 700 mL        CAPILLARY BLOOD GLUCOSE          PHYSICAL EXAM:   Constitutional: NAD, awake and alert, well-developed  HEENT: PERR, EOMI, Normal Hearing, MMM  Neck: JVD  Respiratory: Breath sounds are rhonchi  Cardiovascular: S1 and S2, regular rate and rhythm, Murmurs, no gallops or rubs  Gastrointestinal: Bowel Sounds present, soft, nontender, nondistended, no guarding, no rebound  Extremities: No peripheral edema      MEDICATIONS:  MEDICATIONS  (STANDING):  enoxaparin Injectable 30 milliGRAM(s) SubCutaneous every 24 hours  sodium chloride 0.9%. 1000 milliLiter(s) (125 mL/Hr) IV Continuous <Continuous>      LABS: All Labs Reviewed:                        12.2   9.44  )-----------( 185      ( 02 May 2022 07:10 )             37.9     05-01    136  |  100  |  31<H>  ----------------------------<  144<H>  3.9   |  31  |  1.71<H>    Ca    11.4<H>      01 May 2022 09:27    TPro  7.4  /  Alb  4.1  /  TBili  0.8  /  DBili  x   /  AST  23  /  ALT  22  /  AlkPhos  71  05-01    PT/INR - ( 01 May 2022 09:27 )   PT: 10.9 sec;   INR: 0.94 ratio         PTT - ( 01 May 2022 09:27 )  PTT:44.3 sec        Blood Culture:   lipid profile       RADIOLOGY:    EKG:  A sensing, V pacing    Telemetry:    ECHO:

## 2022-05-03 NOTE — PROGRESS NOTE ADULT - ASSESSMENT
89M hx colon ca s/p resection in 2004 here with severe abdominal  pain. Symptoms started 2days ago and got worse.  Mild nausea, no vomiting/diarrhea/constipation. No fever/chills. No clear precipitating factor. Mild tenderness "but it also hurts when I'm not touching it". Claims he had a BM yesterday. Has not really been eating due to pain. The patient had ventral hernia repair in July 2021. CT abd shows high grade distal SBO.   assessment Dx:  MEDICATIONS  (STANDING):  chlorhexidine 4% Liquid 1 Application(s) Topical <User Schedule>  enoxaparin Injectable 30 milliGRAM(s) SubCutaneous every 24 hours  sodium chloride 0.9%. 1000 milliLiter(s) (125 mL/Hr) IV Continuous <Continuous>    MEDICATIONS  (PRN):  HYDROmorphone  Injectable 0.5 milliGRAM(s) IV Push every 6 hours PRN Severe Pain (7 - 10)  metoprolol tartrate IVPB 5 milliGRAM(s) IV Intermittent every 6 hours PRN SBP > 150  ondansetron Injectable 4 milliGRAM(s) IV Push every 8 hours PRN Nausea and/or Vomiting        SBO  YAMINI on CKD due to nausea/vomiting and decreased PO intake. Resolved  HTN  BPH  SSS s/p PPM    -admitted to surgery  -NGT failure and subsequent possible patient removal overnight 5/2-5/3  -Management as per surgery. Attempting conservative management at this time.   -NPO. Advance diet as per surgery. If remains NPO status for another 24-48 hours may have to transition to PPN.   -PO meds to IV where possible  -IVF as needed for fluid balance  -Continue to monitor Cr and electrolytes  -Cardiology consult    DVT Prophylaxis: Lovenox subq  Discussed with pt, son, SW/CM, RN

## 2022-05-03 NOTE — PROGRESS NOTE ADULT - SUBJECTIVE AND OBJECTIVE BOX
CC:Patient is a 89y old  Male who presents with a chief complaint of abdominal pain       HPI:  89M hx colon ca s/p resection ~15y ago here with mid-epigastric pain x 2d. Mild nausea, no vomiting/diarrhea/constipation. No fever/chills. No clear precipitating factor. Mild tenderness "but it also hurts when I'm not touching it". Claims he had a BM yesterday. Has not really been eating due to pain. The patient had ventral hernia repair in 2021.     Subjective:   /: ABdominal Pain improved. No nausea or vomiting. Passing a little flatus., Denies fever, chills, dizziness, chest pain, SOB  /3: Abdominal pain similar to yesterady. NGT failure/removal overnight. No nausea. Passing a little flatus. Still NPO and on IVF.     Review of Systems: 14 Point review of systems reviewed and reported as negative unless otherwise stated above      PMH: SSS s/p PPM, HLD, HTN, BPH, Colon CA.  PSH: Colon CA s/p  hemicolectomy , appendecotmy, bilteral PREM, , appendectomy    Meds: per reconciliation sheet, noted below  MEDICATIONS  (STANDING):  enoxaparin Injectable 30 milliGRAM(s) SubCutaneous every 24 hours  sodium chloride 0.9%. 1000 milliLiter(s) (125 mL/Hr) IV Continuous <Continuous>    MEDICATIONS  (PRN):  HYDROmorphone  Injectable 0.5 milliGRAM(s) IV Push every 6 hours PRN Severe Pain (7 - 10)  metoprolol tartrate Injectable 5 milliGRAM(s) IV Push every 6 hours PRN SBP greater than 150mmhg  ondansetron Injectable 4 milliGRAM(s) IV Push every 8 hours PRN Nausea and/or Vomiting    Allergies    amoxicillin (Other (Severe))  penicillin (Unknown)    Intolerances      Social: no smoking, no alcohol, no illegal drugs; no recent travel, no exposure to TB  FAMILY HISTORY: Father had CAD/ MI, mother  at  old age    ROS: the patient denies fever, no chills, no HA, no dizziness, no sore throat, no blurry vision, no CP, no palpitations, no SOB, no cough, no abdominal pain, no diarrhea, no N/V, no dysuria, no leg pain, no claudication, no rash, no joint aches, no rectal pain or bleeding, no night sweats    Vital Signs Last 24 Hrs  T(C): 36.6 (22 @ 07:40), Max: 37.1 (22 @ 20:20)  HR: 83 (22 @ 07:40) (66 - 83)  BP: 153/85 (22 @ 07:40) (140/84 - 153/85)  RR: 17 (22 @ 07:40) (17 - 17)  SpO2: 99% (22 @ 07:40) (94% - 99%)    PE:    Constitutional: alert communicating,  had NG tube  HEENT:  EOMI, PERRLA  Neck: supple  Back: no tenderness  Respiratory: clear  Cardiovascular: S1S2 regular, no murmurs  Abdomen: tender; non-distended; soft; no guarding; no rebound  Genitourinary: deferred  Rectal: deferred  Musculoskeletal: no muscle tenderness, no joint swelling or tenderness  Extremities: no pedal edema  Neurological: AxOx3, moving all extremities, no focal deficits  Skin: no rashes    Labs:                                  12.4   5.24  )-----------( 160      ( 03 May 2022 07:05 )             39.2     05-    143  |  109<H>  |  33<H>  ----------------------------<  97  3.7   |  27  |  1.28    Ca    10.3<H>      03 May 2022 07:05  Mg     2.0     -      COVID-19 PCR: NotDetec (01 May 2022 09:27)    CAPILLARY BLOOD GLUCOSE                                12.2   9.44  )-----------( 185      ( 02 May 2022 07:10 )             37.9     05-    142  |  106  |  30<H>  ----------------------------<  107<H>  3.6   |  29  |  1.40<H>    Ca    10.1      02 May 2022 07:10  Mg     2.0         TPro  7.4  /  Alb  4.1  /  TBili  0.8  /  DBili  x   /  AST  23  /  ALT  22  /  AlkPhos  71      COVID-19 PCR: NotDetec (01 May 2022 09:27)    CAPILLARY BLOOD GLUCOSE                      14.4   11.04 )-----------( 237      ( 01 May 2022 09:27 )             45.1         136  |  100  |  31<H>  ----------------------------<  144<H>  3.9   |  31  |  1.71<H>    Ca    11.4<H>      01 May 2022 09:27    TPro  7.4  /  Alb  4.1  /  TBili  0.8  /  DBili  x   /  AST  23  /  ALT  22  /  AlkPhos  71       LIVER FUNCTIONS - ( 01 May 2022 09:27 )  Alb: 4.1 g/dL / Pro: 7.4 gm/dL / ALK PHOS: 71 U/L / ALT: 22 U/L / AST: 23 U/L / GGT: x                 Radiology: CT abd : Right hemicolectomy.High-grade distal small bowel obstruction. Other findings as discussed above.

## 2022-05-03 NOTE — PROGRESS NOTE ADULT - SUBJECTIVE AND OBJECTIVE BOX
Feels well, no complaints. A bit confused today.  VSS afeb    Abd- still distended, soft non tender    Ext- no edema

## 2022-05-04 NOTE — PROGRESS NOTE ADULT - SUBJECTIVE AND OBJECTIVE BOX
CC:Patient is a 89y old  Male who presents with a chief complaint of abdominal pain       HPI:  89M hx colon ca s/p resection ~15y ago here with mid-epigastric pain x 2d. Mild nausea, no vomiting/diarrhea/constipation. No fever/chills. No clear precipitating factor. Mild tenderness "but it also hurts when I'm not touching it". Claims he had a BM yesterday. Has not really been eating due to pain. The patient had ventral hernia repair in 2021.     Subjective:   : ABdominal Pain improved. No nausea or vomiting. Passing a little flatus., Denies fever, chills, dizziness, chest pain, SOB  5/3: Abdominal pain similar to yesterady. NGT failure/removal overnight. No nausea. Passing a little flatus. Still NPO and on IVF.   : Abdominal pain unchanged; Off NGT; Repeat CT ->worsening SBO and ascites. Afebrile and hemodynamically stable.     Review of Systems: 14 Point review of systems reviewed and reported as negative unless otherwise stated above      PMH: SSS s/p PPM, HLD, HTN, BPH, Colon CA.  PSH: Colon CA s/p  hemicolectomy , appendecotmy, bilteral PREM, , appendectomy    Meds: per reconciliation sheet, noted below  MEDICATIONS  (STANDING):  enoxaparin Injectable 30 milliGRAM(s) SubCutaneous every 24 hours  sodium chloride 0.9%. 1000 milliLiter(s) (125 mL/Hr) IV Continuous <Continuous>    MEDICATIONS  (PRN):  HYDROmorphone  Injectable 0.5 milliGRAM(s) IV Push every 6 hours PRN Severe Pain (7 - 10)  metoprolol tartrate Injectable 5 milliGRAM(s) IV Push every 6 hours PRN SBP greater than 150mmhg  ondansetron Injectable 4 milliGRAM(s) IV Push every 8 hours PRN Nausea and/or Vomiting    Allergies    amoxicillin (Other (Severe))  penicillin (Unknown)    Intolerances      Social: no smoking, no alcohol, no illegal drugs; no recent travel, no exposure to TB  FAMILY HISTORY: Father had CAD/ MI, mother  at  old age    ROS: the patient denies fever, no chills, no HA, no dizziness, no sore throat, no blurry vision, no CP, no palpitations, no SOB, no cough, no abdominal pain, no diarrhea, no N/V, no dysuria, no leg pain, no claudication, no rash, no joint aches, no rectal pain or bleeding, no night sweats    Vital Signs Last 24 Hrs  T(C): 36.7 (22 @ 07:49), Max: 36.7 (22 @ 07:49)  HR: 61 (22 @ 07:49) (61 - 74)  BP: 131/68 (22 @ 07:49) (131/68 - 146/78)  RR: 17 (22 @ 07:49) (17 - 17)  SpO2: 93% (22 @ 07:49) (93% - 97%)    PE:    Constitutional: alert communicating,  had NG tube  HEENT:  EOMI, PERRLA  Neck: supple  Back: no tenderness  Respiratory: clear  Cardiovascular: S1S2 regular, no murmurs  Abdomen: tender; distended; soft; no guarding; no rebound; mildly hypoactive bowel sounds;   Genitourinary: deferred  Rectal: deferred  Musculoskeletal: no muscle tenderness, no joint swelling or tenderness  Extremities: no pedal edema  Neurological: AxOx3, moving all extremities, no focal deficits  Skin: no rashes    Labs:                                             12.3   4.31  )-----------( 177      ( 04 May 2022 06:51 )             38.9     05-    144  |  111<H>  |  39<H>  ----------------------------<  89  3.6   |  25  |  1.34<H>    Ca    10.4<H>      04 May 2022 06:51  Phos  2.5     05-  Mg     2.0     -    TPro  x   /  Alb  2.9<L>  /  TBili  x   /  DBili  x   /  AST  x   /  ALT  x   /  AlkPhos  x   05-    COVID-19 PCR: NotDetec (01 May 2022 09:27)    CAPILLARY BLOOD GLUCOSE                     12.4   5.24  )-----------( 160      ( 03 May 2022 07:05 )             39.2     05-    143  |  109<H>  |  33<H>  ----------------------------<  97  3.7   |  27  |  1.28    Ca    10.3<H>      03 May 2022 07:05  Mg     2.0           COVID PCR: NotDetec (01 May 2022 09:27)    CAPILLARY BLOOD GLUCOSE                                12.2   9.44  )-----------( 185      ( 02 May 2022 07:10 )             37.9     05-    142  |  106  |  30<H>  ----------------------------<  107<H>  3.6   |  29  |  1.40<H>    Ca    10.1      02 May 2022 07:10  Mg     2.0         TPro  7.4  /  Alb  4.1  /  TBili  0.8  /  DBili  x   /  AST  23  /  ALT  22  /  AlkPhos  71      COV PCR: NotDetec (01 May 2022 09:27)    CAPILLARY BLOOD GLUCOSE          Radiology: CT abd : Right hemicolectomy.High-grade distal small bowel obstruction. Other findings as discussed above.      < from: CT Abdomen and Pelvis w/ Oral Cont (22 @ 14:53) >    IMPRESSION:    Worsening small bowel obstruction with slight increase in dilatation.   Increasing ascites.    Developing small bilateral pleural effusions.    < end of copied text >    I personally reviewed labs, imaging, orders and vitals.

## 2022-05-04 NOTE — PROGRESS NOTE ADULT - ASSESSMENT
89M hx colon ca s/p resection in 2004 here with severe abdominal  pain. Symptoms started 2days ago and got worse.  Mild nausea, no vomiting/diarrhea/constipation. No fever/chills. No clear precipitating factor. Mild tenderness "but it also hurts when I'm not touching it". Claims he had a BM yesterday. Has not really been eating due to pain. The patient had ventral hernia repair in July 2021. CT abd shows high grade distal SBO.   assessment Dx:  MEDICATIONS  (STANDING):  chlorhexidine 4% Liquid 1 Application(s) Topical <User Schedule>  enoxaparin Injectable 30 milliGRAM(s) SubCutaneous every 24 hours  sodium chloride 0.9%. 1000 milliLiter(s) (125 mL/Hr) IV Continuous <Continuous>    MEDICATIONS  (PRN):  HYDROmorphone  Injectable 0.5 milliGRAM(s) IV Push every 6 hours PRN Severe Pain (7 - 10)  metoprolol tartrate IVPB 5 milliGRAM(s) IV Intermittent every 6 hours PRN SBP > 150  ondansetron Injectable 4 milliGRAM(s) IV Push every 8 hours PRN Nausea and/or Vomiting        SBO  YAMINI on CKD due to nausea/vomiting and decreased PO intake. Resolved  HTN  BPH  SSS s/p PPM    -admitted to surgery  -NGT failure and subsequent possible patient removal overnight 5/2-5/3  -Management as per surgery. Attempting conservative management at this time->No improvement as of yet with repeat CT showing worsening SBO findings +ascites  -NPO. Advance diet as per surgery  -Nutrition consult. Likely to have to start PPN vs TPN tomorrow. Nutritonist consult. Morning labs.   -PO meds to IV where possible  -IVF as needed for fluid balance  -Continue to monitor Cr and electrolytes  -Cardiology consult    DVT Prophylaxis: Lovenox subq  Discussed with pt, son, SW/CM, RN

## 2022-05-04 NOTE — PROGRESS NOTE ADULT - SUBJECTIVE AND OBJECTIVE BOX
Feels well, no flatus or BM  VSS afeb    lungs- clear  Cor- RRR  Abd- + BS soft distended, non tender  Ext- no edema

## 2022-05-05 NOTE — DIETITIAN INITIAL EVALUATION ADULT - PERTINENT MEDS FT
MEDICATIONS  (STANDING):  chlorhexidine 4% Liquid 1 Application(s) Topical <User Schedule>  dextrose 5% + sodium chloride 0.45% with potassium chloride 20 mEq/L 1000 milliLiter(s) (75 mL/Hr) IV Continuous <Continuous>  enoxaparin Injectable 30 milliGRAM(s) SubCutaneous every 24 hours    MEDICATIONS  (PRN):  HYDROmorphone  Injectable 0.5 milliGRAM(s) IV Push every 6 hours PRN Severe Pain (7 - 10)  metoprolol tartrate IVPB 5 milliGRAM(s) IV Intermittent every 6 hours PRN SBP > 150  ondansetron Injectable 4 milliGRAM(s) IV Push every 8 hours PRN Nausea and/or Vomiting

## 2022-05-05 NOTE — DIETITIAN INITIAL EVALUATION ADULT - NAME AND PHONE
Tamera Smith MA, RDN, CDN, [facility]  [phone] (office number)

## 2022-05-05 NOTE — DIETITIAN INITIAL EVALUATION ADULT - ALTERNATE MEANS OF NUTRITION
PPN RECOMMENDATIONS:    1800mL total volume via peripheral line  65g Amino Acids  100g Dextrose  0g Lipids 20% (obtain triglyceride level)  103 mEq NaCl  0 mEq NaAcetate  20 mMol NaPhos  33 mEq KCl  27 mEq KAcetate  0 mMol KPhos  0 mEq Calcium Gluconate  8 mEq Magnesium Sulfate  100 mg Thiamine  1mL trace elements  10 mL MVI    total calories: 600Kcal (meets ~37% of estimated calorie needs and ~68% of estimated protein needs) osmolarity: ~870

## 2022-05-05 NOTE — CONSULT NOTE ADULT - SUBJECTIVE AND OBJECTIVE BOX
89M hx colon ca s/p resection ~15y ago here with mid-epigastric pain x 2d. Mild nausea, no vomiting/diarrhea/constipation. No fever/chills. No clear precipitating factor. Mild tenderness "but it also hurts when I'm not touching it". Claims he had a BM yesterday. Has not really been eating due to pain. The patient had ventral hernia repair in July 2021.   CT scan today worsening SBO - for OR later today   renal eval for hypercalcemia    today    pt feeling well   awaiting surgery = Ex Lap for surgery today     wife at bedside - no complaints from family       PAST MEDICAL & SURGICAL HISTORY:  HTN (hypertension)    BPH (benign prostatic hyperplasia)    Ventral hernia    Colon cancer  h/o chemo 2004    Heart block    Pacemaker    HLD (hyperlipidemia)    H/O colectomy  2004    History of carpal tunnel surgery of left wrist    H/O hernia repair    History of total hip replacement, bilateral    History of appendectomy  ruptured    Cardiac pacemaker    H/O ventral hernia repair        Home Medications:  Advil Dual Action With Acetaminophen 250 mg-125 mg oral tablet: 2 tab(s) orally once a day, As Needed (01 May 2022 21:57)  alfuzosin 10 mg oral tablet, extended release: 1 tab(s) orally once a day (01 May 2022 21:57)  amLODIPine 5 mg oral tablet: 1 tab(s) orally once a day (01 May 2022 21:57)  azelastine nasal: nasal once a day (01 May 2022 21:57)  hydroCHLOROthiazide 25 mg oral tablet: 1 tab(s) orally once a day (01 May 2022 21:57)  losartan 25 mg oral tablet: 1/2  tab(s) orally once a day (01 May 2022 21:57)  metoprolol succinate 25 mg oral tablet, extended release: 1 tab(s) orally once a day (01 May 2022 21:57)    MEDICATIONS  (STANDING):  cefoTEtan  IVPB 1 Gram(s) IV Intermittent every 12 hours  chlorhexidine 4% Liquid 1 Application(s) Topical <User Schedule>  dextrose 5% + sodium chloride 0.45% with potassium chloride 20 mEq/L 1000 milliLiter(s) (75 mL/Hr) IV Continuous <Continuous>  enoxaparin Injectable 30 milliGRAM(s) SubCutaneous every 24 hours  lactated ringers. 1000 milliLiter(s) (100 mL/Hr) IV Continuous <Continuous>  Parenteral Nutrition - Adult 1 Each (75 mL/Hr) TPN Continuous <Continuous>      Allergies    amoxicillin (Other (Severe))  penicillin (Unknown)    Intolerances        SOCIAL HISTORY:  Denies ETOh,Smoking,     FAMILY HISTORY:      REVIEW OF SYSTEMS:    CONSTITUTIONAL: No weakness, fevers or chills  EYES/ENT: No visual changes;  No vertigo or throat pain   NECK: No pain or stiffness  RESPIRATORY: No cough, wheezing, hemoptysis; No shortness of breath  CARDIOVASCULAR: No chest pain or palpitations  GASTROINTESTINAL: No abdominal or epigastric pain. No nausea, vomiting, or hematemesis; No diarrhea or constipation. No melena or hematochezia.  GENITOURINARY: No dysuria, frequency or hematuria  NEUROLOGICAL: No numbness or weakness  SKIN: No itching, burning, rashes, or lesions   All other review of systems is negative unless indicated above.    VITAL:  Vital Signs Last 24 Hrs  T(C): 36.6 (05 May 2022 22:32), Max: 36.6 (04 May 2022 23:54)  T(F): 97.9 (05 May 2022 22:32), Max: 97.9 (05 May 2022 08:02)  HR: 100 (05 May 2022 22:32) (64 - 100)  BP: 108/77 (05 May 2022 22:32) (107/92 - 174/93)  BP(mean): --  RR: 18 (05 May 2022 22:32) (16 - 20)  SpO2: 95% (05 May 2022 22:32) (94% - 100%)      I&O's Detail    05 May 2022 07:01  -  05 May 2022 23:05  --------------------------------------------------------  IN:    Lactated Ringers: 250 mL    Other (mL): 1400 mL  Total IN: 1650 mL    OUT:    Indwelling Catheter - Urethral (mL): 1320 mL    Nasogastric/Oral tube (mL): 65 mL  Total OUT: 1385 mL    Total NET: 265 mL          05-05 @ 07:01  -  05-05 @ 23:04  --------------------------------------------------------  IN: 1650 mL / OUT: 1385 mL / NET: 265 mL          PHYSICAL EXAM:    Constitutional: fraial  HEENT:  EOMI,  MMM  Neck: No LAD, No JVD  Respiratory: CTAB  Cardiovascular: S1 and S2  Gastrointestinal: distended ++, no BS   Extremities: No peripheral edema  Neurological: A/O x 3, no focal deficits  :  De  Skin: No rashes  Access: Not applicable    LABS:                                   14.8   2.88  )-----------( 228      ( 05 May 2022 21:45 )             48.0                         13.2   4.55  )-----------( 189      ( 05 May 2022 08:07 )             40.9             146    |  112    |  35     ----------------------------<  134       05 May 2022 08:07  3.6     |  25     |  1.25     144    |  111    |  39     ----------------------------<  89        04 May 2022 06:51  3.6     |  25     |  1.34     Ca    9.9        05 May 2022 21:45  Ca    10.4       05 May 2022 08:07    Phos  2.0       05 May 2022 08:07  Phos  2.5       04 May 2022 06:51    Mg     2.1       05 May 2022 08:07  Mg     2.0       04 May 2022 06:51    TPro  5.6    /  Alb  2.9    /  TBili  0.6    /        05 May 2022 08:07  DBili  x      /  AST  23     /  ALT  15     /  AlkPhos  43       TPro  x      /  Alb  2.9    /  TBili  x      /        04 May 2022 06:51  DBili  x      /  AST  x      /  ALT  x      /  AlkPhos  x              pth = 100     RADIOLOGY & ADDITIONAL STUDIES:                         Calling regarding:PSR called Dr Masterson nurse to make sure Dr Masterson wanted pt to reschedule 08/13/19 for 6 mo visit instead. Nurse said ok to change to 6 mos.         Caller: Sheela    Timeframe for callback: no need to call    Pharmacy information verified:  No

## 2022-05-05 NOTE — CONSULT NOTE ADULT - ASSESSMENT
89 yo male with hx of colon ca resection ~ 15 years ago . Admitted with GEOVANI - CT neg for sm bowel    YAMINI on CKD  - resolving with IVF  Hypernatremia    prerenal while NPO and N, V and  D    encourage po    moving bowels     Hypercalcemia - suspected primary HPT    , Cr 1.2    avoid thiazide diuretics   trend Ca higher than 11 then continue to rise would consider Sensipar as pt is too fragile for PTH scan and possible ENT evaluation     * pt seen earlier   Thank you for the courtesy of this consult. We will follow this patient with you.   Management is subject to change if new information becomes available or patient condition changes.                      89 yo male with hx of colon ca resection ~ 15 years ago . Admitted with GEOVANI - CT for sm bowel    YAMINI on CKD  - resolving with IVF  Hypernatremia    prerenal while NPO and N, V and  D    encourage po    moving bowels     Hypercalcemia - suspected primary HPT    , Cr 1.2    avoid thiazide diuretics   trend Ca higher than 11 then continue to rise would consider Sensipar as pt is too fragile for PTH scan and possible ENT evaluation     SBO    per Surgery  - for ex lap at bedside       * pt seen earlier   Thank you for the courtesy of this consult. We will follow this patient with you.   Management is subject to change if new information becomes available or patient condition changes.                      89 yo male with hx of colon ca resection ~ 15 years ago . Admitted with GEOVANI - CT for sm bowel    YAMINI on CKD  - resolving with IVF  Hypernatremia    prerenal while NPO and N, V and  D    encourage po when able    IV hypotonic fluids    Hypercalcemia - suspected primary HPT    , Cr 1.2    avoid thiazide diuretics   trend Ca higher than 11 then would consider Sensipar as pt is too fragile for PTH scan and prob not candidate for PTH surgery      SBO    per Surgery          * pt seen earlier   Thank you for the courtesy of this consult. We will follow this patient with you.   Management is subject to change if new information becomes available or patient condition changes.

## 2022-05-05 NOTE — DIETITIAN NUTRITION RISK NOTIFICATION - ADDITIONAL COMMENTS/DIETITIAN RECOMMENDATIONS
PPN RECOMMENDATIONS:    1800mL total volume via peripheral line  65g Amino Acids  100g Dextrose  0g Lipids 20% (obtain triglyceride level)  103 mEq NaCl  0 mEq NaAcetate  20 mMol NaPhos  33 mEq KCl  27 mEq KAcetate  0 mMol KPhos  0 mEq Calcium Gluconate  8 mEq Magnesium Sulfate  100 mg Thiamine  1mL trace elements  10 mL MVI    total calories: 600Kcal (meets ~37% of estimated calorie needs and ~68% of estimated protein needs) osmolarity: ~870  Additional Recommendations  1) strict I/O's 2) POCT q6hrs with maintaining blood glucose 140-180mg/dL 3) daily lytes with magnesium and phos 4) obtain triglyceride level 5) daily wt checks to track/trend changes

## 2022-05-05 NOTE — DIETITIAN INITIAL EVALUATION ADULT - OTHER INFO
89yo male with PMH significant for HTN, HLD, ventral hernia, colon CA s/p resection (15 years ago) p/w with mid-epigastric pain x 2 days with nausea, no vomiting, diarrhea, or constipation reported.  admitted with SBO 2/2 adhesions.  Had NGT that was dislodged and pt refused to reinserted.  YAMINI 2/2 N/V.  now pending surgery today (5/5/22).    *labs reviewed:  hypernatremia, hypophosphatemia with potassium at lower end of normal range; pt starting PPN tonight, will monitor and adjust PN prn.  corrected Ca elevated, rec'd to not supplement outside of bag.  pt's vitamin D level on lower end of range, when feasible to have PO diet again, rec'd supplement vitamin D with 1000IU daily.

## 2022-05-05 NOTE — PROGRESS NOTE ADULT - ASSESSMENT
89M hx colon ca s/p resection in 2004 here with severe abdominal  pain. Symptoms started 2days ago and got worse.  Mild nausea, no vomiting/diarrhea/constipation. No fever/chills. No clear precipitating factor. Mild tenderness "but it also hurts when I'm not touching it". Claims he had a BM yesterday. Has not really been eating due to pain. The patient had ventral hernia repair in July 2021. CT abd shows high grade distal SBO.   assessment Dx:  MEDICATIONS  (STANDING):  chlorhexidine 4% Liquid 1 Application(s) Topical <User Schedule>  enoxaparin Injectable 30 milliGRAM(s) SubCutaneous every 24 hours  sodium chloride 0.9%. 1000 milliLiter(s) (125 mL/Hr) IV Continuous <Continuous>    MEDICATIONS  (PRN):  HYDROmorphone  Injectable 0.5 milliGRAM(s) IV Push every 6 hours PRN Severe Pain (7 - 10)  metoprolol tartrate IVPB 5 milliGRAM(s) IV Intermittent every 6 hours PRN SBP > 150  ondansetron Injectable 4 milliGRAM(s) IV Push every 8 hours PRN Nausea and/or Vomiting        SBO  YAMINI on CKD due to nausea/vomiting and decreased PO intake. Resolved  Hypercalcmemia (multifactorial: dehydration + primary hyperparathyroidism vs secondary hyperparathyroidism from CKD.   CKD  HTN  BPH  SSS s/p PPM    -admitted to surgery  -NGT failure and subsequent possible patient removal overnight 5/2-5/3  -Management as per surgery. Attempting conservative management at this time->No improvement as of yet with repeat CT showing worsening SBO findings +ascites  -NPO. Advance diet as per surgery  -Nutrition consult. Likely to have to start PPN vs TPN tomorrow. Nutritonist consult. Morning labs.   -PO meds to IV where possible  -IVF as needed for fluid balance  -Continue to monitor Cr and electrolytes. Replace electrolytes accordingly  -PT elevated + CKD + Mild hypercalcemia. Hypercalcemia likely multifactorial. From dehydration from decreased PO intake/nausea/vomiting + Hyperparathyroid from CKD vs Primary. Continue to monitor Ca.   -Cardiology consult  -At this point surgery has more benefit than risk. No improvement with coservative management. Expected decline without intervention. Mild hypercalcemia stable and Cardiac wise stable. Patient is moderate risk for perioperative and post operative complications however without intervention there is a much higher risk of decline. Patient currently medically optimized for surgery. Explained risks and benefits in detail and patient and family verbalized understanding.     DVT Prophylaxis: Lovenox subq  Discussed with pt, son, SW/CM, RN

## 2022-05-05 NOTE — DIETITIAN INITIAL EVALUATION ADULT - ADD RECOMMEND
1) strict I/O's 2) POCT q6hrs with maintaining blood glucose 140-180mg/dL 3) daily lytes with magnesium and phos 4) obtain triglyceride level 5) daily wt checks to track/trend changes

## 2022-05-05 NOTE — DIETITIAN INITIAL EVALUATION ADULT - PERTINENT LABORATORY DATA
05-05    146<H>  |  112<H>  |  35<H>  ----------------------------<  134<H>  3.6   |  25  |  1.25    Ca    10.4<H>      05 May 2022 08:07  Phos  2.0     05-05  Mg     2.1     05-05    TPro  5.6<L>  /  Alb  2.9<L>  /  TBili  0.6  /  DBili  x   /  AST  23  /  ALT  15  /  AlkPhos  43  05-05  Vitamin D, 25-Hydroxy: 36.6 ng/mL (05-04-22 @ 11:45)  Intact PTH: 103: PTH METHOD: Roche Calcium.: 10.8: Test Repeated mg/dL (05.04.22 @ 11:45)

## 2022-05-05 NOTE — PROGRESS NOTE ADULT - SUBJECTIVE AND OBJECTIVE BOX
CC:Patient is a 89y old  Male who presents with a chief complaint of abdominal pain       HPI:  89M hx colon ca s/p resection ~15y ago here with mid-epigastric pain x 2d. Mild nausea, no vomiting/diarrhea/constipation. No fever/chills. No clear precipitating factor. Mild tenderness "but it also hurts when I'm not touching it". Claims he had a BM yesterday. Has not really been eating due to pain. The patient had ventral hernia repair in 2021.     Subjective:   5/2: ABdominal Pain improved. No nausea or vomiting. Passing a little flatus., Denies fever, chills, dizziness, chest pain, SOB  5/3: Abdominal pain similar to yesterady. NGT failure/removal overnight. No nausea. Passing a little flatus. Still NPO and on IVF.   : Abdominal pain unchanged; Off NGT; Repeat CT ->worsening SBO and ascites. Afebrile and hemodynamically stable.   5: more distended; more pain and more nausea. Planned for ex lap today.     Review of Systems: 14 Point review of systems reviewed and reported as negative unless otherwise stated above      PMH: SSS s/p PPM, HLD, HTN, BPH, Colon CA.  PSH: Colon CA s/p  hemicolectomy , appendecotmy, bilteral PREM, , appendectomy    Meds: per reconciliation sheet, noted below  MEDICATIONS  (STANDING):  enoxaparin Injectable 30 milliGRAM(s) SubCutaneous every 24 hours  sodium chloride 0.9%. 1000 milliLiter(s) (125 mL/Hr) IV Continuous <Continuous>    MEDICATIONS  (PRN):  HYDROmorphone  Injectable 0.5 milliGRAM(s) IV Push every 6 hours PRN Severe Pain (7 - 10)  metoprolol tartrate Injectable 5 milliGRAM(s) IV Push every 6 hours PRN SBP greater than 150mmhg  ondansetron Injectable 4 milliGRAM(s) IV Push every 8 hours PRN Nausea and/or Vomiting    Allergies    amoxicillin (Other (Severe))  penicillin (Unknown)    Intolerances      Social: no smoking, no alcohol, no illegal drugs; no recent travel, no exposure to TB  FAMILY HISTORY: Father had CAD/ MI, mother  at  old age    ROS: the patient denies fever, no chills, no HA, no dizziness, no sore throat, no blurry vision, no CP, no palpitations, no SOB, no cough, no abdominal pain, no diarrhea, no N/V, no dysuria, no leg pain, no claudication, no rash, no joint aches, no rectal pain or bleeding, no night sweats    T(C): 36.6 (22 @ 08:02), Max: 36.6 (22 @ 18:12)  HR: 64 (22 @ 08:02) (64 - 92)  BP: 174/93 (22 @ 08:02) (135/81 - 174/93)  RR: 16 (22 @ 08:02) (16 - 18)  SpO2: 97% (22 @ 08:02) (94% - 97%)    PE:    Constitutional: alert communicating,  had NG tube  HEENT:  EOMI, PERRLA  Neck: supple  Back: no tenderness  Respiratory: clear  Cardiovascular: S1S2 regular, no murmurs  Abdomen: tender; distended; soft; no guarding; no rebound; hypoactive bowel sounds;   Musculoskeletal: no muscle tenderness, no joint swelling or tenderness  Extremities: no pedal edema  Neurological: AxOx3, moving all extremities, no focal deficits  Skin: no rashes    Labs:                                             13.2   4.55  )-----------( 189      ( 05 May 2022 08:07 )             40.9     05-05    146<H>  |  112<H>  |  35<H>  ----------------------------<  134<H>  3.6   |  25  |  1.25    Ca    10.4<H>      05 May 2022 08:07  Phos  2.0     05-05  Mg     2.1     05-05    TPro  5.6<L>  /  Alb  2.9<L>  /  TBili  0.6  /  DBili  x   /  AST  23  /  ALT  15  /  AlkPhos  43  05-05    COVID-19 PCR: NotDetec (01 May 2022 09:27)    CAPILLARY BLOOD GLUCOSE                                12.3   4.31  )-----------( 177      ( 04 May 2022 06:51 )             38.9     05-04    144  |  111<H>  |  39<H>  ----------------------------<  89  3.6   |  25  |  1.34<H>    Ca    10.4<H>      04 May 2022 06:51  Phos  2.5     05-04  Mg     2.0     05-    TPro  x   /  Alb  2.9<L>  /  TBili  x   /  DBili  x   /  AST  x   /  ALT  x   /  AlkPhos  x       COV PCR: NotDetec (01 May 2022 09:27)    CAPILLARY BLOOD GLUCOSE                     12.4   5.24  )-----------( 160      ( 03 May 2022 07:05 )             39.2     05-03    143  |  109<H>  |  33<H>  ----------------------------<  97  3.7   |  27  |  1.28    Ca    10.3<H>      03 May 2022 07:05  Mg     2.0     -      COV PCR: NotDetec (01 May 2022 09:27)    CAPILLARY BLOOD GLUCOSE                                12.2   9.44  )-----------( 185      ( 02 May 2022 07:10 )             37.9     05-02    142  |  106  |  30<H>  ----------------------------<  107<H>  3.6   |  29  |  1.40<H>    Ca    10.1      02 May 2022 07:10  Mg     2.0     05-02    TPro  7.4  /  Alb  4.1  /  TBili  0.8  /  DBili  x   /  AST  23  /  ALT  22  /  AlkPhos  71      COV PCR: NotDetec (01 May 2022 09:27)    CAPILLARY BLOOD GLUCOSE          Radiology: CT abd : Right hemicolectomy.High-grade distal small bowel obstruction. Other findings as discussed above.      < from: CT Abdomen and Pelvis w/ Oral Cont (22 @ 14:53) >    IMPRESSION:    Worsening small bowel obstruction with slight increase in dilatation.   Increasing ascites.    Developing small bilateral pleural effusions.    < end of copied text >    I personally reviewed labs, imaging, orders and vitals.

## 2022-05-06 NOTE — PROGRESS NOTE ADULT - ASSESSMENT
Volume depletion, possibly ATN on top of chronic renal disease. Pre op echo shows normal EF and no valve disease. Continue IV hydration and bolus as needed. Check H/H. Monitor BP and UO.

## 2022-05-06 NOTE — PROGRESS NOTE ADULT - ASSESSMENT
89M hx colon ca s/p resection in 2004 here with severe abdominal  pain. Symptoms started 2days ago and got worse.  Mild nausea, no vomiting/diarrhea/constipation. No fever/chills. No clear precipitating factor. Mild tenderness "but it also hurts when I'm not touching it". Claims he had a BM yesterday. Has not really been eating due to pain. The patient had ventral hernia repair in July 2021. CT abd shows high grade distal SBO.   assessment Dx:  MEDICATIONS  (STANDING):  chlorhexidine 4% Liquid 1 Application(s) Topical <User Schedule>  enoxaparin Injectable 30 milliGRAM(s) SubCutaneous every 24 hours  sodium chloride 0.9%. 1000 milliLiter(s) (125 mL/Hr) IV Continuous <Continuous>    MEDICATIONS  (PRN):  HYDROmorphone  Injectable 0.5 milliGRAM(s) IV Push every 6 hours PRN Severe Pain (7 - 10)  metoprolol tartrate IVPB 5 milliGRAM(s) IV Intermittent every 6 hours PRN SBP > 150  ondansetron Injectable 4 milliGRAM(s) IV Push every 8 hours PRN Nausea and/or Vomiting        SBO  YAMINI on CKD III due to nausea/vomiting and decreased PO intake. Resolved then subsequent recurrence with post op YAMINI likely due to hypovolemia/hypotension  Hypercalcmemia (multifactorial: dehydration + primary hyperparathyroidism)  HTN  BPH  SSS s/p PPM    -admitted to surgery  -NGT failure and subsequent possible patient removal overnight 5/2-5/3  -Management as per surgery. Attempting conservative management at this time->No improvement as of yet with repeat CT showing worsening SBO findings +ascites  -NPO. Advance diet as per surgery  -Nutrition consult. Likely to have to start PPN vs TPN tomorrow. Nutritonist consult. Morning labs.   -PO meds to IV where possible  -Continue to monitor Cr and electrolytes. Replace electrolytes accordingly  -PT elevated + CKD + Mild hypercalcemia. Hypercalcemia likely multifactorial. From dehydration from decreased PO intake/nausea/vomiting + Primary Hyperparathyroid  -Cardiology consult  -At this point surgery has more benefit than risk. No improvement with coservative management. Expected decline without intervention. Mild hypercalcemia stable and Cardiac wise stable. Patient is moderate risk for perioperative and post operative complications however without intervention there is a much higher risk of decline. Patient currently medically optimized for surgery. Explained risks and benefits in detail and patient and family verbalized understanding.   -5/6: Post op yamini and hypotension. On PPN. IVF as needed for fluid balance. I/Os (mullen in place). Nephro on board. Caution with pain control with hypotentions.     DVT Prophylaxis: Lovenox subq  Discussed with pt, daughter, SW/SHABBIR, RN, surgery, nephrology

## 2022-05-06 NOTE — PROGRESS NOTE ADULT - SUBJECTIVE AND OBJECTIVE BOX
Transferred to CICU for closer monitoring. Resting comfortably. Awake, alert. Offers no complaints    afeb, 90/60, RR 16 Urine clear  lungs- clear  Cor- RRR  Abd, non distended, non tender  Ext- no edema

## 2022-05-06 NOTE — DISCHARGE NOTE NURSING/CASE MANAGEMENT/SOCIAL WORK - NSDCPEFALRISK_GEN_ALL_CORE
For information on Fall & Injury Prevention, visit: [email or web address] OR  [email or web address] OR  https://www.cdc.gov/steadi/patient.html

## 2022-05-06 NOTE — CHART NOTE - NSCHARTNOTEFT_GEN_A_CORE
Clinical Nutrition PPN Follow Up Note    *89yo male admitted with high grad distal SBO, YAMINI on CKD now s/p ex lap on 5/5.  hypercalcemia 2/2 dehydration and primary hyperparathyroidism.    *current status: s/p OR for lap exp on 5/5.  NGT removed this morning (5/5), pending replacement shortly.  pt appears weak and lethargic on exam.  c/w PPN, will order dose #2.    *labs reviewed; hypernatremia; magnesium, phos, and potassium at lower end normal, will increase in PN bag.  bilirubin total WNL.  triglycerides WNL for lipids.  no POCT's documented, obtain POCT q6hrs and maintain blood glucose within 140-180mg/dL.  05-06    146<H>  |  113<H>  |  39<H>  ----------------------------<  122<H>  3.8   |  22  |  2.33<H>    Ca    9.6      06 May 2022 05:12  Phos  2.5     05-06  Mg     1.6     05-06    TPro  4.5<L>  /  Alb  2.3<L>  /  TBili  0.4  /  DBili  x   /  AST  19  /  ALT  14  /  AlkPhos  32<L>  05-06    Lipid Panel: Date/Time: 05-05-22 @ 08:07  Cholesterol, Serum: 169  Direct LDL: --  HDL Cholesterol, Serum: 63  Total Cholesterol/HDL Ration Measurement: --  Triglycerides, Serum: 118          *pertinent meds: LR (d/w surgeon to d/c), morphine, zofran    *I&O's Detail    05 May 2022 07:01  -  06 May 2022 07:00  --------------------------------------------------------  IN:    Lactated Ringers: 250 mL    Other (mL): 1400 mL  Total IN: 1650 mL    OUT:    Indwelling Catheter - Urethral (mL): 1470 mL    Nasogastric/Oral tube (mL): 115 mL  Total OUT: 1585 mL    Total NET: 65 mL    * fluid status: positive; PPN not documented. rec'd strict I/O's while on PPN. NO BM.  pt not on bowel regimen.    *Mahesh Score of 16; stg 1 PI on sacrum documented. no edema documented.    *Malnutrition: severe malnutrition in acute on chronic illness r/t decreased ability to consume sufficient nutr 2/2 SBO on CA AEB meeting <50% of ENN x 7days, severe muscle/fat wasting    Estimated Needs: based on 55Kg (wt from 5/5, bedscale wt obtained by RD)  Calories: 7063-3896 Kcal (30-35 Kcal/Kg)  Protein:   g protein (1.5-2g/Kg)  Fluids:  8392-9574 mL (30-35mL/Kg)    Diet, NPO (05-05-22 @ 19:40) [Active]      Weight History:  55Kg (5/5)  Height (cm): 175.3 (05-01-22 @ 08:41)  Weight (kg): 54.4 (05-01-22 @ 08:41)  BMI (kg/m2): 17.7 (05-01-22 @ 08:41)  BSA (m2): 1.66 (05-01-22 @ 08:41)    *will continue to monitor and adjust PN prn*    PPN Recommendations: via peripheral line  Total Volume: 2000mL  80g  Amino Acids  100g Dextrose  50g Lipids 20%  110 mEq Sodium Chloride  0 mEq Sodium Acetate  15 mmol Sodium Phosphate  18 mEq Potassium Chloride  38 mEq Potassium Acetate  10 mmol Potassium Phosphate  0 mEq Calcium Gluconate  10 mEq Magnesium Sulfate  100 mg Thiamine  1 ml Trace Elements  10 ml MVI    Total Calories   1160Kcal    (Meets  70%  of  Estimated Energy needs  and 96 %  Protein needs)(osmolarity 870)    Additional Recommendations:    1) Daily weights  2) Strict I & O's  3) Daily lyte checks including magnesium and phos  4) Weekly triglycerides/LFT checks  5) POCT q6hrs; maintain 140-180mg/dL    *will monitor and adjust treatement plan prn*  Tamera Smith MA, RDN, CDN, [facility] [phone] Clinical Nutrition PPN Follow Up Note    *89yo male admitted with high grad distal SBO, YAMINI on CKD now s/p ex lap on 5/5.  hypercalcemia 2/2 dehydration and primary hyperparathyroidism.    *pt started on PPN on 5/5 2/2 SBO and extended time NPO.    *current status: s/p OR for lap exp on 5/5.  NGT removed this morning (5/5), pending replacement shortly.  pt appears weak and lethargic on exam.  c/w PPN, will order dose #2.    *labs reviewed; hypernatremia; magnesium, phos, and potassium at lower end normal, will increase in PN bag.  bilirubin total WNL.  triglycerides WNL for lipids.  no POCT's documented, obtain POCT q6hrs and maintain blood glucose within 140-180mg/dL.  05-06    146<H>  |  113<H>  |  39<H>  ----------------------------<  122<H>  3.8   |  22  |  2.33<H>    Ca    9.6      06 May 2022 05:12  Phos  2.5     05-06  Mg     1.6     05-06    TPro  4.5<L>  /  Alb  2.3<L>  /  TBili  0.4  /  DBili  x   /  AST  19  /  ALT  14  /  AlkPhos  32<L>  05-06    Lipid Panel: Date/Time: 05-05-22 @ 08:07  Cholesterol, Serum: 169  Direct LDL: --  HDL Cholesterol, Serum: 63  Total Cholesterol/HDL Ration Measurement: --  Triglycerides, Serum: 118          *pertinent meds: LR (d/w surgeon to d/c), morphine, zofran    *I&O's Detail    05 May 2022 07:01  -  06 May 2022 07:00  --------------------------------------------------------  IN:    Lactated Ringers: 250 mL    Other (mL): 1400 mL  Total IN: 1650 mL    OUT:    Indwelling Catheter - Urethral (mL): 1470 mL    Nasogastric/Oral tube (mL): 115 mL  Total OUT: 1585 mL    Total NET: 65 mL    * fluid status: positive; PPN not documented. rec'd strict I/O's while on PPN. NO BM.  pt not on bowel regimen.    *Mahesh Score of 16; stg 1 PI on sacrum documented. no edema documented.    *Malnutrition: severe malnutrition in acute on chronic illness r/t decreased ability to consume sufficient nutr 2/2 SBO on CA AEB meeting <50% of ENN x 7days, severe muscle/fat wasting    Estimated Needs: based on 55Kg (wt from 5/5, bedscale wt obtained by RD)  Calories: 6043-4990 Kcal (30-35 Kcal/Kg)  Protein:   g protein (1.5-2g/Kg)  Fluids:  1487-7093 mL (30-35mL/Kg)    Diet, NPO (05-05-22 @ 19:40) [Active]      Weight History:  55Kg (5/5)  Height (cm): 175.3 (05-01-22 @ 08:41)  Weight (kg): 54.4 (05-01-22 @ 08:41)  BMI (kg/m2): 17.7 (05-01-22 @ 08:41)  BSA (m2): 1.66 (05-01-22 @ 08:41)    *will continue to monitor and adjust PN prn*    PPN Recommendations: via peripheral line  Total Volume: 2000mL  80g  Amino Acids  100g Dextrose  50g Lipids 20%  110 mEq Sodium Chloride  0 mEq Sodium Acetate  15 mmol Sodium Phosphate  18 mEq Potassium Chloride  38 mEq Potassium Acetate  10 mmol Potassium Phosphate  0 mEq Calcium Gluconate  10 mEq Magnesium Sulfate  100 mg Thiamine  1 ml Trace Elements  10 ml MVI    Total Calories   1160Kcal    (Meets  70%  of  Estimated Energy needs  and 96 %  Protein needs)(osmolarity 870)    Additional Recommendations:    1) Daily weights  2) Strict I & O's  3) Daily lyte checks including magnesium and phos  4) Weekly triglycerides/LFT checks  5) POCT q6hrs; maintain 140-180mg/dL    *will monitor and adjust treatement plan prn*  Tamera Smith MA, RDN, CDN, CNSC [phone]

## 2022-05-06 NOTE — PROGRESS NOTE ADULT - SUBJECTIVE AND OBJECTIVE BOX
Pt resting in bed, + mild Abdominal"soreness" , no N/V, CP or dyspnea.    Vital Signs Last 24 Hrs  T(C): 36.6 (05 May 2022 22:32), Max: 36.6 (05 May 2022 16:37)  T(F): 97.9 (05 May 2022 22:32), Max: 97.9 (05 May 2022 16:37)  HR: 57 (06 May 2022 09:53) (57 - 100)  BP: 105/50 (06 May 2022 09:53) (93/45 - 162/92)  BP(mean): --  RR: 18 (06 May 2022 07:44) (16 - 20)  SpO2: 94% (06 May 2022 07:44) (94% - 100%)    I&O's Detail    05 May 2022 07:01  -  06 May 2022 07:00  --------------------------------------------------------  IN:    Lactated Ringers: 250 mL    Other (mL): 1400 mL  Total IN: 1650 mL    OUT:    Indwelling Catheter - Urethral (mL): 1470 mL    Nasogastric/Oral tube (mL): 115 mL  Total OUT: 1585 mL    Total NET: 65 mL    MEDICATIONS  (STANDING):  cefoTEtan  IVPB 1 Gram(s) IV Intermittent every 12 hours  chlorhexidine 4% Liquid 1 Application(s) Topical <User Schedule>  dextrose 5% + sodium chloride 0.45% with potassium chloride 20 mEq/L 1000 milliLiter(s) (75 mL/Hr) IV Continuous <Continuous>  enoxaparin Injectable 30 milliGRAM(s) SubCutaneous every 24 hours  fat emulsion (Fish Oil and Plant Based) 20% Infusion 0.92 Gm/kG/Day (20.9 mL/Hr) IV Continuous <Continuous>  lactated ringers. 1000 milliLiter(s) (100 mL/Hr) IV Continuous <Continuous>  lidocaine 2% Jelly 5 milliLiter(s) IntraUrethral once  Parenteral Nutrition - Adult 1 Each (83 mL/Hr) TPN Continuous <Continuous>  Parenteral Nutrition - Adult 1 Each (75 mL/Hr) TPN Continuous <Continuous>    MEDICATIONS  (PRN):  metoprolol tartrate IVPB 5 milliGRAM(s) IV Intermittent every 6 hours PRN SBP > 150  morphine  - Injectable 4 milliGRAM(s) IV Push every 4 hours PRN Severe Pain (7 - 10)  ondansetron Injectable 4 milliGRAM(s) IV Push every 8 hours PRN Nausea and/or Vomiting  prochlorperazine   Injectable 10 milliGRAM(s) IntraMuscular every 8 hours PRN Nasuea/Vomiting    Gen: NAD    Heart: S1S2 RRR    Lungs: CTA B/L    Abd: ND, + BS, soft, + incisional tenderness, no rebound or guarding    Extremities: : No edema, + Venodynes on, B/L NT    A/P:  POD#1 S/P ex-Lap, MEAGAN, SBR   NPO  continue PPN, D/C IVF  Pain control  DVT PPX  Pt pulled out NGT, Min output (50 ml) overnight, No N/V  Pt refused reinsertion of NGT  Above as discussed with Dr Pete

## 2022-05-06 NOTE — PROGRESS NOTE ADULT - SUBJECTIVE AND OBJECTIVE BOX
CC:Patient is a 89y old  Male who presents with a chief complaint of abdominal pain       HPI:  89M hx colon ca s/p resection ~15y ago here with mid-epigastric pain x 2d. Mild nausea, no vomiting/diarrhea/constipation. No fever/chills. No clear precipitating factor. Mild tenderness "but it also hurts when I'm not touching it". Claims he had a BM yesterday. Has not really been eating due to pain. The patient had ventral hernia repair in 2021.     Subjective:   : ABdominal Pain improved. No nausea or vomiting. Passing a little flatus., Denies fever, chills, dizziness, chest pain, SOB  5/3: Abdominal pain similar to yesterady. NGT failure/removal overnight. No nausea. Passing a little flatus. Still NPO and on IVF.   : Abdominal pain unchanged; Off NGT; Repeat CT ->worsening SBO and ascites. Afebrile and hemodynamically stable.   : more distended; more pain and more nausea. Planned for ex lap today.   : s/p ex lap. Post op hypotension and rise in Cr. IVF boluses ordered to maintain MAP    Review of Systems: 14 Point review of systems reviewed and reported as negative unless otherwise stated above      PMH: SSS s/p PPM, HLD, HTN, BPH, Colon CA.  PSH: Colon CA s/p  hemicolectomy , appendecotmy, bilteral PREM, , appendectomy    Meds: per reconciliation sheet, noted below  MEDICATIONS  (STANDING):  enoxaparin Injectable 30 milliGRAM(s) SubCutaneous every 24 hours  sodium chloride 0.9%. 1000 milliLiter(s) (125 mL/Hr) IV Continuous <Continuous>    MEDICATIONS  (PRN):  HYDROmorphone  Injectable 0.5 milliGRAM(s) IV Push every 6 hours PRN Severe Pain (7 - 10)  metoprolol tartrate Injectable 5 milliGRAM(s) IV Push every 6 hours PRN SBP greater than 150mmhg  ondansetron Injectable 4 milliGRAM(s) IV Push every 8 hours PRN Nausea and/or Vomiting    Allergies    amoxicillin (Other (Severe))  penicillin (Unknown)    Intolerances      Social: no smoking, no alcohol, no illegal drugs; no recent travel, no exposure to TB  FAMILY HISTORY: Father had CAD/ MI, mother  at  old age    ROS: the patient denies fever, no chills, no HA, no dizziness, no sore throat, no blurry vision, no CP, no palpitations, no SOB, no cough, no abdominal pain, no diarrhea, no N/V, no dysuria, no leg pain, no claudication, no rash, no joint aches, no rectal pain or bleeding, no night sweats    T(C): 36.6 (22 @ 22:32), Max: 36.6 (22 @ 16:37)  HR: 57 (22 @ 09:53) (57 - 100)  BP: 105/50 (22 @ 09:53) (93/45 - 162/92)  RR: 18 (22 @ 07:44) (16 - 20)  SpO2: 94% (22 @ 07:44) (94% - 100%)  PE:    Constitutional: alert communicating, lethargic  HEENT:  EOMI, PERRLA; Dry mucous membranes  Neck: supple  Back: no tenderness  Respiratory: clear  Cardiovascular: S1S2 regular, no murmurs  Abdomen: appropriately tenders soft; ; hypoactive bowel sounds; Midline surgical inscision site with soft surgical dressing; clean and dry  : Kc with clear urine (low volume)  Musculoskeletal: no muscle tenderness, no joint swelling or tenderness  Extremities: no pedal edema  Neurological: AxOx2, moving all extremities, no focal deficits  Skin:  As per Abdominal exam    Labs:                                    14.6   5.44  )-----------( 208      ( 06 May 2022 05:12 )             44.7     -    146<H>  |  113<H>  |  39<H>  ----------------------------<  122<H>  3.8   |  22  |  2.33<H>    Ca    9.6      06 May 2022 05:12  Phos  2.5     05-  Mg     1.6     -    TPro  4.5<L>  /  Alb  2.3<L>  /  TBili  0.4  /  DBili  x   /  AST  19  /  ALT  14  /  AlkPhos  32<L>  06    COVID-19 PCR: NotDetec (01 May 2022 09:27)    CAPILLARY BLOOD GLUCOSE    Triglycerides, Serum: 98 mg/dL (22 @ 05:12) Vitamin D, 25-Hydroxy: 36.6:Parathyroid Hormone Intact, Serum (22 @ 11:45)   Calcium.: 10.8:                                   13.2   4.55  )-----------( 189      ( 05 May 2022 08:07 )             40.9     05-05    146<H>  |  112<H>  |  35<H>  ----------------------------<  134<H>  3.6   |  25  |  1.25    Ca    10.4<H>      05 May 2022 08:07  Phos  2.0     05-05  Mg     2.1     05-05    TPro  5.6<L>  /  Alb  2.9<L>  /  TBili  0.6  /  DBili  x   /  AST  23  /  ALT  15  /  AlkPhos  43  05-05    COVID- PCR: NotDete (01 May 2022 09:27)    CAPILLARY BLOOD GLUCOSE                                12.3   4.31  )-----------( 177      ( 04 May 2022 06:51 )             38.9     05-04    144  |  111<H>  |  39<H>  ----------------------------<  89  3.6   |  25  |  1.34<H>    Ca    10.4<H>      04 May 2022 06:51  Phos  2.5     05-04  Mg     2.0     05-04    TPro  x   /  Alb  2.9<L>  /  TBili  x   /  DBili  x   /  AST  x   /  ALT  x   /  AlkPhos  x   -    COVID- PCR: NotDete (01 May 2022 09:27)    CAPILLARY BLOOD GLUCOSE          Radiology: CT abd : Right hemicolectomy.High-grade distal small bowel obstruction. Other findings as discussed above.      < from: CT Abdomen and Pelvis w/ Oral Cont (22 @ 14:53) >    IMPRESSION:    Worsening small bowel obstruction with slight increase in dilatation.   Increasing ascites.    Developing small bilateral pleural effusions.    < end of copied text >    I personally reviewed labs, imaging, orders and vitals.

## 2022-05-06 NOTE — CHART NOTE - NSCHARTNOTEFT_GEN_A_CORE
Case discussed with Dr. De La Fuente and ICU PA.  Pt will go to CICU tonight to monitor urine output and vitals more closely. Dr. Juan R ott.

## 2022-05-06 NOTE — PROGRESS NOTE ADULT - ASSESSMENT
89 yo male with hx of colon ca resection ~ 15 years ago . 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 Admitted with GEOVANI - CT for sm bowel    YAMINI Cr rising  - sec to ATN vs Prerenal postop w hypotension and   Oliguric    POD#1 SBO repair    keep sbp > 110    Hb stable    no ACE or ARB     no IV contrast   No NSAID's    continue IVF boluses, if SBP remains < 90 may require pressors   ? 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

## 2022-05-06 NOTE — PROGRESS NOTE ADULT - SUBJECTIVE AND OBJECTIVE BOX
89M hx colon ca s/p resection ~15y ago here with mid-epigastric pain x 2d. Mild nausea, no vomiting/diarrhea/constipation. No fever/chills. No clear precipitating factor. Mild tenderness "but it also hurts when I'm not touching it". Claims he had a BM yesterday. Has not really been eating due to pain. The patient had ventral hernia repair in July 2021.   CT scan today worsening SBO - for OR later today   renal eval for hypercalcemia    today    pt feeling ok  - POD #1  ex lap /SBO repair    hypotensive w decreasing UOP       PAST MEDICAL & SURGICAL HISTORY:  HTN (hypertension)    BPH (benign prostatic hyperplasia)    Ventral hernia    Colon cancer  h/o chemo 2004    Heart block    Pacemaker    HLD (hyperlipidemia)    H/O colectomy  2004    History of carpal tunnel surgery of left wrist    H/O hernia repair    History of total hip replacement, bilateral    History of appendectomy  ruptured    Cardiac pacemaker    H/O ventral hernia repair        Home Medications:  Advil Dual Action With Acetaminophen 250 mg-125 mg oral tablet: 2 tab(s) orally once a day, As Needed (01 May 2022 21:57)  alfuzosin 10 mg oral tablet, extended release: 1 tab(s) orally once a day (01 May 2022 21:57)  amLODIPine 5 mg oral tablet: 1 tab(s) orally once a day (01 May 2022 21:57)  azelastine nasal: nasal once a day (01 May 2022 21:57)  hydroCHLOROthiazide 25 mg oral tablet: 1 tab(s) orally once a day (01 May 2022 21:57)  losartan 25 mg oral tablet: 1/2  tab(s) orally once a day (01 May 2022 21:57)  metoprolol succinate 25 mg oral tablet, extended release: 1 tab(s) orally once a day (01 May 2022 21:57)    MEDICATIONS  (STANDING):  cefoTEtan  IVPB 1 Gram(s) IV Intermittent every 12 hours  chlorhexidine 4% Liquid 1 Application(s) Topical <User Schedule>  dextrose 5% + sodium chloride 0.45% with potassium chloride 20 mEq/L 1000 milliLiter(s) (75 mL/Hr) IV Continuous <Continuous>  enoxaparin Injectable 30 milliGRAM(s) SubCutaneous every 24 hours  fat emulsion (Fish Oil and Plant Based) 20% Infusion 0.92 Gm/kG/Day (20.9 mL/Hr) IV Continuous <Continuous>  lidocaine 2% Jelly 5 milliLiter(s) IntraUrethral once  Parenteral Nutrition - Adult 1 Each (83 mL/Hr) TPN Continuous <Continuous>  Parenteral Nutrition - Adult 1 Each (75 mL/Hr) TPN Continuous <Continuous>      Allergies    amoxicillin (Other (Severe))  penicillin (Unknown)    Intolerances        SOCIAL HISTORY:  Denies ETOh,Smoking,     FAMILY HISTORY:      REVIEW OF SYSTEMS:    CONSTITUTIONAL: No , fevers or chills  EYES/ENT: No visual changes;  No vertigo or throat pain   NECK: No pain or stiffness  RESPIRATORY: No cough, wheezing, hemoptysis; No shortness of breath  CARDIOVASCULAR: No chest pain or palpitations  GASTROINTESTINAL: No abdominal or epigastric pain. No nausea, vomiting, or hematemesis; No diarrhea or constipation. No melena or hematochezia.  GENITOURINARY: No dysuria, frequency or hematuria  NEUROLOGICAL: No numbness or weakness  SKIN: No itching, burning, rashes, or lesions   All other review of systems is negative unless indicated above.    VITAL:  Vital Signs Last 24 Hrs  T(C): 36.7 (06 May 2022 18:48), Max: 36.7 (06 May 2022 18:48)  T(F): 98.1 (06 May 2022 18:48), Max: 98.1 (06 May 2022 18:48)  HR: 94 (06 May 2022 22:00) (52 - 104)  BP: 85/63 (06 May 2022 22:00) (85/45 - 111/65)  BP(mean): 67 (06 May 2022 22:00) (65 - 67)  RR: 17 (06 May 2022 22:00) (17 - 22)  SpO2: 97% (06 May 2022 22:00) (93% - 97%)    I&O's Detail    05 May 2022 07:01  -  06 May 2022 07:00  --------------------------------------------------------  IN:    Lactated Ringers: 250 mL    Other (mL): 1400 mL  Total IN: 1650 mL    OUT:    Indwelling Catheter - Urethral (mL): 1470 mL    Nasogastric/Oral tube (mL): 115 mL  Total OUT: 1585 mL    Total NET: 65 mL      06 May 2022 07:01  -  06 May 2022 23:00  --------------------------------------------------------  IN:  Total IN: 0 mL    OUT:    Indwelling Catheter - Urethral (mL): 80 mL  Total OUT: 80 mL    Total NET: -80 mL          I&O's Detail    05 May 2022 07:01  -  05 May 2022 23:05  --------------------------------------------------------  IN:    Lactated Ringers: 250 mL    Other (mL): 1400 mL  Total IN: 1650 mL    OUT:    Indwelling Catheter - Urethral (mL): 1320 mL    Nasogastric/Oral tube (mL): 65 mL  Total OUT: 1385 mL    Total NET: 265 mL      PHYSICAL EXAM:    Constitutional: frail  HEENT:  EOMI,  MMM  Neck: No LAD, No JVD  Respiratory: CTAB  Cardiovascular: S1 and S2  Gastrointestinal: distended   Extremities: No peripheral edema  Neurological: A/O x 3, no focal deficits  :  Kc  Skin: No rashes  Access: Not applicable    LABS:                                        14.6   5.44  )-----------( 208      ( 06 May 2022 05:12 )             44.7                         14.8   2.88  )-----------( 228      ( 05 May 2022 21:45 )             48.0         146    |  115    |  46     ----------------------------<  91        06 May 2022 17:12  4.3     |  24     |  2.33     146    |  113    |  39     ----------------------------<  122       06 May 2022 05:12  3.8     |  22     |  2.33     145    |  111    |  33     ----------------------------<  141       05 May 2022 21:45  3.4     |  29     |  1.79     Ca    9.4        06 May 2022 17:12  Ca    9.6        06 May 2022 05:12    Phos  2.5       06 May 2022 05:12  Phos  2.0       05 May 2022 08:07    Mg     1.6       06 May 2022 05:12  Mg     2.1       05 May 2022 08:07    TPro  4.5    /  Alb  2.3    /  TBili  0.4    /        06 May 2022 05:12  DBili  x      /  AST  19     /  ALT  14     /  AlkPhos  32       TPro  5.6    /  Alb  2.9    /  TBili  0.6    /        05 May 2022 08:07  DBili  x      /  AST  23     /  ALT  15     /  AlkPhos  43               pth = 100     RADIOLOGY & ADDITIONAL STUDIES:

## 2022-05-06 NOTE — PROGRESS NOTE ADULT - ASSESSMENT
Stable s/p Expl lap, SBR for high grade SBO. Pt likely volume depleted with low urine output, elevated CR. Will bolus IVF to treat low UO. Follow up labs. Continue IV abx.

## 2022-05-06 NOTE — PROGRESS NOTE ADULT - ASSESSMENT
89yo male now POD 1 s/p ex lap MEAGAN SBR  YAMINI on CKI now seems to be worsening    Oliguria not responsive to 500cc bolus NS today and now hypotensive.  Developing hypernatremic hyperchloremic acidosis (NS boluses?)    Concern for developing hemodynamic instability and worsening YAMINI  Follow up with hospitalist and nephrology ASAP    Continue close observation     DW Dr. Pete- requesting transfer to [facility] floor

## 2022-05-06 NOTE — PROGRESS NOTE ADULT - SUBJECTIVE AND OBJECTIVE BOX
Progress Note- Surgery      Called by RN that pt remains oliguric and hypotensive  Reported BP 88/45  BLadder scan with 16cc urine  TOTAL UO today ~50cc    Pt seen - in bed awake and alert "grumpy" but seems baseline      MEDICATIONS  (STANDING):  cefoTEtan  IVPB 1 Gram(s) IV Intermittent every 12 hours  chlorhexidine 4% Liquid 1 Application(s) Topical <User Schedule>  dextrose 5% + sodium chloride 0.45% with potassium chloride 20 mEq/L 1000 milliLiter(s) (75 mL/Hr) IV Continuous <Continuous>  enoxaparin Injectable 30 milliGRAM(s) SubCutaneous every 24 hours  fat emulsion (Fish Oil and Plant Based) 20% Infusion 0.92 Gm/kG/Day (20.9 mL/Hr) IV Continuous <Continuous>  lidocaine 2% Jelly 5 milliLiter(s) IntraUrethral once  Parenteral Nutrition - Adult 1 Each (83 mL/Hr) TPN Continuous <Continuous>  Parenteral Nutrition - Adult 1 Each (75 mL/Hr) TPN Continuous <Continuous>    MEDICATIONS  (PRN):  metoprolol tartrate IVPB 5 milliGRAM(s) IV Intermittent every 6 hours PRN SBP > 150  ondansetron Injectable 4 milliGRAM(s) IV Push every 8 hours PRN Nausea and/or Vomiting  prochlorperazine   Injectable 10 milliGRAM(s) IntraMuscular every 8 hours PRN Nasuea/Vomiting      Vital Signs Last 24 Hrs  T(C): 36.4 (06 May 2022 16:01), Max: 36.6 (05 May 2022 21:45)  T(F): 97.5 (06 May 2022 16:01), Max: 97.9 (05 May 2022 22:32)  HR: 55 (06 May 2022 17:45) (52 - 100)  BP: 85/45 (06 May 2022 17:45) (85/45 - 150/83)  BP(mean): --  RR: 18 (06 May 2022 17:45) (16 - 20)  SpO2: 93% (06 May 2022 17:45) (93% - 100%)    PHYSICAL EXAM:    Constitutional:    Awake and responsive in bed    ENMT:  oral mucosa dry    Respiratory:   Rhonchi but no notable rales    Cardiovascular:    RR   rate ~60    Gastrointestinal:   Wound clean no drainage    Genitourinary:   Kc in place draining scant dark urine    Extremities: no CCE    I&O's Detail    05 May 2022 07:01  -  06 May 2022 07:00  --------------------------------------------------------  IN:    Lactated Ringers: 250 mL    Other (mL): 1400 mL  Total IN: 1650 mL    OUT:    Indwelling Catheter - Urethral (mL): 1470 mL   UO today 50cc TOTAL     Nasogastric/Oral tube (mL): 115 mL  Total OUT: 1585 mL    Total NET: 65 mL          LABS:                        14.6   5.44  )-----------( 208      ( 06 May 2022 05:12 )             44.7     05-06    146<H>  |  115<H>  |  46<H>  ----------------------------<  91  4.3   |  24  |  2.33<H>    Ca    9.4      06 May 2022 17:12  Phos  2.5     05-06  Mg     1.6     05-06    TPro  4.5<L>  /  Alb  2.3<L>  /  TBili  0.4  /  DBili  x   /  AST  19  /  ALT  14  /  AlkPhos  32<L>  05-06

## 2022-05-06 NOTE — PROGRESS NOTE ADULT - SUBJECTIVE AND OBJECTIVE BOX
Resting comfortably, no acute distress  VSS afeb    lungs- clear  Cor- RRR  Abd- + BS soft, incisional pain  Ext- no edema

## 2022-05-06 NOTE — DISCHARGE NOTE NURSING/CASE MANAGEMENT/SOCIAL WORK - PATIENT PORTAL LINK FT
You can access the FollowMyHealth Patient Portal offered by [facility] by registering at the following website: [email or web address]. By joining [facility]’s FollowMyHealth portal, you will also be able to view your health information using other applications (apps) compatible with our system.

## 2022-05-07 NOTE — PROGRESS NOTE ADULT - ASSESSMENT
89 yo male with hx of colon ca resection ~ 15 years ago . Admitted with GEOVANI - CT for sm bowel    YAMINI Cr rising  - sec to ATN vs Prerenal postop w hypotension and   Oliguric    POD#1 SBO repair    keep sbp > 110    Hb stable    no ACE or ARB     no IV contrast   No NSAID's    continue IVF boluses, if SBP remains < 90 may require pressors   ? 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 Admitted with GEOVANI - CT for sm bowel    YAMINI Cr rising  - sec to ATN vs Prerenal postop w hypotension and   Oliguric    POD#1 SBO repair    keep sbp > 110    Hb stable    no ACE or ARB     no IV contrast   No NSAID's    continue IVF boluses, if SBP remains < 90 may require pressors   ? 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

## 2022-05-07 NOTE — PROGRESS NOTE ADULT - SUBJECTIVE AND OBJECTIVE BOX
89M hx colon ca s/p resection ~15y ago here with mid-epigastric pain x 2d. Mild nausea, no vomiting/diarrhea/constipation. No fever/chills. No clear precipitating factor. Mild tenderness "but it also hurts when I'm not touching it". Claims he had a BM yesterday. Has not really been eating due to pain. The patient had ventral hernia repair in July 2021.   CT scan today worsening SBO - for OR later today   renal eval for hypercalcemia    today    pt feeling ok  - POD #1  ex lap /SBO repair    hypotensive w decreasing UOP       PAST MEDICAL & SURGICAL HISTORY:  HTN (hypertension)    BPH (benign prostatic hyperplasia)    Ventral hernia    Colon cancer  h/o chemo 2004    Heart block    Pacemaker    HLD (hyperlipidemia)    H/O colectomy  2004    History of carpal tunnel surgery of left wrist    H/O hernia repair    History of total hip replacement, bilateral    History of appendectomy  ruptured    Cardiac pacemaker    H/O ventral hernia repair        MEDICATIONS  (STANDING):  cefoTEtan  IVPB 1 Gram(s) IV Intermittent every 12 hours  chlorhexidine 4% Liquid 1 Application(s) Topical <User Schedule>  dextrose 5% + sodium chloride 0.45% with potassium chloride 20 mEq/L 1000 milliLiter(s) (75 mL/Hr) IV Continuous <Continuous>  enoxaparin Injectable 30 milliGRAM(s) SubCutaneous every 24 hours  fat emulsion (Fish Oil and Plant Based) 20% Infusion 0.92 Gm/kG/Day (20.9 mL/Hr) IV Continuous <Continuous>  fat emulsion (Fish Oil and Plant Based) 20% Infusion 0.92 Gm/kG/Day (20.83 mL/Hr) IV Continuous <Continuous>  lidocaine 2% Jelly 5 milliLiter(s) IntraUrethral once  Parenteral Nutrition - Adult 1 Each (83 mL/Hr) TPN Continuous <Continuous>  Parenteral Nutrition - Adult 1 Each (83 mL/Hr) TPN Continuous <Continuous>    MEDICATIONS  (PRN):  metoprolol tartrate IVPB 5 milliGRAM(s) IV Intermittent every 6 hours PRN SBP > 150  ondansetron Injectable 4 milliGRAM(s) IV Push every 8 hours PRN Nausea and/or Vomiting  prochlorperazine   Injectable 10 milliGRAM(s) IntraMuscular every 8 hours PRN Nasuea/Vomiting      I&O's Detail    06 May 2022 07:01  -  07 May 2022 07:00  --------------------------------------------------------  IN:  Total IN: 0 mL    OUT:    Indwelling Catheter - Urethral (mL): 580 mL  Total OUT: 580 mL    Total NET: -580 mL            Allergies    amoxicillin (Other (Severe))  penicillin (Unknown)    Intolerances        SOCIAL HISTORY:  Denies ETOh,Smoking,     FAMILY HISTORY:      REVIEW OF SYSTEMS:    CONSTITUTIONAL: No , fevers or chills  EYES/ENT: No visual changes;  No vertigo or throat pain   NECK: No pain or stiffness  RESPIRATORY: No cough, wheezing, hemoptysis; No shortness of breath  CARDIOVASCULAR: No chest pain or palpitations  GASTROINTESTINAL: No abdominal or epigastric pain. No nausea, vomiting, or hematemesis; No diarrhea or constipation. No melena or hematochezia.  GENITOURINARY: No dysuria, frequency or hematuria  NEUROLOGICAL: No numbness or weakness  SKIN: No itching, burning, rashes, or lesions   All other review of systems is negative unless indicated above.    ICU Vital Signs Last 24 Hrs  T(C): 37.1 (07 May 2022 05:00), Max: 37.1 (07 May 2022 05:00)  T(F): 98.8 (07 May 2022 05:00), Max: 98.8 (07 May 2022 05:00)  HR: 107 (07 May 2022 05:00) (52 - 107)  BP: 122/57 (07 May 2022 05:00) (85/45 - 137/101)  BP(mean): 66 (07 May 2022 05:00) (60 - 111)  ABP: --  ABP(mean): --  RR: 21 (07 May 2022 05:00) (17 - 25)  SpO2: 97% (07 May 2022 05:00) (93% - 97%)        PHYSICAL EXAM:    Constitutional: frail  HEENT:  EOMI,  MMM  Neck: No LAD, No JVD  Respiratory: CTAB  Cardiovascular: S1 and S2  Gastrointestinal: distended   Extremities: No peripheral edema  Neurological: A/O x 3, no focal deficits  :  Kc  Skin: No rashes  Access: Not applicable    LABS:                                     11.9   15.62 )-----------( 150      ( 07 May 2022 03:55 )             36.9                                14.6   5.44  )-----------( 208      ( 06 May 2022 05:12 )             44.7                         14.8   2.88  )-----------( 228      ( 05 May 2022 21:45 )             48.0     05-07    146<H>  |  116<H>  |  51<H>  ----------------------------<  91  4.2   |  23  |  2.26<H>    Ca    9.6      07 May 2022 03:55  Phos  2.6     05-07  Mg     2.0     05-07    TPro  4.3<L>  /  Alb  1.9<L>  /  TBili  0.3  /  DBili  x   /  AST  21  /  ALT  15  /  AlkPhos  38<L>  05-07          146    |  115    |  46     ----------------------------<  91        06 May 2022 17:12  4.3     |  24     |  2.33     146    |  113    |  39     ----------------------------<  122       06 May 2022 05:12  3.8     |  22     |  2.33     145    |  111    |  33     ----------------------------<  141       05 May 2022 21:45  3.4     |  29     |  1.79     Ca    9.4        06 May 2022 17:12  Ca    9.6        06 May 2022 05:12    Phos  2.5       06 May 2022 05:12  Phos  2.0       05 May 2022 08:07    Mg     1.6       06 May 2022 05:12  Mg     2.1       05 May 2022 08:07    TPro  4.5    /  Alb  2.3    /  TBili  0.4    /        06 May 2022 05:12  DBili  x      /  AST  19     /  ALT  14     /  AlkPhos  32       TPro  5.6    /  Alb  2.9    /  TBili  0.6    /        05 May 2022 08:07  DBili  x      /  AST  23     /  ALT  15     /  AlkPhos  43               pth = 100     RADIOLOGY & ADDITIONAL STUDIES:                         89M hx colon ca s/p resection ~15y ago here with mid-epigastric pain x 2d. Mild nausea, no vomiting/diarrhea/constipation. No fever/chills. No clear precipitating factor. Mild tenderness "but it also hurts when I'm not touching it". Claims he had a BM yesterday. Has not really been eating due to pain. The patient had ventral hernia repair in July 2021.   CT scan today worsening SBO - for OR later today   renal eval for hypercalcemia    today    pt feeling ok  - POD #1  ex lap /SBO repair    hypotensive w decreasing UOP     5/7  pt now in CICU  chart reviewed  Bowel spillage  Hypotensive oliguric yesterday, improving on IVF      PAST MEDICAL & SURGICAL HISTORY:  HTN (hypertension)    BPH (benign prostatic hyperplasia)    Ventral hernia    Colon cancer  h/o chemo 2004    Heart block    Pacemaker    HLD (hyperlipidemia)    H/O colectomy  2004    History of carpal tunnel surgery of left wrist    H/O hernia repair    History of total hip replacement, bilateral    History of appendectomy  ruptured    Cardiac pacemaker    H/O ventral hernia repair        MEDICATIONS  (STANDING):  cefoTEtan  IVPB 1 Gram(s) IV Intermittent every 12 hours  chlorhexidine 4% Liquid 1 Application(s) Topical <User Schedule>  dextrose 5% + sodium chloride 0.45% with potassium chloride 20 mEq/L 1000 milliLiter(s) (75 mL/Hr) IV Continuous <Continuous>  enoxaparin Injectable 30 milliGRAM(s) SubCutaneous every 24 hours  fat emulsion (Fish Oil and Plant Based) 20% Infusion 0.92 Gm/kG/Day (20.9 mL/Hr) IV Continuous <Continuous>  fat emulsion (Fish Oil and Plant Based) 20% Infusion 0.92 Gm/kG/Day (20.83 mL/Hr) IV Continuous <Continuous>  lidocaine 2% Jelly 5 milliLiter(s) IntraUrethral once  Parenteral Nutrition - Adult 1 Each (83 mL/Hr) TPN Continuous <Continuous>  Parenteral Nutrition - Adult 1 Each (83 mL/Hr) TPN Continuous <Continuous>    MEDICATIONS  (PRN):  metoprolol tartrate IVPB 5 milliGRAM(s) IV Intermittent every 6 hours PRN SBP > 150  ondansetron Injectable 4 milliGRAM(s) IV Push every 8 hours PRN Nausea and/or Vomiting  prochlorperazine   Injectable 10 milliGRAM(s) IntraMuscular every 8 hours PRN Nasuea/Vomiting    I&O's Detail    06 May 2022 07:01  -  07 May 2022 07:00  --------------------------------------------------------  IN:  Total IN: 0 mL    OUT:    Indwelling Catheter - Urethral (mL): 580 mL  Total OUT: 580 mL    Total NET: -580 mL              Allergies    amoxicillin (Other (Severe))  penicillin (Unknown)    Intolerances        SOCIAL HISTORY:  Denies ETOh,Smoking,     FAMILY HISTORY:      REVIEW OF SYSTEMS:    CONSTITUTIONAL: No , fevers or chills  EYES/ENT: No visual changes;  No vertigo or throat pain   NECK: No pain or stiffness  RESPIRATORY: No cough, wheezing, hemoptysis; No shortness of breath  CARDIOVASCULAR: No chest pain or palpitations  GASTROINTESTINAL: No abdominal or epigastric pain. No nausea, vomiting, or hematemesis; No diarrhea or constipation. No melena or hematochezia.  GENITOURINARY: No dysuria, frequency or hematuria  NEUROLOGICAL: No numbness or weakness  SKIN: No itching, burning, rashes, or lesions   All other review of systems is negative unless indicated above.    ICU Vital Signs Last 24 Hrs  T(C): 37.1 (07 May 2022 05:00), Max: 37.1 (07 May 2022 05:00)  T(F): 98.8 (07 May 2022 05:00), Max: 98.8 (07 May 2022 05:00)  HR: 107 (07 May 2022 05:00) (52 - 107)  BP: 122/57 (07 May 2022 05:00) (85/45 - 137/101)  BP(mean): 66 (07 May 2022 05:00) (60 - 111)  ABP: --  ABP(mean): --  RR: 21 (07 May 2022 05:00) (17 - 25)  SpO2: 97% (07 May 2022 05:00) (93% - 97%)        PHYSICAL EXAM:    Constitutional: frail  HEENT:  EOMI,  MMM  Neck: No LAD, No JVD  Respiratory: CTAB  Cardiovascular: S1 and S2  Gastrointestinal: distended diminished BS  Extremities: No peripheral edema  Neurological: A/O x 3, no focal deficits  :  De  Skin: No rashes  Access: Not applicable    LABS:                                     11.9   15.62 )-----------( 150      ( 07 May 2022 03:55 )             36.9                                14.6   5.44  )-----------( 208      ( 06 May 2022 05:12 )             44.7                         14.8   2.88  )-----------( 228      ( 05 May 2022 21:45 )             48.0     05-07    146<H>  |  116<H>  |  51<H>  ----------------------------<  91  4.2   |  23  |  2.26<H>    Ca    9.6      07 May 2022 03:55  Phos  2.6     05-07  Mg     2.0     05-07    TPro  4.3<L>  /  Alb  1.9<L>  /  TBili  0.3  /  DBili  x   /  AST  21  /  ALT  15  /  AlkPhos  38<L>  05-07          146    |  115    |  46     ----------------------------<  91        06 May 2022 17:12  4.3     |  24     |  2.33     146    |  113    |  39     ----------------------------<  122       06 May 2022 05:12  3.8     |  22     |  2.33     145    |  111    |  33     ----------------------------<  141       05 May 2022 21:45  3.4     |  29     |  1.79     Ca    9.4        06 May 2022 17:12  Ca    9.6        06 May 2022 05:12    Phos  2.5       06 May 2022 05:12  Phos  2.0       05 May 2022 08:07    Mg     1.6       06 May 2022 05:12  Mg     2.1       05 May 2022 08:07    TPro  4.5    /  Alb  2.3    /  TBili  0.4    /        06 May 2022 05:12  DBili  x      /  AST  19     /  ALT  14     /  AlkPhos  32       TPro  5.6    /  Alb  2.9    /  TBili  0.6    /        05 May 2022 08:07  DBili  x      /  AST  23     /  ALT  15     /  AlkPhos  43               pth = 100     RADIOLOGY & ADDITIONAL STUDIES:

## 2022-05-07 NOTE — PROGRESS NOTE ADULT - ASSESSMENT
MEDICATIONS  (STANDING):  cefoTEtan  IVPB 1 Gram(s) IV Intermittent every 12 hours  chlorhexidine 4% Liquid 1 Application(s) Topical <User Schedule>  dextrose 5% + sodium chloride 0.45% with potassium chloride 20 mEq/L 1000 milliLiter(s) (75 mL/Hr) IV Continuous <Continuous>  enoxaparin Injectable 30 milliGRAM(s) SubCutaneous every 24 hours  fat emulsion (Fish Oil and Plant Based) 20% Infusion 0.92 Gm/kG/Day (20.9 mL/Hr) IV Continuous <Continuous>  fat emulsion (Fish Oil and Plant Based) 20% Infusion 0.92 Gm/kG/Day (20.83 mL/Hr) IV Continuous <Continuous>  lidocaine 2% Jelly 5 milliLiter(s) IntraUrethral once  Parenteral Nutrition - Adult 1 Each (83 mL/Hr) TPN Continuous <Continuous>  Parenteral Nutrition - Adult 1 Each (83 mL/Hr) TPN Continuous <Continuous>    MEDICATIONS  (PRN):  metoprolol tartrate IVPB 5 milliGRAM(s) IV Intermittent every 6 hours PRN SBP > 150  ondansetron Injectable 4 milliGRAM(s) IV Push every 8 hours PRN Nausea and/or Vomiting  prochlorperazine   Injectable 10 milliGRAM(s) IntraMuscular every 8 hours PRN Nasuea/Vomiting      89M hx colon ca s/p resection in 2004 here with severe abdominal  pain. Symptoms started 2days ago and got worse.  Mild nausea, no vomiting/diarrhea/constipation. No fever/chills. No clear precipitating factor. Mild tenderness "but it also hurts when I'm not touching it". Claims he had a BM yesterday. Has not really been eating due to pain. The patient had ventral hernia repair in July 2021. CT abd shows high grade distal SBO.   assessment Dx:          SBO  YAMINI on CKD III due to nausea/vomiting and decreased PO intake POA and then Resolved then subsequent recurrence with post op YAMINI likely due to hypovolemia/hypotension  Leukocytosis not present on admission. likely post op/reactive  Anemia likely hemodilutional + blood loss related to surgery. More likely hemodilutional.   Hypercalcmemia (multifactorial: dehydration + primary hyperparathyroidism)  HTN  BPH  SSS s/p PPM    -admitted to surgery  -NGT failure and subsequent possible patient removal overnight 5/2-5/3  -Management as per surgery. Attempting conservative management at this time->No improvement as of yet with repeat CT showing worsening SBO findings +ascites  -NPO. Advance diet as per surgery  -Nutrition consult. Likely to have to start PPN vs TPN tomorrow. Nutritonist consult. Morning labs.   -PO meds to IV where possible  -Continue to monitor Cr and electrolytes. Replace electrolytes accordingly  -PT elevated + CKD + Mild hypercalcemia. Hypercalcemia likely multifactorial. From dehydration from decreased PO intake/nausea/vomiting + Primary Hyperparathyroid  -Cardiology consult  -At this point surgery has more benefit than risk. No improvement with coservative management. Expected decline without intervention. Mild hypercalcemia stable and Cardiac wise stable. Patient is moderate risk for perioperative and post operative complications however without intervention there is a much higher risk of decline. Patient currently medically optimized for surgery. Explained risks and benefits in detail and patient and family verbalized understanding.   -5/6: Post op yamini and hypotension. On PPN. IVF as needed for fluid balance. I/Os (mullen in place). Nephro on board. Caution with pain control with hypotentions.   -5/7: Improved urine output, cr. Pain controlled. Leukocytosis with bandeemia (afebrile and BP improved). On ABX. Continue ABX as per surgery. Blood cultures ordered. Continue to monitor closely.     DVT Prophylaxis: Lovenox subq  Discussed with pt, daughter, SW/SHABBIR, RN, surgery, nephrology

## 2022-05-07 NOTE — PROGRESS NOTE ADULT - SUBJECTIVE AND OBJECTIVE BOX
CC:Patient is a 89y old  Male who presents with a chief complaint of abdominal pain       HPI:  89M hx colon ca s/p resection ~15y ago here with mid-epigastric pain x 2d. Mild nausea, no vomiting/diarrhea/constipation. No fever/chills. No clear precipitating factor. Mild tenderness "but it also hurts when I'm not touching it". Claims he had a BM yesterday. Has not really been eating due to pain. The patient had ventral hernia repair in 2021.     Subjective:   : ABdominal Pain improved. No nausea or vomiting. Passing a little flatus., Denies fever, chills, dizziness, chest pain, SOB  /3: Abdominal pain similar to yesterady. NGT failure/removal overnight. No nausea. Passing a little flatus. Still NPO and on IVF.   : Abdominal pain unchanged; Off NGT; Repeat CT ->worsening SBO and ascites. Afebrile and hemodynamically stable.   : more distended; more pain and more nausea. Planned for ex lap today.   : s/p ex lap. Post op hypotension and rise in Cr. IVF boluses ordered to maintain MAP. Transfererd to CICU for closer monitoring.   : improved BP. Improved Cr. Improved Urine output. Pain controlled. No nausea. Intermittent confusion but easily re-oriented.     Review of Systems: 14 Point review of systems reviewed and reported as negative unless otherwise stated above      PMH: SSS s/p PPM, HLD, HTN, BPH, Colon CA.  PSH: Colon CA s/p  hemicolectomy , appendecotmy, bilteral PREM, , appendectomy    Meds: per reconciliation sheet, noted below  MEDICATIONS  (STANDING):  enoxaparin Injectable 30 milliGRAM(s) SubCutaneous every 24 hours  sodium chloride 0.9%. 1000 milliLiter(s) (125 mL/Hr) IV Continuous <Continuous>    MEDICATIONS  (PRN):  HYDROmorphone  Injectable 0.5 milliGRAM(s) IV Push every 6 hours PRN Severe Pain (7 - 10)  metoprolol tartrate Injectable 5 milliGRAM(s) IV Push every 6 hours PRN SBP greater than 150mmhg  ondansetron Injectable 4 milliGRAM(s) IV Push every 8 hours PRN Nausea and/or Vomiting    Allergies    amoxicillin (Other (Severe))  penicillin (Unknown)    Intolerances      Social: no smoking, no alcohol, no illegal drugs; no recent travel, no exposure to TB  FAMILY HISTORY: Father had CAD/ MI, mother  at  old age    Review of Systems: 14 Point review of systems reviewed and reported as negative unless otherwise stated in HPI    T(C): 36.6 (22 @ 22:32), Max: 36.6 (22 @ 16:37)  HR: 57 (22 @ 09:53) (57 - 100)  BP: 105/50 (22 @ 09:53) (93/45 - 162/92)  RR: 18 (22 @ 07:44) (16 - 20)  SpO2: 94% (22 @ 07:44) (94% - 100%)  PE:    Constitutional: alert communicating, lethargic  HEENT:  EOMI, PERRLA; Dry mucous membranes  Neck: supple  Back: no tenderness  Respiratory: clear  Cardiovascular: S1S2 regular, no murmurs  Abdomen: appropriately tenders soft; ; hypoactive bowel sounds; Midline surgical inscision site with soft surgical dressing; clean and dry  : Kc with clear urine (low volume)  Musculoskeletal: no muscle tenderness, no joint swelling or tenderness  Extremities: no pedal edema  Neurological: AxOx2, moving all extremities, no focal deficits  Skin:  As per Abdominal exam    Labs:                                11.9   15.62 )-----------( 150      ( 07 May 2022 03:55 )             36.9     05-    146<H>  |  116<H>  |  51<H>  ----------------------------<  91  4.2   |  23  |  2.26<H>    Ca    9.6      07 May 2022 03:55  Phos  2.6     05-  Mg     2.0     -    TPro  4.3<L>  /  Alb  1.9<L>  /  TBili  0.3  /  DBili  x   /  AST  21  /  ALT  15  /  AlkPhos  38<L>  05-07    COVID-19 PCR: NotDetec (01 May 2022 09:27)    CAPILLARY BLOOD GLUCOSE                                    14.6   5.44  )-----------( 208      ( 06 May 2022 05:12 )             44.7     05-06    146<H>  |  113<H>  |  39<H>  ----------------------------<  122<H>  3.8   |  22  |  2.33<H>    Ca    9.6      06 May 2022 05:12  Phos  2.5     05-06  Mg     1.6     05-06    TPro  4.5<L>  /  Alb  2.3<L>  /  TBili  0.4  /  DBili  x   /  AST  19  /  ALT  14  /  AlkPhos  32<L>      COVID-19 PCR: NotDetec (01 May 2022 09:27)    CAPILLARY BLOOD GLUCOSE    Triglycerides, Serum: 98 mg/dL (22 @ 05:12) Vitamin D, 25-Hydroxy: 36.6:Parathyroid Hormone Intact, Serum (22 @ 11:45)   Calcium.: 10.8:                                   13.2   4.55  )-----------( 189      ( 05 May 2022 08:07 )             40.9     05-05    146<H>  |  112<H>  |  35<H>  ----------------------------<  134<H>  3.6   |  25  |  1.25    Ca    10.4<H>      05 May 2022 08:07  Phos  2.0     05-05  Mg     2.1     05-05    TPro  5.6<L>  /  Alb  2.9<L>  /  TBili  0.6  /  DBili  x   /  AST  23  /  ALT  15  /  AlkPhos  43  05-05    COVID-19 PCR: NotDetec (01 May 2022 09:27)    CAPILLARY BLOOD GLUCOSE          Radiology: CT abd : Right hemicolectomy.High-grade distal small bowel obstruction. Other findings as discussed above.      < from: CT Abdomen and Pelvis w/ Oral Cont (22 @ 14:53) >    IMPRESSION:    Worsening small bowel obstruction with slight increase in dilatation.   Increasing ascites.    Developing small bilateral pleural effusions.    < end of copied text >    I personally reviewed labs, imaging, orders and vitals.   Discussed with RN, Patient, Family, Surgery, Nephro

## 2022-05-07 NOTE — PROGRESS NOTE ADULT - ASSESSMENT
S/p SBR for high grade SBO. Urine output improved with IV fluid resusitation. Cont PPN. NPO. IV abx. Elevated WBC, likely secondary to spillage at time of surgery. Check CXR. F/U labs and urine output.

## 2022-05-07 NOTE — CHART NOTE - NSCHARTNOTEFT_GEN_A_CORE
Clinical Nutrition PPN Follow Up Note    *89 yo male admitted with high grad distal SBO, YAMINI on CKD now s/p ex lap on 5/5.  hypercalcemia 2/2 dehydration and primary hyperparathyroidism. Pt started on PPN on 5/5 2/2 SBO and extended time NPO.    *current status: As per surgery on 5/6: "s/p Expl lap, SBR for high grade SBO. Pt likely volume depleted with low urine output, elevated CR. Will bolus IVF to treat low UO."  Nephrology is following. Pt transferred to CICU for closer monitoring.   NGT pulled out on 5/5, pt refused replacement.     *labs reviewed; c/w hypernatremia - receiving IVF and has low UO, may benefit from increase in PPN volume but I&O's not accurately documented so will monitor and adjust based on tomorrow's I&O's;   phos at lower end normal, will increase in PN bag.  Mg and K WNL.   Corrected Ca elevated, do not replete outside of bag (CAPS is out of PN solution).  bilirubin total WNL.  triglycerides WNL for lipids.  no POCT's documented, obtain POCT q6hrs and maintain blood glucose within 140-180mg/dL.    05-07    146<H>  |  116<H>  |  51<H>  ----------------------------<  91  4.2   |  23  |  2.26<H>    Ca    9.6      07 May 2022 03:55  Phos  2.6     05-07  Mg     2.0     05-07    TPro  4.3<L>  /  Alb  1.9<L>  /  TBili  0.3  /  DBili  x   /  AST  21  /  ALT  15  /  AlkPhos  38<L>  05-07      Lipid Panel: Date/Time: 05-05-22 @ 08:07  Cholesterol, Serum: 169  HDL Cholesterol, Serum: 63  Triglycerides, Serum: 98 (5/6)    *pertinent meds: Dextrose 5% + NaCl 0.45% w/ KCl 20 mEq IV, metoprolol, Zofran, prochlorperazine     *I&O's Detail    06 May 2022 07:01  -  07 May 2022 07:00  --------------------------------------------------------  IN:  Total IN: 0 mL    OUT:    Indwelling Catheter - Urethral (mL): 580 mL  Total OUT: 580 mL    Total NET: -580 mL  * fluid status: negative except PPN not documented. rec'd strict I/O's while on PPN to monitor fluid balance especially in lieu of hypernatremia and low UO.     *NO BM.  pt not on bowel regimen.    *Mahesh Score of 15; stg 1 PI on sacrum documented. (1+) L arm edema documented.    *Malnutrition: Pt continues to meet criteria for severe malnutrition in acute on chronic illness r/t decreased ability to consume sufficient nutr 2/2 SBO on CA AEB meeting <50% of ENN x 7 days, severe muscle/ fat wasting    Estimated Needs: based on 55Kg (wt from 5/5, bedscale wt obtained by RD)  Calories: 1734-1269 Kcal (30-35 Kcal/Kg)  Protein:   g protein (1.5-2g/Kg)  Fluids:  5081-3017 mL (30-35mL/Kg)    Diet, NPO (05-05-22 @ 19:40) [Active]    Weight History:  55Kg (5/5)  Height (cm): 175.3 (05-01-22 @ 08:41)  Weight (kg): 54.4 (05-01-22 @ 08:41)  BMI (kg/m2): 17.7 (05-01-22 @ 08:41)  BSA (m2): 1.66 (05-01-22 @ 08:41)    PPN Recommendations: via peripheral line  Total Volume: 2000 mL  80 g  Amino Acids  100 g Dextrose  50 g Lipids 20%  105 mEq Sodium Chloride  0 mEq Sodium Acetate  15 mmol Sodium Phosphate  24 mEq Potassium Chloride  24 mEq Potassium Acetate  15 mmol Potassium Phosphate  0 mEq Calcium Gluconate  10 mEq Magnesium Sulfate  100 mg Thiamine  1 ml Trace Elements  10 ml MVI    Total Calories   1160Kcal    (Meets  70%  of  Estimated Energy needs  and 96 %  Protein needs)     (osmolarity ~865)    Additional Recommendations:    1) Daily weights  2) Strict I & O's  3) Daily lyte checks including magnesium and phos  4) Weekly triglycerides/LFT checks  5) POCT q6hrs; maintain 140-180mg/dL    *will monitor and adjust treatement plan prn*  Deana Glynn MS, RDN, [phone]

## 2022-05-07 NOTE — PROGRESS NOTE ADULT - SUBJECTIVE AND OBJECTIVE BOX
Resting comfortably  VSS afeb    lungs- clear  Cor- S1S2  Abd- few BS soft tender R greater than left, no guarding or rebound  Ext- no edema

## 2022-05-08 NOTE — CONSULT NOTE ADULT - SUBJECTIVE AND OBJECTIVE BOX
Patient is a 89y old  Male who presents with a chief complaint of abdominal pain    HPI:  89 y/o Male with h/o colon Ca s/p resection ~15y ago, SSS ss/p PM, HTN, BPH, b/l THR was admitted on  for worsening mid-epigastric pain x 2d. Mild nausea, no vomiting/diarrhea/constipation. No fever/chills at home. No clear precipitating factor. On  he underwent an exploratory laparotomy and small bowel resection. He was given cefotetan IV. Postoperative, he became hypotensive, but improved with IV fluids. Alert, but confused. He remained on cefotetan.    PMH: SSS s/p PPM, HLD, HTN, BPH, Colon CA.  PSH: Colon CA s/p  hemicolectomy , appendectomy, bilteral THR, appendectomy  Meds: per reconciliation sheet, noted below  MEDICATIONS  (STANDING):  cefoTEtan  IVPB 1 Gram(s) IV Intermittent every 12 hours  chlorhexidine 4% Liquid 1 Application(s) Topical <User Schedule>  dextrose 5% + sodium chloride 0.45% with potassium chloride 20 mEq/L 1000 milliLiter(s) (75 mL/Hr) IV Continuous <Continuous>  enoxaparin Injectable 30 milliGRAM(s) SubCutaneous every 24 hours  fat emulsion (Fish Oil and Plant Based) 20% Infusion 0.92 Gm/kG/Day (20.9 mL/Hr) IV Continuous <Continuous>  fat emulsion (Fish Oil and Plant Based) 20% Infusion 0.92 Gm/kG/Day (20.83 mL/Hr) IV Continuous <Continuous>  lidocaine 2% Jelly 5 milliLiter(s) IntraUrethral once  metoprolol tartrate Injectable 5 milliGRAM(s) IV Push every 6 hours  Parenteral Nutrition - Adult 1 Each (83 mL/Hr) TPN Continuous <Continuous>  Parenteral Nutrition - Adult 1 Each (83 mL/Hr) TPN Continuous <Continuous>    MEDICATIONS  (PRN):  ondansetron Injectable 4 milliGRAM(s) IV Push every 8 hours PRN Nausea and/or Vomiting  prochlorperazine   Injectable 10 milliGRAM(s) IntraMuscular every 8 hours PRN Nasuea/Vomiting    Allergies    amoxicillin (Other (Severe))  penicillin (Unknown)    Intolerances      Social: no smoking, no alcohol, no illegal drugs; no recent travel, no exposure to TB  FAMILY HISTORY:    no history of premature cardiovascular disease in first degree relatives    ROS: the patient denies fever, no chills, no HA, no seizures, no dizziness, no sore throat, no nasal congestion, no blurry vision, no CP, no palpitations, no SOB, no cough, no abdominal pain, no diarrhea, no N/V, no dysuria, no leg pain, no claudication, no rash, no joint aches, no rectal pain or bleeding, no night sweats  All other systems reviewed and are negative    Vital Signs Last 24 Hrs  T(C): 36.2 (08 May 2022 06:12), Max: 36.8 (07 May 2022 14:00)  T(F): 97.2 (08 May 2022 06:12), Max: 98.2 (07 May 2022 14:00)  HR: 91 (08 May 2022 11:00) (80 - 106)  BP: 115/75 (08 May 2022 09:00) (104/76 - 150/79)  BP(mean): 83 (08 May 2022 09:00) (74 - 98)  RR: 22 (08 May 2022 11:00) (15 - 24)  SpO2: 94% (08 May 2022 11:00) (94% - 97%)  Daily     Daily Weight in k.2 (08 May 2022 06:12)    PE:    Constitutional:  No acute distress  HEENT: NC/AT, EOMI, PERRLA, conjunctivae clear; ears and nose atraumatic; pharynx benign  Neck: supple; thyroid not palpable  Back: no tenderness  Respiratory: respiratory effort normal; crackles at bases  Cardiovascular: S1S2 regular, no murmurs  Abdomen: soft, not tender, not distended, positive BS; no liver or spleen organomegaly  Genitourinary: no suprapubic tenderness  Lymphatic: no LN palpable  Musculoskeletal: no muscle tenderness, no joint swelling or tenderness  Extremities: no pedal edema  Neurological/ Psychiatric: Alert, judgement and insight impaired; moving all extremities  Skin: no rashes; no palpable lesions    Labs: all available labs reviewed                         )-----------( 116      ( 08 May 2022 05:46 )             37.2     05-08    146<H>  |  115<H>  |  50<H>  ----------------------------<  103<H>  4.0   |  22  |  1.75<H>    Ca    10.7<H>      08 May 2022 05:46  Phos  2.7     05-  Mg     2.1     -    TPro  5.2<L>  /  Alb  1.8<L>  /  TBili  0.3  /  DBili  x   /  AST  21  /  ALT  15  /  AlkPhos  69  05-08     LIVER FUNCTIONS - ( 08 May 2022 05:46 )  Alb: 1.8 g/dL / Pro: 5.2 gm/dL / ALK PHOS: 69 U/L / ALT: 15 U/L / AST: 21 U/L / GGT: x           Radiology: all available radiological tests reviewed    < from: CT Abdomen and Pelvis w/ Oral Cont (22 @ 14:53) >  Worsening small bowel obstruction with slight increase in dilatation.   Increasing ascites.    Developing small bilateral pleural effusions.    < end of copied text >      Advanced directives addressed: full resuscitation

## 2022-05-08 NOTE — PROGRESS NOTE ADULT - SUBJECTIVE AND OBJECTIVE BOX
Awake, comfortable, no distress, confused at times    VSS afeb    lungs- clear  Cor- RRR  Abd- + BS soft non tender  Ext- no edema

## 2022-05-08 NOTE — PROGRESS NOTE ADULT - SUBJECTIVE AND OBJECTIVE BOX
CHIEF COMPLAINT: Patient is a 89y old  Male who presents with a chief complaint of abd pain    HPI:  89M hx colon ca s/p resection ~15y ago here with mid-epigastric pain x 2d. Mild nausea, no vomiting/diarrhea/constipation. No fever/chills. No clear precipitating factor. Mild tenderness "but it also hurts when I'm not touching it". Claims he had a BM yesterday. Has not really been eating due to pain. (01 May 2022 13:06)    5/2-patient known to me; in for SBO; during w/u , placed on telemetry due to need for IV beta blockers to control BP.  Patient admitted for bowel rest and decompression and consideration for surgery if no improvement in symptoms.  Cardiac issues are complete heart block with PPM, no active CAD and recent stress testing with no ischemia.  EKG with A sensing, V pacing.   CXR with left lower lung infiltrate.    5/8: on 5/6 patient was s/p successful ex lap but was complicated by post op hypotension causing ATN/YAMINI and transferred to CICU for closer monitoring. Yesterday, while son was in the room he noted HRs were going down into the 30s and was very concerning and voiced these concerns to the hospitalist who got intouch with me. It appears these events were likely the HR reads from his pulse ox when he has intermittent PVCs vs poor wave form because reviewed entire tele and no HRs going down to the 30s, he does have some irregularity with PPM pacing in a a sensed v paced fashion. Patient has been confused, and remains mildly confused but without any current complaints.      PMHx: PAST MEDICAL & SURGICAL HISTORY:  HTN (hypertension)    BPH (benign prostatic hyperplasia)    Ventral hernia    Colon cancer  h/o chemo 2004    Heart block    Pacemaker    HLD (hyperlipidemia)    H/O colectomy  2004    History of carpal tunnel surgery of left wrist    H/O hernia repair    History of total hip replacement, bilateral    History of appendectomy  ruptured    Cardiac pacemaker    H/O ventral hernia repair          Soc Hx:       Allergies: Allergies    amoxicillin (Other (Severe))  penicillin (Unknown)    Intolerances          REVIEW OF SYSTEMS:  10 point ROS was obtained and indicated above otherwise negative    Vital Signs Last 24 Hrs  T(C): 36.2 (08 May 2022 06:12), Max: 36.8 (07 May 2022 14:00)  T(F): 97.2 (08 May 2022 06:12), Max: 98.2 (07 May 2022 14:00)  HR: 106 (08 May 2022 08:00) (80 - 107)  BP: 135/80 (08 May 2022 08:00) (104/76 - 150/79)  BP(mean): 93 (08 May 2022 08:00) (63 - 98)  RR: 21 (08 May 2022 08:00) (16 - 26)  SpO2: 97% (08 May 2022 08:00) (93% - 97%)    I&O's Summary    07 May 2022 07:01  -  08 May 2022 07:00  --------------------------------------------------------  IN: 0 mL / OUT: 1300 mL / NET: -1300 mL      PHYSICAL EXAM:   Constitutional: NAD, laying flat comfortably  HEENT: dry MM  Neck: No JVD  Respiratory: Breath sounds are rhonchi  Cardiovascular: soft systolic murmur at LSB  Gastrointestinal: Bowel Sounds present, soft, nontender, nondistended, no guarding, no rebound  Extremities: RUE swelling    MEDICATIONS  (STANDING):  cefoTEtan  IVPB 1 Gram(s) IV Intermittent every 12 hours  chlorhexidine 4% Liquid 1 Application(s) Topical <User Schedule>  dextrose 5% + sodium chloride 0.45% with potassium chloride 20 mEq/L 1000 milliLiter(s) (75 mL/Hr) IV Continuous <Continuous>  enoxaparin Injectable 30 milliGRAM(s) SubCutaneous every 24 hours  fat emulsion (Fish Oil and Plant Based) 20% Infusion 0.92 Gm/kG/Day (20.9 mL/Hr) IV Continuous <Continuous>  fat emulsion (Fish Oil and Plant Based) 20% Infusion 0.92 Gm/kG/Day (20.83 mL/Hr) IV Continuous <Continuous>  lidocaine 2% Jelly 5 milliLiter(s) IntraUrethral once  Parenteral Nutrition - Adult 1 Each (83 mL/Hr) TPN Continuous <Continuous>  Parenteral Nutrition - Adult 1 Each (83 mL/Hr) TPN Continuous <Continuous>    MEDICATIONS  (PRN):  metoprolol tartrate IVPB 5 milliGRAM(s) IV Intermittent every 6 hours PRN SBP > 150  ondansetron Injectable 4 milliGRAM(s) IV Push every 8 hours PRN Nausea and/or Vomiting  prochlorperazine   Injectable 10 milliGRAM(s) IntraMuscular every 8 hours PRN Nasuea/Vomiting    LABS: All Labs Reviewed:                         11.9   14.06 )-----------( 116      ( 08 May 2022 05:46 )             37.2                        12.2   9.44  )-----------( 185      ( 02 May 2022 07:10 )             37.9     05-08    146<H>  |  115<H>  |  50<H>  ----------------------------<  103<H>  4.0   |  22  |  1.75<H>    Ca    10.7<H>      08 May 2022 05:46  Phos  2.7     05-08  Mg     2.1     05-08    TPro  5.2<L>  /  Alb  1.8<L>  /  TBili  0.3  /  DBili  x   /  AST  21  /  ALT  15  /  AlkPhos  69  05-08    05-01    136  |  100  |  31<H>  ----------------------------<  144<H>  3.9   |  31  |  1.71<H>    Ca    11.4<H>      01 May 2022 09:27    TPro  7.4  /  Alb  4.1  /  TBili  0.8  /  DBili  x   /  AST  23  /  ALT  22  /  AlkPhos  71  05-01    PT/INR - ( 01 May 2022 09:27 )   PT: 10.9 sec;   INR: 0.94 ratio         PTT - ( 01 May 2022 09:27 )  PTT:44.3 sec      RADIOLOGY:    EKG:  A sensing, V pacing    Telemetry:  Reviewed, see above

## 2022-05-08 NOTE — SWALLOW BEDSIDE ASSESSMENT ADULT - SWALLOW EVAL: DIAGNOSIS
oral-pharyngeal swallow skills within age-appropriate limits for regular consistency diet when GI status allows.

## 2022-05-08 NOTE — SWALLOW BEDSIDE ASSESSMENT ADULT - COMMENTS
2 y/o Male with h/o colon Ca s/p resection ~15y ago, SSS ss/p PM, HTN, BPH, b/l THR was admitted on 5/7 for worsening mid-epigastric pain x 2d. Mild nausea, no vomiting/diarrhea/constipation. No fever/chills at home. No clear precipitating factor. On 5/5 he underwent an exploratory laparotomy and small bowel resection. He was given cefotetan IV. Postoperative, he became hypotensive, but improved with IV fluids. Alert, but confused. He remained on cefotetan.    Pt is seen by Service for evaluation of oral-pharyngeal swallow skills for po intake.

## 2022-05-08 NOTE — CHART NOTE - NSCHARTNOTEFT_GEN_A_CORE
Clinical Nutrition PPN Follow Up Note    *89 yo male admitted with high grad distal SBO, YAMINI on CKD now s/p ex lap on 5/5.  hypercalcemia 2/2 dehydration and primary hyperparathyroidism. Pt started on PPN on 5/5 2/2 SBO and extended time NPO.    *current status: As per surgery on 5/6: "s/p Expl lap, SBR for high grade SBO. Pt likely volume depleted with low urine output, elevated CR. Will bolus IVF to treat low UO."  Nephrology is following. Pt transferred to CICU for closer monitoring.   NGT pulled out on 5/5, pt refused replacement. Urine output improving.    *labs reviewed; c/w slight hypernatremia - receiving IVF and has low UO - but improving, may benefit from increase in PPN volume but I&O's not accurately documented so will monitor closely;   phos at lower end normal, will increase in PN bag.  Mg and K WNL.   Corrected Ca elevated, do not replete outside of bag (CAPS is out of PN solution).  bilirubin total WNL.  triglycerides WNL for lipids.  no POCT's documented, obtain POCT q6hrs and maintain blood glucose within 140-180mg/dL.    05-08    146<H>  |  115<H>  |  50<H>  ----------------------------<  103<H>  4.0   |  22  |  1.75<H>    Ca    10.7<H>      08 May 2022 05:46  Phos  2.7     05-08  Mg     2.1     05-08    TPro  5.2<L>  /  Alb  1.8<L>  /  TBili  0.3  /  DBili  x   /  AST  21  /  ALT  15  /  AlkPhos  69  05-08      Lipid Panel: Date/Time: 05-05-22 @ 08:07  Cholesterol, Serum: 169  HDL Cholesterol, Serum: 63  Triglycerides, Serum: 98 (5/6)    Vitamin D, 25-Hydroxy: 36.6 ng/mL (05-04-22 @ 11:45) - WNL      *pertinent meds: Dextrose 5% + NaCl 0.45% w/ KCl 20 mEq IV, metoprolol, Zofran, prochlorperazine     *I&O's Detail    07 May 2022 07:01  -  08 May 2022 07:00  --------------------------------------------------------  IN:  Total IN: 0 mL    OUT:    Indwelling Catheter - Urethral (mL): 1300 mL  Total OUT: 1300 mL    Total NET: -1300 mL  * fluid status: appears negative except no input documented. rec'd strict I/O's while on PPN to monitor fluid balance especially in lieu of hypernatremia and low UO.     *NO BM doc'd.  pt not on bowel regimen.    *Mahesh Score of 12; stg 1 PI on sacrum documented. (1+) L arm edema documented.    *Malnutrition: Pt continues to meet criteria for severe malnutrition in acute on chronic illness r/t decreased ability to consume sufficient nutr 2/2 SBO on CA AEB meeting <50% of ENN x 7 days, severe muscle/ fat wasting    Estimated Needs: based on 55Kg (wt from 5/5, bedscale wt obtained by RD)  Calories: 7433-4503 Kcal (30-35 Kcal/Kg)  Protein:   g protein (1.5-2g/Kg)  Fluids:  2042-5157 mL (30-35mL/Kg)    Diet, NPO (05-05-22 @ 19:40) [Active]    Weight History:  55Kg (5/5)  Height (cm): 175.3 (05-01-22 @ 08:41)  Weight (kg): 54.4 (05-01-22 @ 08:41)  BMI (kg/m2): 17.7 (05-01-22 @ 08:41)  BSA (m2): 1.66 (05-01-22 @ 08:41)    PPN Recommendations: via peripheral line  Total Volume: 2000 mL  85 g  Amino Acids  100 g Dextrose  50 g Lipids 20%  98 mEq Sodium Chloride  0 mEq Sodium Acetate  20 mmol Sodium Phosphate  24 mEq Potassium Chloride  24 mEq Potassium Acetate  15 mmol Potassium Phosphate  0 mEq Calcium Gluconate  10 mEq Magnesium Sulfate  100 mg Thiamine  1 ml Trace Elements  10 ml MVI    Total Calories   1180 Kcal    (Meets  71%  of  Estimated Energy needs  and  100% LOW-END Protein needs)     (osmolarity ~889)    Additional Recommendations:  1) Daily weights  2) Strict I & O's  3) Daily lyte checks including magnesium and phos  4) Weekly triglycerides/LFT checks  5) POCT q6hrs; maintain 140-180mg/dL  6) Monitor BM's - consider implementing bowel regimen    *will monitor and adjust treatement plan prn*  Deana Glynn MS, RDN, [phone]

## 2022-05-08 NOTE — PROGRESS NOTE ADULT - ASSESSMENT
89 yo M h/o SSS s/p PPM, HTN and colon CA who presented with abd pain found to have SBO s/o ex lap. Course complicated by hypotension and ATN/YAMINI, now improving with resolution of hypotension.    -Family was concerned about "bradycardia" but likely was seeing the pulse ox HR which is not alwasy picking up correctly or counting if he has a PVC come in. Spoke with Wife, Ana and reassured her and answered all of her questions.  -He remains stable from a cardiac standpoint with improving renal function and stable BP. Can consider D/Cing tele so he can get to a quieter floor which may help with his confusion.  -Now that he is stable would recommend starting him back on his BB. I will start back on his home Toprol 25mg Qday.  -Continue to hold his other antihypertensives for now and monitor BP. Holding HCTZ and losartan also due to his YAMINI.     89 yo M h/o SSS s/p PPM, HTN and colon CA who presented with abd pain found to have SBO s/o ex lap. Course complicated by hypotension and ATN/YAMINI, now improving with resolution of hypotension.    -Family was concerned about "bradycardia" but likely was seeing the pulse ox HR which is not alwasy picking up correctly or counting if he has a PVC come in. Spoke with Wife, Ana and reassured her and answered all of her questions.  -He remains stable from a cardiac standpoint with improving renal function and stable BP. Can consider D/Cing tele so he can get to a quieter floor which may help with his confusion.  -Patient remains NPO. Once able to take PO recommend placing him back on his home PO BB. Instead will schedule his metoprolol IV rather than keeping as PRN to prevent BB withdrawal/tachycardia.

## 2022-05-08 NOTE — SWALLOW BEDSIDE ASSESSMENT ADULT - NS SPL SWALLOW CLINIC TRIAL FT
Pt shows no overt contraindication for regular consistency diet: Upgrade to regular when GI allows.  meds as tolerated.  Will follow peripherally for continued tolerance.

## 2022-05-08 NOTE — SWALLOW BEDSIDE ASSESSMENT ADULT - SLP GENERAL OBSERVATIONS
seated upright in bed, awake and alert. not verbally communicative at present, but able to follow directions

## 2022-05-08 NOTE — PROGRESS NOTE ADULT - SUBJECTIVE AND OBJECTIVE BOX
89M hx colon ca s/p resection ~15y ago here with mid-epigastric pain x 2d. Mild nausea, no vomiting/diarrhea/constipation. No fever/chills. No clear precipitating factor. Mild tenderness "but it also hurts when I'm not touching it". Claims he had a BM yesterday. Has not really been eating due to pain. The patient had ventral hernia repair in July 2021.   CT scan today worsening SBO - for OR later today   renal eval for hypercalcemia    today    pt feeling ok  - POD #1  ex lap /SBO repair    hypotensive w decreasing UOP     5/7  pt now in CICU  chart reviewed  Bowel spillage  Hypotensive oliguric yesterday, improving on IVF    5/8  More awake alert  family at bedside   to start clears PO     PAST MEDICAL & SURGICAL HISTORY:  HTN (hypertension)    BPH (benign prostatic hyperplasia)    Ventral hernia    Colon cancer  h/o chemo 2004    Heart block    Pacemaker    HLD (hyperlipidemia)    H/O colectomy  2004    History of carpal tunnel surgery of left wrist    H/O hernia repair    History of total hip replacement, bilateral    History of appendectomy  ruptured    Cardiac pacemaker    H/O ventral hernia repair      MEDICATIONS  (STANDING):  cefoTEtan  IVPB 1 Gram(s) IV Intermittent every 12 hours  chlorhexidine 4% Liquid 1 Application(s) Topical <User Schedule>  dextrose 5% + sodium chloride 0.45% with potassium chloride 20 mEq/L 1000 milliLiter(s) (75 mL/Hr) IV Continuous <Continuous>  enoxaparin Injectable 30 milliGRAM(s) SubCutaneous every 24 hours  fat emulsion (Fish Oil and Plant Based) 20% Infusion 0.92 Gm/kG/Day (20.83 mL/Hr) IV Continuous <Continuous>  fat emulsion (Fish Oil and Plant Based) 20% Infusion 0.92 Gm/kG/Day (20.9 mL/Hr) IV Continuous <Continuous>  lidocaine 2% Jelly 5 milliLiter(s) IntraUrethral once  metoprolol tartrate Injectable 5 milliGRAM(s) IV Push every 6 hours  Parenteral Nutrition - Adult 1 Each (83 mL/Hr) TPN Continuous <Continuous>  Parenteral Nutrition - Adult 1 Each (83 mL/Hr) TPN Continuous <Continuous>    MEDICATIONS  (PRN):  ondansetron Injectable 4 milliGRAM(s) IV Push every 8 hours PRN Nausea and/or Vomiting  prochlorperazine   Injectable 10 milliGRAM(s) IntraMuscular every 8 hours PRN Nasuea/Vomiting      I&O's Detail    07 May 2022 07:01  -  08 May 2022 07:00  --------------------------------------------------------  IN:  Total IN: 0 mL    OUT:    Indwelling Catheter - Urethral (mL): 1300 mL  Total OUT: 1300 mL    Total NET: -1300 mL                  Allergies    amoxicillin (Other (Severe))  penicillin (Unknown)    Intolerances        SOCIAL HISTORY:  Denies ETOh,Smoking,     FAMILY HISTORY:      REVIEW OF SYSTEMS:      Vital Signs Last 24 Hrs  T(C): 36.2 (08 May 2022 06:12), Max: 36.8 (07 May 2022 14:00)  T(F): 97.2 (08 May 2022 06:12), Max: 98.2 (07 May 2022 14:00)  HR: 91 (08 May 2022 11:00) (80 - 106)  BP: 115/75 (08 May 2022 09:00) (104/76 - 150/79)  BP(mean): 83 (08 May 2022 09:00) (74 - 98)  RR: 22 (08 May 2022 11:00) (15 - 24)  SpO2: 94% (08 May 2022 11:00) (94% - 97%)      PHYSICAL EXAM:    Constitutional: frail  HEENT:  EOMI,  MMM  Neck: No LAD, No JVD  Respiratory: CTAB  Cardiovascular: S1 and S2  Gastrointestinal: distended diminished BS  Extremities: No peripheral edema  Neurological: A/O x 3, no focal deficits  :  Kc  Skin: No rashes  Access: Not applicable    LABS:                                   11.9   14.06 )-----------( 116      ( 08 May 2022 05:46 )             37.2                           11.9   15.62 )-----------( 150      ( 07 May 2022 03:55 )             36.9                                14.6   5.44  )-----------( 208      ( 06 May 2022 05:12 )             44.7                         14.8   2.88  )-----------( 228      ( 05 May 2022 21:45 )             48.0       146    |  115    |  50     ----------------------------<  103       08 May 2022 05:46  4.0     |  22     |  1.75     146    |  116    |  51     ----------------------------<  91        07 May 2022 03:55  4.2     |  23     |  2.26     146    |  115    |  46     ----------------------------<  91        06 May 2022 17:12  4.3     |  24     |  2.33     Ca    10.7       08 May 2022 05:46  Ca    9.6        07 May 2022 03:55  Ca    9.4        06 May 2022 17:12    Phos  2.7       08 May 2022 05:46  Phos  2.6       07 May 2022 03:55  Phos  2.5       06 May 2022 05:12    Mg     2.1       08 May 2022 05:46  Mg     2.0       07 May 2022 03:55  Mg     1.6       06 May 2022 05:12

## 2022-05-08 NOTE — PROGRESS NOTE ADULT - SUBJECTIVE AND OBJECTIVE BOX
CC:Patient is a 89y old  Male who presents with a chief complaint of abdominal pain       HPI:  89M hx colon ca s/p resection ~15y ago here with mid-epigastric pain x 2d. Mild nausea, no vomiting/diarrhea/constipation. No fever/chills. No clear precipitating factor. Mild tenderness "but it also hurts when I'm not touching it". Claims he had a BM yesterday. Has not really been eating due to pain. The patient had ventral hernia repair in 2021.     Subjective:   : ABdominal Pain improved. No nausea or vomiting. Passing a little flatus., Denies fever, chills, dizziness, chest pain, SOB  5/3: Abdominal pain similar to yesterday NGT failure/removal overnight. No nausea. Passing a little flatus. Still NPO and on IVF.   : Abdominal pain unchanged; Off NGT; Repeat CT ->worsening SBO and ascites. Afebrile and hemodynamically stable.   : more distended; more pain and more nausea. Planned for ex lap today.   : s/p ex lap. Post op hypotension and rise in Cr. IVF boluses ordered to maintain MAP. Transferred to CICU for closer monitoring.   : improved BP. Improved Cr. Improved Urine output. Pain controlled. No nausea. Intermittent confusion but easily re-oriented.   - Pt more awake alert, family at bedside, s/s eval to start clear liquids    Review of Systems: 14 Point review of systems reviewed and reported as negative unless otherwise stated above      PMH: SSS s/p PPM, HLD, HTN, BPH, Colon CA.  PSH: Colon CA s/p  hemicolectomy 2004, appendectomy     Meds: per reconciliation sheet, noted below  MEDICATIONS  (STANDING):  enoxaparin Injectable 30 milliGRAM(s) SubCutaneous every 24 hours  sodium chloride 0.9%. 1000 milliLiter(s) (125 mL/Hr) IV Continuous <Continuous>    MEDICATIONS  (PRN):  HYDROmorphone  Injectable 0.5 milliGRAM(s) IV Push every 6 hours PRN Severe Pain (7 - 10)  metoprolol tartrate Injectable 5 milliGRAM(s) IV Push every 6 hours PRN SBP greater than 150mmhg  ondansetron Injectable 4 milliGRAM(s) IV Push every 8 hours PRN Nausea and/or Vomiting    Allergies    amoxicillin (Other (Severe))  penicillin (Unknown)    Intolerances      Social: no smoking, no alcohol, no illegal drugs; no recent travel, no exposure to TB  FAMILY HISTORY: Father had CAD/ MI, mother  at  old age    Review of Systems: 14 Point review of systems reviewed and reported as negative unless otherwise stated in HPI    I&O's Detail    07 May 2022 07:01  -  08 May 2022 07:00  --------------------------------------------------------  IN:  Total IN: 0 mL    OUT:    Indwelling Catheter - Urethral (mL): 1300 mL  Total OUT: 1300 mL    Total NET: -1300 mL      Vital Signs Last 24 Hrs  T(C): 36.2 (08 May 2022 06:12), Max: 36.8 (07 May 2022 14:00)  T(F): 97.2 (08 May 2022 06:12), Max: 98.2 (07 May 2022 14:00)  HR: 91 (08 May 2022 11:00) (80 - 106)  BP: 115/75 (08 May 2022 09:00) (104/76 - 150/79)  BP(mean): 83 (08 May 2022 09:00) (74 - 98)  RR: 22 (08 May 2022 11:00) (15 - 24)  SpO2: 94% (08 May 2022 11:00) (94% - 97%)      HEENT:  EOMI, PERRLA; Dry mucous membranes  Neck: supple  Back: no tenderness  Respiratory: clear  Cardiovascular: S1S2 regular, no murmurs  Abdomen: appropriately tenders soft; ; hypoactive bowel sounds; Midline surgical incision site with soft surgical dressing; clean and dry  : Kc with clear urine (low volume)  Musculoskeletal: no muscle tenderness, no joint swelling or tenderness  Extremities: no pedal edema  Neurological: AxOx2, moving all extremities, no focal deficits  Skin:  As per Abdominal exam    Labs:                          11.9   14.06 )-----------( 116      ( 08 May 2022 05:46 )             37.2                                   11.9   15.62 )-----------( 150      ( 07 May 2022 03:55 )             36.9     05-08    146<H>  |  115<H>  |  50<H>  ----------------------------<  103<H>  4.0   |  22  |  1.75<H>    Ca    10.7<H>      08 May 2022 05:46  Phos  2.7     05-08  Mg     2.1     05-08    TPro  5.2<L>  /  Alb  1.8<L>  /  TBili  0.3  /  DBili  x   /  AST  21  /  ALT  15  /  AlkPhos  69  05-08      05-07    146<H>  |  116<H>  |  51<H>  ----------------------------<  91  4.2   |  23  |  2.26<H>    Ca    9.6      07 May 2022 03:55  Phos  2.6     05-07  Mg     2.0     05-07    TPro  4.3<L>  /  Alb  1.9<L>  /  TBili  0.3  /  DBili  x   /  AST  21  /  ALT  15  /  AlkPhos  38<L>  05-07    COVID-19 PCR: NotDetec (01 May 2022 09:27)    CAPILLARY BLOOD GLUCOSE                                    14.6   5.44  )-----------( 208      ( 06 May 2022 05:12 )             44.7     05-06    146<H>  |  113<H>  |  39<H>  ----------------------------<  122<H>  3.8   |  22  |  2.33<H>    Ca    9.6      06 May 2022 05:12  Phos  2.5     05-06  Mg     1.6     05-06    TPro  4.5<L>  /  Alb  2.3<L>  /  TBili  0.4  /  DBili  x   /  AST  19  /  ALT  14  /  AlkPhos  32<L>  05-06    COVID-19 PCR: NotDetec (01 May 2022 09:27)    CAPILLARY BLOOD GLUCOSE    Triglycerides, Serum: 98 mg/dL (05.06.22 @ 05:12) Vitamin D, 25-Hydroxy: 36.6:Parathyroid Hormone Intact, Serum (22 @ 11:45)   Calcium.: 10.8:                                   13.2   4.55  )-----------( 189      ( 05 May 2022 08:07 )             40.9     05-05    146<H>  |  112<H>  |  35<H>  ----------------------------<  134<H>  3.6   |  25  |  1.25    Ca    10.4<H>      05 May 2022 08:07  Phos  2.0     05-05  Mg     2.1     05-05    TPro  5.6<L>  /  Alb  2.9<L>  /  TBili  0.6  /  DBili  x   /  AST  23  /  ALT  15  /  AlkPhos  43  05-05    COVID-19 PCR: NotDetec (01 May 2022 09:27)    CAPILLARY BLOOD GLUCOSE          Radiology: CT abd : Right hemicolectomy.High-grade distal small bowel obstruction. Other findings as discussed above.      < from: CT Abdomen and Pelvis w/ Oral Cont (22 @ 14:53) >    IMPRESSION:    Worsening small bowel obstruction with slight increase in dilatation.   Increasing ascites.    Developing small bilateral pleural effusions.    < end of copied text >    I personally reviewed labs, imaging, orders and vitals.   Discussed with RN, Patient, Family, Surgery, Nephro

## 2022-05-08 NOTE — PROGRESS NOTE ADULT - ASSESSMENT
MEDICATIONS  (STANDING):  cefoTEtan  IVPB 1 Gram(s) IV Intermittent every 12 hours  chlorhexidine 4% Liquid 1 Application(s) Topical <User Schedule>  dextrose 5% + sodium chloride 0.45% with potassium chloride 20 mEq/L 1000 milliLiter(s) (75 mL/Hr) IV Continuous <Continuous>  enoxaparin Injectable 30 milliGRAM(s) SubCutaneous every 24 hours  fat emulsion (Fish Oil and Plant Based) 20% Infusion 0.92 Gm/kG/Day (20.9 mL/Hr) IV Continuous <Continuous>  fat emulsion (Fish Oil and Plant Based) 20% Infusion 0.92 Gm/kG/Day (20.83 mL/Hr) IV Continuous <Continuous>  lidocaine 2% Jelly 5 milliLiter(s) IntraUrethral once  Parenteral Nutrition - Adult 1 Each (83 mL/Hr) TPN Continuous <Continuous>  Parenteral Nutrition - Adult 1 Each (83 mL/Hr) TPN Continuous <Continuous>    MEDICATIONS  (PRN):  metoprolol tartrate IVPB 5 milliGRAM(s) IV Intermittent every 6 hours PRN SBP > 150  ondansetron Injectable 4 milliGRAM(s) IV Push every 8 hours PRN Nausea and/or Vomiting  prochlorperazine   Injectable 10 milliGRAM(s) IntraMuscular every 8 hours PRN Nasuea/Vomiting      89M hx colon ca s/p resection in 2004 here with severe abdominal  pain. Symptoms started 2days ago and got worse.  Mild nausea, no vomiting/diarrhea/constipation. No fever/chills. No clear precipitating factor. Mild tenderness "but it also hurts when I'm not touching it". Claims he had a BM yesterday. Has not really been eating due to pain. The patient had ventral hernia repair in July 2021. CT abd shows high grade distal SBO.   assessment Dx:          SBO  YAMINI on CKD III due to nausea/vomiting and decreased PO intake POA and then Resolved then subsequent recurrence with post op YAMINI likely due to hypovolemia/hypotension  Leukocytosis not present on admission. likely post op/reactive  Anemia likely hemodilutional + blood loss related to surgery. More likely hemodilutional.   Hypercalcmemia (multifactorial: dehydration + primary hyperparathyroidism)  HTN  BPH  SSS s/p PPM    -admitted to surgery  -NGT failure and subsequent possible patient removal overnight 5/2-5/3  -Management as per surgery. Attempting conservative management at this time->No improvement as of yet with repeat CT showing worsening SBO findings +ascites  -NPO. Advance diet as per surgery  -Nutrition consult. Likely to have to start PPN vs TPN tomorrow. Nutritonist consult. Morning labs.   -PO meds to IV where possible  -Continue to monitor Cr and electrolytes. Replace electrolytes accordingly  -PT elevated + CKD + Mild hypercalcemia. Hypercalcemia likely multifactorial. From dehydration from decreased PO intake/nausea/vomiting + Primary Hyperparathyroid  -Cardiology consult  -At this point surgery has more benefit than risk. No improvement with coservative management. Expected decline without intervention. Mild hypercalcemia stable and Cardiac wise stable. Patient is moderate risk for perioperative and post operative complications however without intervention there is a much higher risk of decline. Patient currently medically optimized for surgery. Explained risks and benefits in detail and patient and family verbalized understanding.   -5/6: Post op yamini and hypotension. On PPN. IVF as needed for fluid balance. I/Os (mullen in place). Nephro on board. Caution with pain control with hypotentions.   -5/7: Improved urine output, cr. Pain controlled. Leukocytosis with bandeemia (afebrile and BP improved). On ABX. Continue ABX as per surgery. Blood cultures ordered. Continue to monitor closely.   -5/8: More awake , creat slowly improving , to start clear liquids, d/w Pts family at bedside, and pts nurse  DVT Prophylaxis: Lovenox subq  Discussed with pt, daughter, SW/SHABBIR, RN, surgery, nephrology

## 2022-05-08 NOTE — SWALLOW BEDSIDE ASSESSMENT ADULT - SWALLOW EVAL: RECOMMENDED FEEDING/EATING TECHNIQUES
allow for swallow between intakes/alternate food with liquid/crush medication (when feasible)/hard swallow w/ each bite or sip/maintain upright posture during/after eating for 30 mins/position upright (90 degrees)

## 2022-05-08 NOTE — SWALLOW BEDSIDE ASSESSMENT ADULT - PHARYNGEAL PHASE
Mild age-appropriate latency of onset of pharyngeal swallow, palpable laryngeallift. No overt s/s aspiration or failure to clear pharynx across bolus consistencies consumed.

## 2022-05-08 NOTE — PROGRESS NOTE ADULT - SUBJECTIVE AND OBJECTIVE BOX
CC:Patient is a 89y old  Male who presents with a chief complaint of abdominal pain       HPI:  89M hx colon ca s/p resection ~15y ago here with mid-epigastric pain x 2d. Mild nausea, no vomiting/diarrhea/constipation. No fever/chills. No clear precipitating factor. Mild tenderness "but it also hurts when I'm not touching it". Claims he had a BM yesterday. Has not really been eating due to pain. The patient had ventral hernia repair in July 2021.     Subjective:   5/2: Abdominal Pain improved. No nausea or vomiting. Passing a little flatus., Denies fever, chills, dizziness, chest pain, SOB  5/3: Abdominal pain similar to yesterday NGT failure/removal overnight. No nausea. Passing a little flatus. Still NPO and on IVF.   5/4: Abdominal pain unchanged; Off NGT; Repeat CT ->worsening SBO and ascites. Afebrile and hemodynamically stable.   5/5: more distended; more pain and more nausea. Planned for ex lap today.   5/6: s/p ex lap. Post op hypotension and rise in Cr. IVF boluses ordered to maintain MAP. Transferred to CICU for closer monitoring.   5/7: improved BP. Improved Cr. Improved Urine output. Pain controlled. No nausea. Intermittent confusion but easily re-oriented.   5/8: in restraints, not in acute distress     Vital Signs Last 24 Hrs  T(C): 36.2 (08 May 2022 06:12), Max: 36.8 (07 May 2022 14:00)  T(F): 97.2 (08 May 2022 06:12), Max: 98.2 (07 May 2022 14:00)  HR: 106 (08 May 2022 08:00) (80 - 107)  BP: 135/80 (08 May 2022 08:00) (104/76 - 150/79)  BP(mean): 93 (08 May 2022 08:00) (74 - 98)  RR: 21 (08 May 2022 08:00) (16 - 26)  SpO2: 97% (08 May 2022 08:00) (94% - 97%)    PE:    Constitutional: alert confused  HEENT:  EOMI, PERRLA; Dry mucous membranes  Neck: supple  Back: no tenderness  Respiratory: clear  Cardiovascular: S1S2 regular, no murmurs  Abdomen: appropriately tenders soft; ; hypoactive bowel sounds; Midline surgical incision site with soft surgical dressing; clean and dry  : Kc   Musculoskeletal: no muscle tenderness, no joint swelling or tenderness  Extremities: no pedal edema  Neurological: AxOx2, moving all extremities, no focal deficits  Skin:  no rash    Labs:                                           11.9   14.06 )-----------( 116      ( 08 May 2022 05:46 )             37.2   05-08    146<H>  |  115<H>  |  50<H>  ----------------------------<  103<H>  4.0   |  22  |  1.75<H>    Ca    10.7<H>      08 May 2022 05:46  Phos  2.7     05-08  Mg     2.1     05-08    TPro  5.2<L>  /  Alb  1.8<L>  /  TBili  0.3  /  DBili  x   /  AST  21  /  ALT  15  /  AlkPhos  69  05-08        Home Medications:  Advil Dual Action With Acetaminophen 250 mg-125 mg oral tablet: 2 tab(s) orally once a day, As Needed (01 May 2022 21:57)  alfuzosin 10 mg oral tablet, extended release: 1 tab(s) orally once a day (01 May 2022 21:57)  amLODIPine 5 mg oral tablet: 1 tab(s) orally once a day (01 May 2022 21:57)  azelastine nasal: nasal once a day (01 May 2022 21:57)  hydroCHLOROthiazide 25 mg oral tablet: 1 tab(s) orally once a day (01 May 2022 21:57)  losartan 25 mg oral tablet: 1/2  tab(s) orally once a day (01 May 2022 21:57)  metoprolol succinate 25 mg oral tablet, extended release: 1 tab(s) orally once a day (01 May 2022 21:57)      MEDICATIONS  (STANDING):  cefoTEtan  IVPB 1 Gram(s) IV Intermittent every 12 hours  chlorhexidine 4% Liquid 1 Application(s) Topical <User Schedule>  dextrose 5% + sodium chloride 0.45% with potassium chloride 20 mEq/L 1000 milliLiter(s) (75 mL/Hr) IV Continuous <Continuous>  enoxaparin Injectable 30 milliGRAM(s) SubCutaneous every 24 hours  fat emulsion (Fish Oil and Plant Based) 20% Infusion 0.92 Gm/kG/Day (20.83 mL/Hr) IV Continuous <Continuous>  fat emulsion (Fish Oil and Plant Based) 20% Infusion 0.92 Gm/kG/Day (20.9 mL/Hr) IV Continuous <Continuous>  lidocaine 2% Jelly 5 milliLiter(s) IntraUrethral once  metoprolol tartrate Injectable 5 milliGRAM(s) IV Push every 6 hours  Parenteral Nutrition - Adult 1 Each (83 mL/Hr) TPN Continuous <Continuous>  Parenteral Nutrition - Adult 1 Each (83 mL/Hr) TPN Continuous <Continuous>

## 2022-05-08 NOTE — PROGRESS NOTE ADULT - ASSESSMENT
89 yo male with hx of colon ca resection ~ 15 years ago . Admitted with GEOVANI - CT for sm bowel    YAMINI Cr rising  - sec to ATN vs Prerenal postop w hypotension and   Oliguric    POD#1 SBO repair    keep sbp > 110    Hb stable    no ACE or ARB     no IV contrast   No NSAID's    continue IVF boluses, if SBP remains < 90 may require pressors   ? sepsis related hypotension  - IV abx     Hypernatremia sec to IVF boluses and PPN    monitor for now    Hyperchloremic met ACidosis sec to IV saline    continue IV saline for BP for now - may change to hypotonic IVF if BP +/- pressors     encourage po when able      Hypercalcemia - suspected primary HPT    , Cr 1.2    avoid thiazide diuretics   Ca seems stable but if Ca > 11 then would consider Sensipar as pt is too fragile for PTH scan and prob not candidate for PTH surgery      SBO POD #1   per Surgery , per PA pt had large bowel spillage         * pt seen earlier and note now      d/w Dr Araujo and Surgical PA Theodora Hazel covering weekend         5/7  pt now in CICU  chart reviewed  Bowel spillage  Hypotensive oliguric yesterday, improving on IVF  BP improved  cont IVF as outlined   monitor UO/ K/CO2/ Rise in sodium    5/8  Na 146, adjust TPN as needed  More awake alert  BP, labs improving  to start clears po  for transfer out

## 2022-05-08 NOTE — PROGRESS NOTE ADULT - ASSESSMENT
89M hx colon ca s/p resection in 2004 here with severe abdominal  pain. Symptoms started 2days ago and got worse.  Mild nausea, no vomiting/diarrhea/constipation. No fever/chills. No clear precipitating factor. Mild tenderness "but it also hurts when I'm not touching it". Claims he had a BM yesterday. Has not really been eating due to pain. The patient had ventral hernia repair in July 2021. CT abd shows high grade distal SBO.   assessment Dx:      SBO  YAMINI on CKD III due to nausea/vomiting and decreased PO intake POA and then Resolved then subsequent recurrence with post op YAMINI likely due to hypovolemia/hypotension  Leukocytosis not present on admission. likely post op/reactive  Anemia likely hemodilutional + blood loss related to surgery. More likely hemodilutional.   Hypercalcmemia (multifactorial: dehydration + primary hyperparathyroidism)  HTN  BPH  SSS s/p PPM  toxic metabolic encephalopathy multifactorial     -admitted to surgery  - PPN  - POD#3  - Family was concerned about "bradycardia" but likely was seeing the pulse ox HR which is not always picking up correctly or counting if he has a PVC come in per Cardiology  -He remains stable from a cardiac standpoint with improving renal function and stable BP. D/C tele so he can get to a quieter floor which may help with his confusion.  -Patient remains NPO. Once able to take PO recommend placing him back on his home PO BB;  metoprolol IV rather than keeping as PRN to prevent BB withdrawal/tachycardia.  - on empiric abx coverage post op

## 2022-05-08 NOTE — SWALLOW BEDSIDE ASSESSMENT ADULT - ORAL PHASE
immediate bolus formation:  rotary-lateral mastication with lingual sweep; smooth AP transfer for liquid and for puree. ./Within functional limits

## 2022-05-08 NOTE — CONSULT NOTE ADULT - ASSESSMENT
89 y/o Male with h/o colon Ca s/p resection ~15y ago, SSS ss/p PM, HTN, BPH, b/l THR was admitted on 5/7 for worsening mid-epigastric pain x 2d. Mild nausea, no vomiting/diarrhea/constipation. No fever/chills at home. No clear precipitating factor. On 5/5 he underwent an exploratory laparotomy and small bowel resection. He was given cefotetan IV. Postoperative, he became hypotensive, but improved with IV fluids. Alert, but confused. He remained on cefotetan.    1. SBO s/p SBR. ARF on CRF stage 3. Prior colon Ca s/p resection. B/l pleural effusions. Encephalopathy. Allergy to PCN.   -leukocytosis postoperative  -remains afebrile  -renal function is improving  -no clear infectious process identified, but the patient had recent surgery for SBO and has leukocytosis  -possible translocation of intraabdominal bacterial justina  -he is at risk for aspiration  -on cefotetan 1 gm IV q12h  -reason for abx use and side effects reviewed; monitor BMP   -continue abx coverage for now  -old chart reviewed to assess prior cultures  -monitor temps  -f/u CBC  -supportive care  2. Other issues:   -care per medicine

## 2022-05-09 NOTE — PROGRESS NOTE ADULT - SUBJECTIVE AND OBJECTIVE BOX
Awake, alert, no distress, no complaints  VSS afeb  lungs- clear  Cor- RRR  Abd- + BS soft non tender, wound clean,dry  Ext- upper ext edema near old IV sites

## 2022-05-09 NOTE — PROGRESS NOTE ADULT - ASSESSMENT
89M hx colon ca s/p resection in 2004 here with severe abdominal  pain. Symptoms started 2days ago and got worse.  Mild nausea, no vomiting/diarrhea/constipation. No fever/chills. No clear precipitating factor. Mild tenderness "but it also hurts when I'm not touching it". Claims he had a BM yesterday. Has not really been eating due to pain. The patient had ventral hernia repair in July 2021. CT abd shows high grade distal SBO.   assessment Dx:      SBO  YAMINI on CKD III due to nausea/vomiting and decreased PO intake POA and then Resolved then subsequent recurrence with post op YAMINI likely due to hypovolemia/hypotension  Leukocytosis not present on admission. likely post op/reactive  Anemia likely hemodilutional + blood loss related to surgery. More likely hemodilutional.   Hypercalcmemia (multifactorial: dehydration + primary hyperparathyroidism)  HTN  BPH  SSS s/p PPM  toxic metabolic encephalopathy multifactorial     -admitted to surgery  - PPN  - POD#4  - Family was concerned about "bradycardia" but likely was seeing the pulse ox HR which is not always picking up correctly or counting if he has a PVC come in per Cardiology  -He remains stable from a cardiac standpoint with improving renal function and stable BP. D/C tele so he can get to a quieter floor which may help with his confusion.  - start PO BB downgrade to med surg  - on empiric abx coverage post op  - change IVF to D5 w for the hyponatremia             89M hx colon ca s/p resection in 2004 here with severe abdominal  pain. Symptoms started 2days ago and got worse.  Mild nausea, no vomiting/diarrhea/constipation. No fever/chills. No clear precipitating factor. Mild tenderness "but it also hurts when I'm not touching it". Claims he had a BM yesterday. Has not really been eating due to pain. The patient had ventral hernia repair in July 2021. CT abd shows high grade distal SBO.   assessment Dx:      SBO  YAMINI on CKD III due to nausea/vomiting and decreased PO intake POA and then Resolved then subsequent recurrence with post op YAMINI likely due to hypovolemia/hypotension  Leukocytosis not present on admission. likely post op/reactive  Anemia likely hemodilutional + blood loss related to surgery. More likely hemodilutional.   Hypercalcmemia (multifactorial: dehydration + primary hyperparathyroidism)  HTN  BPH  SSS s/p PPM  toxic metabolic encephalopathy multifactorial   urinary retention and decreased urinary output  hypernatremia    -admitted to surgery  - PPN  - POD#4  - Family was concerned about "bradycardia" but likely was seeing the pulse ox HR which is not always picking up correctly or counting if he has a PVC come in per Cardiology  -He remains stable from a cardiac standpoint with improving renal function and stable BP. D/C tele so he can get to a quieter floor which may help with his confusion.  - start PO BB downgrade to med surg  - on empiric abx coverage post op  - change IVF to D5 w for the hyponatremia  - start Flomax

## 2022-05-09 NOTE — PROGRESS NOTE ADULT - ASSESSMENT
Slowly improving. Clear liquids and advance diet as tolerated. PPN for now. Physical therapy. Follow up labs.

## 2022-05-09 NOTE — PROGRESS NOTE ADULT - ASSESSMENT
89 y/o Male with h/o colon Ca s/p resection ~15y ago, SSS ss/p PM, HTN, BPH, b/l THR was admitted on 5/7 for worsening mid-epigastric pain x 2d. Mild nausea, no vomiting/diarrhea/constipation. No fever/chills at home. No clear precipitating factor. On 5/5 he underwent an exploratory laparotomy and small bowel resection. He was given cefotetan IV. Postoperative, he became hypotensive, but improved with IV fluids. Alert, but confused. He remained on cefotetan.    1. SBO s/p SBR. ARF on CRF stage 3. Prior colon Ca s/p resection. B/l pleural effusions. Encephalopathy. Allergy to PCN.   -leukocytosis postoperative  -remains afebrile  -renal function is improving  -no clear infectious process identified, but the patient had recent surgery for SBO and has leukocytosis  -possible translocation of intraabdominal bacterial justina  -he is at risk for aspiration  -on cefotetan 1 gm IV q12h # 4  -tolerating abx well so far; no side effects noted  -continue abx coverage for now  -monitor abdomen  -monitor temps  -f/u CBC  -supportive care  2. Other issues:   -care per medicine

## 2022-05-09 NOTE — PROGRESS NOTE ADULT - SUBJECTIVE AND OBJECTIVE BOX
CC:Patient is a 89y old  Male who presents with a chief complaint of abdominal pain       HPI:  89M hx colon ca s/p resection ~15y ago here with mid-epigastric pain x 2d. Mild nausea, no vomiting/diarrhea/constipation. No fever/chills. No clear precipitating factor. Mild tenderness "but it also hurts when I'm not touching it". Claims he had a BM yesterday. Has not really been eating due to pain. The patient had ventral hernia repair in July 2021.     Subjective:   5/2: Abdominal Pain improved. No nausea or vomiting. Passing a little flatus., Denies fever, chills, dizziness, chest pain, SOB  5/3: Abdominal pain similar to yesterday NGT failure/removal overnight. No nausea. Passing a little flatus. Still NPO and on IVF.   5/4: Abdominal pain unchanged; Off NGT; Repeat CT ->worsening SBO and ascites. Afebrile and hemodynamically stable.   5/5: more distended; more pain and more nausea. Planned for ex lap today.   5/6: s/p ex lap. Post op hypotension and rise in Cr. IVF boluses ordered to maintain MAP. Transferred to CICU for closer monitoring.   5/7: improved BP. Improved Cr. Improved Urine output. Pain controlled. No nausea. Intermittent confusion but easily re-oriented.   5/8: in restraints, not in acute distress   5/9: more alert    Vital Signs Last 24 Hrs  T(C): 36.3 (09 May 2022 08:38), Max: 36.3 (09 May 2022 08:38)  T(F): 97.3 (09 May 2022 08:38), Max: 97.3 (09 May 2022 08:38)  HR: 81 (09 May 2022 09:00) (73 - 100)  BP: 151/89 (09 May 2022 09:00) (97/73 - 176/76)  BP(mean): 101 (09 May 2022 09:00) (66 - 114)  RR: 24 (09 May 2022 09:00) (16 - 28)  SpO2: 99% (09 May 2022 04:00) (94% - 99%)    PE:    Constitutional: alert confused  HEENT:  EOMI, PERRLA; Dry mucous membranes  Neck: supple  Back: no tenderness  Respiratory: clear  Cardiovascular: S1S2 regular, no murmurs  Abdomen: appropriately tenders soft; ; hypoactive bowel sounds; Midline surgical incision site with soft surgical dressing; clean and dry  : Kc   Musculoskeletal: no muscle tenderness, no joint swelling or tenderness  Extremities: no pedal edema  Neurological: AxOx2, moving all extremities, no focal deficits  Skin:  no rash    Labs:                                                    11.0   11.19 )-----------( 121      ( 09 May 2022 06:04 )             32.5   05-09    148<H>  |  115<H>  |  54<H>  ----------------------------<  124<H>  3.5   |  26  |  1.50<H>    Ca    10.1      09 May 2022 06:04  Phos  2.6     05-09  Mg     2.2     05-09    TPro  4.9<L>  /  Alb  1.7<L>  /  TBili  0.3  /  DBili  x   /  AST  21  /  ALT  19  /  AlkPhos  72  05-09          Home Medications:  Advil Dual Action With Acetaminophen 250 mg-125 mg oral tablet: 2 tab(s) orally once a day, As Needed (01 May 2022 21:57)  alfuzosin 10 mg oral tablet, extended release: 1 tab(s) orally once a day (01 May 2022 21:57)  amLODIPine 5 mg oral tablet: 1 tab(s) orally once a day (01 May 2022 21:57)  azelastine nasal: nasal once a day (01 May 2022 21:57)  hydroCHLOROthiazide 25 mg oral tablet: 1 tab(s) orally once a day (01 May 2022 21:57)  losartan 25 mg oral tablet: 1/2  tab(s) orally once a day (01 May 2022 21:57)  metoprolol succinate 25 mg oral tablet, extended release: 1 tab(s) orally once a day (01 May 2022 21:57)      MEDICATIONS  (STANDING):  cefoTEtan  IVPB 1 Gram(s) IV Intermittent every 12 hours  chlorhexidine 4% Liquid 1 Application(s) Topical <User Schedule>  dextrose 5% + sodium chloride 0.45% with potassium chloride 20 mEq/L 1000 milliLiter(s) (75 mL/Hr) IV Continuous <Continuous>  enoxaparin Injectable 30 milliGRAM(s) SubCutaneous every 24 hours  fat emulsion (Fish Oil and Plant Based) 20% Infusion 0.92 Gm/kG/Day (20.83 mL/Hr) IV Continuous <Continuous>  fat emulsion (Fish Oil and Plant Based) 20% Infusion 0.92 Gm/kG/Day (20.9 mL/Hr) IV Continuous <Continuous>  lidocaine 2% Jelly 5 milliLiter(s) IntraUrethral once  metoprolol tartrate Injectable 5 milliGRAM(s) IV Push every 6 hours  Parenteral Nutrition - Adult 1 Each (83 mL/Hr) TPN Continuous <Continuous>  Parenteral Nutrition - Adult 1 Each (83 mL/Hr) TPN Continuous <Continuous>

## 2022-05-09 NOTE — PROGRESS NOTE ADULT - SUBJECTIVE AND OBJECTIVE BOX
Date of service: 05-09-22 @ 12:50    Lying in bed in NAD  Alert and verbal  Allowed liquid diet  No fever    ROS: no fever or chills; denies dizziness, no HA, no SOB or cough, no abdominal pain, no diarrhea or constipation; no dysuria, no legs pain, no rashes    MEDICATIONS  (STANDING):  cefoTEtan  IVPB 1 Gram(s) IV Intermittent every 12 hours  chlorhexidine 4% Liquid 1 Application(s) Topical <User Schedule>  dextrose 5%. 1000 milliLiter(s) (80 mL/Hr) IV Continuous <Continuous>  enoxaparin Injectable 30 milliGRAM(s) SubCutaneous every 24 hours  fat emulsion (Fish Oil and Plant Based) 20% Infusion 0.92 Gm/kG/Day (20.83 mL/Hr) IV Continuous <Continuous>  lidocaine 2% Jelly 5 milliLiter(s) IntraUrethral once  metoprolol tartrate 25 milliGRAM(s) Oral two times a day  Parenteral Nutrition - Adult 1 Each (83 mL/Hr) TPN Continuous <Continuous>  Parenteral Nutrition - Adult 1 Each (92 mL/Hr) TPN Continuous <Continuous>  silver sulfADIAZINE 1% Cream 1 Application(s) Topical daily  tamsulosin 0.4 milliGRAM(s) Oral at bedtime    Vital Signs Last 24 Hrs  T(C): 36.4 (09 May 2022 11:59), Max: 36.4 (09 May 2022 11:59)  T(F): 97.6 (09 May 2022 11:59), Max: 97.6 (09 May 2022 11:59)  HR: 80 (09 May 2022 11:00) (64 - 95)  BP: 146/122 (09 May 2022 11:00) (97/73 - 176/76)  BP(mean): 128 (09 May 2022 11:00) (66 - 128)  RR: 29 (09 May 2022 11:00) (16 - 29)  SpO2: 96% (09 May 2022 11:00) (96% - 99%)     Physical exam:    Constitutional:  No acute distress  HEENT: NC/AT, EOMI, PERRLA, conjunctivae clear; ears and nose atraumatic  Neck: supple; thyroid not palpable  Back: no tenderness  Respiratory: respiratory effort normal; crackles at bases  Cardiovascular: S1S2 regular, no murmurs  Abdomen: soft, not tender, not distended, positive BS  Genitourinary: no suprapubic tenderness  Lymphatic: no LN palpable  Musculoskeletal: no muscle tenderness, no joint swelling or tenderness  Extremities: no pedal edema  Neurological/ Psychiatric: Alert, moving all extremities  Skin: no rashes; no palpable lesions    Labs: reviewed                        11.0   11.19 )-----------( 121      ( 09 May 2022 06:04 )             32.5     05-09    148<H>  |  115<H>  |  54<H>  ----------------------------<  124<H>  3.5   |  26  |  1.50<H>    Ca    10.1      09 May 2022 06:04  Phos  2.6     05-09  Mg     2.2     05-09    TPro  4.9<L>  /  Alb  1.7<L>  /  TBili  0.3  /  DBili  x   /  AST  21  /  ALT  19  /  AlkPhos  72  05-09                        11.9   14.06 )-----------( 116      ( 08 May 2022 05:46 )             37.2     05-08    146<H>  |  115<H>  |  50<H>  ----------------------------<  103<H>  4.0   |  22  |  1.75<H>    Ca    10.7<H>      08 May 2022 05:46  Phos  2.7     05-08  Mg     2.1     05-08    TPro  5.2<L>  /  Alb  1.8<L>  /  TBili  0.3  /  DBili  x   /  AST  21  /  ALT  15  /  AlkPhos  69  05-08     LIVER FUNCTIONS - ( 08 May 2022 05:46 )  Alb: 1.8 g/dL / Pro: 5.2 gm/dL / ALK PHOS: 69 U/L / ALT: 15 U/L / AST: 21 U/L / GGT: x             Culture - Blood (collected 07 May 2022 09:46)  Source: .Blood None  Preliminary Report (08 May 2022 17:01):    No growth to date.    Culture - Blood (collected 07 May 2022 09:40)  Source: .Blood None  Preliminary Report (08 May 2022 17:01):    No growth to date.    Radiology: all available radiological tests reviewed    < from: CT Abdomen and Pelvis w/ Oral Cont (05.04.22 @ 14:53) >  Worsening small bowel obstruction with slight increase in dilatation.   Increasing ascites.    Developing small bilateral pleural effusions.    < end of copied text >      Advanced directives addressed: full resuscitation

## 2022-05-09 NOTE — PROGRESS NOTE ADULT - ASSESSMENT
89 yo male with hx of colon ca resection ~ 15 years ago . Admitted with GEOVANI - CT for sm bowel    YAMINI postop  ATN w hypotension   Cr now improving   Kc removed - bladder scan/straight cath prn    no ACE or ARB     no IV contrast   No NSAID's    Hypernatremia    agree with D5W for now  , free water po as able   adjust PPN per Surgery/Nutrition - hypotonic fluids       Hypercalcemia - suspected primary HPT    ,  avoid thiazide diuretics   Ca stable but if Ca > 11 then would consider Sensipar as pt is too fragile for PTH scan and prob not candidate for PTH surgery      SBO   per Surgery    large bowel spillage introp         pt seen earlier and note now

## 2022-05-09 NOTE — ADVANCED PRACTICE NURSE CONSULT - ASSESSMENT
this is a 89 year old male and admitted to the hospital on 5/1/2022 for ABD pain and diagnosed with Small Bowel Obstruction. PMHx: HLD, PPM, Heart Block, Colon cancer, BPH, HTN.   Asked to assess right arm s/p IV infiltration. No skin with no redness. noted 4 partial thickness skin loss. Will apply Silvadene to right forearm and cover with Adaptic, nonstick telfa and wrap with dinah.

## 2022-05-09 NOTE — CHART NOTE - NSCHARTNOTEFT_GEN_A_CORE
Clinical Nutrition PPN Follow Up Note    *89 yo male admitted with high grad distal SBO, YAMINI on CKD now s/p ex lap on 5/5.  hypercalcemia 2/2 dehydration and primary hyperparathyroidism. Pt started on PPN on 5/5 2/2 SBO and extended time NPO.    *current status: As per surgery on 5/6: "s/p Expl lap, SBR for high grade SBO. Pt likely volume depleted with low urine output, elevated CR. Will bolus IVF to treat low UO."  Nephrology is following. Pt transferred to CICU for closer monitoring.   NGT pulled out on 5/5, pt refused replacement. Urine output improving. S/P SLP evaluation with recommendations regular consistency with thin liquids when medically feasible.     *labs reviewed; hypernatremia will increase volume in bag and lower sodium. Potassium on lower range of normal will increase 10 mEq. Phosphorus on lower range of normal will increase 5 mmol (total phosphate 40 mmol). POCTs not documented as per protocol order ISS coverage w/ POCTs Q 6 hr at high risk of hyperglycemia while on PN.     05-09    148<H>  |  115<H>  |  54<H>  ----------------------------<  124<H>  3.5   |  26  |  1.50<H>    Ca    10.1      09 May 2022 06:04  Phos  2.6     05-09  Mg     2.2     05-09    TPro  4.9<L>  /  Alb  1.7<L>  /  TBili  0.3  /  DBili  x   /  AST  21  /  ALT  19  /  AlkPhos  72  05-09  BMI: BMI (kg/m2): 17.7 (05-01-22 @ 08:41)  HbA1c:   Glucose:   BP: 144/81 (05-09-22 @ 07:00) (85/45 - 176/76)  Lipid Panel: Date/Time: 05-06-22 @ 05:12  Cholesterol, Serum: --  Direct LDL: --  HDL Cholesterol, Serum: --  Total Cholesterol/HDL Ration Measurement: --  Triglycerides, Serum: 98  **POCTs Q 6 hr- protocol for PN unable to determine status.       *pertinent meds: Dextrose 5% + NaCl 0.45% w/ KCl 20 mEq IV, metoprolol, Zofran, prochlorperazine     *I&O's Detail    08 May 2022 07:01  -  09 May 2022 07:00  --------------------------------------------------------  IN:    Fat Emulsion (Fish Oil &amp; Plant Based) 20% Infusion: 250 mL    Fat Emulsion (Fish Oil &amp; Plant Based) 20% Infusion: 209 mL    Oral Fluid: 120 mL    PPN (Peripheral Parenteral Nutrition): 996 mL    sodium chloride 0.9%: 960 mL  Total IN: 2535 mL    OUT:    Intermittent Catheterization - Urethral (mL): 650 mL    Voided (mL): 425 mL  Total OUT: 1075 mL    Total NET: 1460 mL        * fluid status: positive net volume. Pt remains hypernatremic will increase fluid 2200ml x 24 hr and continue with minimal sodium in bag.   *NO BM doc'd x 8 days since admission. pt not on bowel regimen.    *Mahesh Score of 12; stg 1 PI on sacrum documented. (1+) L arm edema documented.    *Malnutrition: Pt continues to meet criteria for severe malnutrition in acute on chronic illness r/t decreased ability to consume sufficient nutr 2/2 SBO on CA AEB meeting <50% of ENN x 7 days, severe muscle/ fat wasting    Estimated Needs: based on 55Kg (wt from 5/5, bedscale wt obtained by RD)  Calories: 9390-1014 Kcal (30-35 Kcal/Kg)  Protein:   g protein (1.5-2g/Kg)  Fluids:  9402-9423 mL (30-35mL/Kg)    Diet, NPO (05-05-22 @ 19:40) [Active]    Weight History: no significant changes x 2 days (admission weight may be inaccurate as there is a large discrepancy)   66kg (5/8)  66Kg (5/7)  Height (cm): 175.3 (05-01-22 @ 08:41)  Weight (kg): 54.4 (05-01-22 @ 08:41)  BMI (kg/m2): 17.7 (05-01-22 @ 08:41)  BSA (m2): 1.66 (05-01-22 @ 08:41)    PPN Recommendations: via peripheral line  Total Volume: 2200 mL  95 g  Amino Acids  100 g Dextrose  50 g Lipids 20%  117 mEq Sodium Chloride  0 mEq Sodium Acetate  20 mmol Sodium Phosphate  39 mEq Potassium Chloride  12 mEq Potassium Acetate  20 mmol Potassium Phosphate  0 mEq Calcium Gluconate  10 mEq Magnesium Sulfate  100 mg Thiamine  1 ml Trace Elements  10 ml MVI    Total Calories  1278 Kcal    (Meets  77%  of  Estimated Energy needs  and  100% LOW-END Protein needs)     (osmolarity ~879)    Additional Recommendations:  1) Daily weights  2) Strict I & O's  3) Daily lyte checks including magnesium and phos  4) Weekly triglycerides/LFT checks  5) POCT q6hrs; maintain 140-180mg/dL  6) Monitor BM's - consider implementing bowel regimen    *will monitor and adjust treatement plan prn*  Mona Monaco RD office: [phone]   Cell# [phone] Clinical Nutrition PPN Follow Up Note    *89 yo male admitted with high grad distal SBO, YAMINI on CKD now s/p ex lap on 5/5.  hypercalcemia 2/2 dehydration and primary hyperparathyroidism. Pt started on PPN on 5/5 2/2 SBO and extended time NPO.    *current status: As per surgery on 5/6: "s/p Expl lap, SBR for high grade SBO. Pt likely volume depleted with low urine output, elevated CR. Will bolus IVF to treat low UO."  Nephrology is following. Pt transferred to CICU for closer monitoring.   NGT pulled out on 5/5, pt refused replacement. Urine output improving. S/P SLP evaluation with recommendations regular consistency with thin liquids when medically feasible.     *labs reviewed; hypernatremia will increase volume in bag and lower sodium. Spoke with surgical PA will change IVF to 1/2 normal saline @ 125 ml/hr. Potassium on lower range of normal will increase 10 mEq. Phosphorus on lower range of normal will increase 5 mmol (total phosphate 40 mmol). POCTs not documented as per protocol order ISS coverage w/ POCTs Q 6 hr at high risk of hyperglycemia while on PN.     05-09    148<H>  |  115<H>  |  54<H>  ----------------------------<  124<H>  3.5   |  26  |  1.50<H>    Ca    10.1      09 May 2022 06:04  Phos  2.6     05-09  Mg     2.2     05-09    TPro  4.9<L>  /  Alb  1.7<L>  /  TBili  0.3  /  DBili  x   /  AST  21  /  ALT  19  /  AlkPhos  72  05-09  BMI: BMI (kg/m2): 17.7 (05-01-22 @ 08:41)  HbA1c:   Glucose:   BP: 144/81 (05-09-22 @ 07:00) (85/45 - 176/76)  Lipid Panel: Date/Time: 05-06-22 @ 05:12  Cholesterol, Serum: --  Direct LDL: --  HDL Cholesterol, Serum: --  Total Cholesterol/HDL Ration Measurement: --  Triglycerides, Serum: 98  **POCTs Q 6 hr- protocol for PN unable to determine status.       *pertinent meds: Dextrose 5% + NaCl 0.45% w/ KCl 20 mEq IV, metoprolol, Zofran, prochlorperazine     *I&O's Detail    08 May 2022 07:01  -  09 May 2022 07:00  --------------------------------------------------------  IN:    Fat Emulsion (Fish Oil &amp; Plant Based) 20% Infusion: 250 mL    Fat Emulsion (Fish Oil &amp; Plant Based) 20% Infusion: 209 mL    Oral Fluid: 120 mL    PPN (Peripheral Parenteral Nutrition): 996 mL    sodium chloride 0.9%: 960 mL  Total IN: 2535 mL    OUT:    Intermittent Catheterization - Urethral (mL): 650 mL    Voided (mL): 425 mL  Total OUT: 1075 mL    Total NET: 1460 mL        * fluid status: positive net volume. Pt remains hypernatremic will increase fluid 2200ml x 24 hr and continue with minimal sodium in bag.   *NO BM doc'd x 8 days since admission. pt not on bowel regimen.    *Mahesh Score of 12; stg 1 PI on sacrum documented. (1+) L arm edema documented.    *Malnutrition: Pt continues to meet criteria for severe malnutrition in acute on chronic illness r/t decreased ability to consume sufficient nutr 2/2 SBO on CA AEB meeting <50% of ENN x 7 days, severe muscle/ fat wasting    Estimated Needs: based on 55Kg (wt from 5/5, [facility] wt obtained by RD)  Calories: 0089-7817 Kcal (30-35 Kcal/Kg)  Protein:   g protein (1.5-2g/Kg)  Fluids:  4748-1849 mL (30-35mL/Kg)    Diet, NPO (05-05-22 @ 19:40) [Active]    Weight History: no significant changes x 2 days (admission weight may be inaccurate as there is a large discrepancy)   66kg (5/8)  66Kg (5/7)  Height (cm): 175.3 (05-01-22 @ 08:41)  Weight (kg): 54.4 (05-01-22 @ 08:41)  BMI (kg/m2): 17.7 (05-01-22 @ 08:41)  BSA (m2): 1.66 (05-01-22 @ 08:41)    PPN Recommendations: via peripheral line  Total Volume: 2200 mL  95 g  Amino Acids  100 g Dextrose  50 g Lipids 20%  117 mEq Sodium Chloride  0 mEq Sodium Acetate  20 mmol Sodium Phosphate  39 mEq Potassium Chloride  12 mEq Potassium Acetate  20 mmol Potassium Phosphate  0 mEq Calcium Gluconate  10 mEq Magnesium Sulfate  100 mg Thiamine  1 ml Trace Elements  10 ml MVI    Total Calories  1278 Kcal    (Meets  77%  of  Estimated Energy needs  and  100% LOW-END Protein needs)     (osmolarity ~879)    Additional Recommendations:  1) Daily weights  2) Strict I & O's  3) Daily lyte checks including magnesium and phos  4) Weekly triglycerides/LFT checks  5) POCT q6hrs; maintain 140-180mg/dL  6) Monitor BM's - consider implementing bowel regimen    *will monitor and adjust treatement plan prn*  Mona Monaco RD office: [phone]   Cell# [phone]

## 2022-05-09 NOTE — PROGRESS NOTE ADULT - SUBJECTIVE AND OBJECTIVE BOX
89M hx colon ca s/p resection ~15y ago here with mid-epigastric pain x 2d. Mild nausea, no vomiting/diarrhea/constipation. No fever/chills. No clear precipitating factor. Mild tenderness "but it also hurts when I'm not touching it". Claims he had a BM yesterday. Has not really been eating due to pain. The patient had ventral hernia repair in July 2021.   CT scan today worsening SBO - for OR later today   renal eval for hypercalcemia       5/9    taking po liquids   no complaints       PAST MEDICAL & SURGICAL HISTORY:  HTN (hypertension)    BPH (benign prostatic hyperplasia)    Ventral hernia    Colon cancer  h/o chemo 2004    Heart block    Pacemaker    HLD (hyperlipidemia)    H/O colectomy  2004    History of carpal tunnel surgery of left wrist    H/O hernia repair    History of total hip replacement, bilateral    History of appendectomy  ruptured    Cardiac pacemaker    H/O ventral hernia repair    Vital Signs Last 24 Hrs  T(C): 36.8 (09 May 2022 17:20), Max: 37.6 (09 May 2022 15:00)  T(F): 98.3 (09 May 2022 17:20), Max: 99.6 (09 May 2022 15:00)  HR: 83 (09 May 2022 17:20) (64 - 95)  BP: 150/57 (09 May 2022 17:20) (121/70 - 171/98)  BP(mean): 90 (09 May 2022 15:00) (78 - 128)  RR: 18 (09 May 2022 17:20) (16 - 29)  SpO2: 96% (09 May 2022 17:20) (96% - 99%)      I&O's Detail    08 May 2022 07:01  -  09 May 2022 07:00  --------------------------------------------------------  IN:    Fat Emulsion (Fish Oil &amp; Plant Based) 20% Infusion: 250 mL    Fat Emulsion (Fish Oil &amp; Plant Based) 20% Infusion: 209 mL    Oral Fluid: 120 mL    PPN (Peripheral Parenteral Nutrition): 996 mL    sodium chloride 0.9%: 1040 mL  Total IN: 2615 mL    OUT:    Intermittent Catheterization - Urethral (mL): 650 mL    Voided (mL): 425 mL  Total OUT: 1075 mL    Total NET: 1540 mL    PHYSICAL EXAM:    Constitutional: frail  HEENT:  EOMI,  MMM  Neck: No LAD, No JVD  Respiratory: CTAB  Cardiovascular: S1 and S2  Gastrointestinal: distended diminished BS  Extremities: No peripheral edema  Neurological: A/O x 3, no focal deficits  :  Kc no  Skin: No rashes  Access: Not applicable    LABS:                                     11.0   11.19 )-----------( 121      ( 09 May 2022 06:04 )             32.5                         11.9   14.06 )-----------( 116      ( 08 May 2022 05:46 )             37.2         148    |  115    |  54     ----------------------------<  124       09 May 2022 06:04  3.5     |  26     |  1.50     146    |  115    |  50     ----------------------------<  103       08 May 2022 05:46  4.0     |  22     |  1.75     146    |  116    |  51     ----------------------------<  91        07 May 2022 03:55  4.2     |  23     |  2.26     Ca    10.1       09 May 2022 06:04  Ca    10.7       08 May 2022 05:46    Phos  2.6       09 May 2022 06:04  Phos  2.7       08 May 2022 05:46    Mg     2.2       09 May 2022 06:04  Mg     2.1       08 May 2022 05:46    TPro  4.9    /  Alb  1.7    /  TBili  0.3    /        09 May 2022 06:04  DBili  x      /  AST  21     /  ALT  19     /  AlkPhos  72       TPro  5.2    /  Alb  1.8    /  TBili  0.3    /        08 May 2022 05:46  DBili  x      /  AST  21     /  ALT  15     /  AlkPhos  69

## 2022-05-10 NOTE — PROGRESS NOTE ADULT - SUBJECTIVE AND OBJECTIVE BOX
Patient is a 89y Male who reports no complaints as new.       MEDICATIONS  (STANDING):  cefoTEtan  IVPB 1 Gram(s) IV Intermittent every 12 hours  chlorhexidine 4% Liquid 1 Application(s) Topical <User Schedule>  fat emulsion (Fish Oil and Plant Based) 20% Infusion 0.92 Gm/kG/Day (20.83 mL/Hr) IV Continuous <Continuous>  fat emulsion (Fish Oil and Plant Based) 20% Infusion 0.92 Gm/kG/Day (20.83 mL/Hr) IV Continuous <Continuous>  metoprolol tartrate 25 milliGRAM(s) Oral two times a day  Parenteral Nutrition - Adult 1 Each (92 mL/Hr) TPN Continuous <Continuous>  Parenteral Nutrition - Adult 1 Each (92 mL/Hr) TPN Continuous <Continuous>  silver sulfADIAZINE 1% Cream 1 Application(s) Topical daily  tamsulosin 0.4 milliGRAM(s) Oral at bedtime    MEDICATIONS  (PRN):  ondansetron Injectable 4 milliGRAM(s) IV Push every 8 hours PRN Nausea and/or Vomiting  prochlorperazine   Injectable 10 milliGRAM(s) IntraMuscular every 8 hours PRN Nasuea/Vomiting        T(C): , Max: 37.8 (05-09-22 @ 21:24)  T(F): , Max: 100 (05-09-22 @ 21:24)  HR: 96 (05-10-22 @ 08:23)  BP: 156/88 (05-10-22 @ 08:23)  BP(mean): 90 (05-09-22 @ 15:00)  RR: 18 (05-10-22 @ 08:23)  SpO2: 90% (05-10-22 @ 08:23)  Wt(kg): --    05-09 @ 07:01  -  05-10 @ 07:00  --------------------------------------------------------  IN: 0 mL / OUT: 600 mL / NET: -600 mL    05-10 @ 07:01  -  05-10 @ 12:50  --------------------------------------------------------  IN: 0 mL / OUT: 500 mL / NET: -500 mL          PHYSICAL EXAM:    Constitutional: frail  HEENT: temp wasting  Neck: No LAD, No JVD  Respiratory:dist  Cardiovascular: S1 and S2   Extremities: chronic  Neurological: Alert           LABS:                        9.9    8.49  )-----------( 48       ( 10 May 2022 04:12 )             30.8     10 May 2022 04:12    142    |  114    |  53     ----------------------------<  126    3.8     |  22     |  1.40   09 May 2022 06:04    148    |  115    |  54     ----------------------------<  124    3.5     |  26     |  1.50   08 May 2022 05:46    146    |  115    |  50     ----------------------------<  103    4.0     |  22     |  1.75   07 May 2022 03:55    146    |  116    |  51     ----------------------------<  91     4.2     |  23     |  2.26   06 May 2022 17:12    146    |  115    |  46     ----------------------------<  91     4.3     |  24     |  2.33     Ca    9.2        10 May 2022 04:12  Ca    10.1       09 May 2022 06:04  Ca    10.7       08 May 2022 05:46  Ca    9.6        07 May 2022 03:55  Ca    9.4        06 May 2022 17:12  Phos  2.4       10 May 2022 04:12  Phos  2.6       09 May 2022 06:04  Phos  2.7       08 May 2022 05:46  Phos  2.6       07 May 2022 03:55  Mg     1.9       10 May 2022 04:12  Mg     2.2       09 May 2022 06:04  Mg     2.1       08 May 2022 05:46  Mg     2.0       07 May 2022 03:55    TPro  4.9    /  Alb  1.7    /  TBili  0.3    /  DBili  x      /  AST  21     /  ALT  19     /  AlkPhos  72     09 May 2022 06:04  TPro  5.2    /  Alb  1.8    /  TBili  0.3    /  DBili  x      /  AST  21     /  ALT  15     /  AlkPhos  69     08 May 2022 05:46  TPro  4.3    /  Alb  1.9    /  TBili  0.3    /  DBili  x      /  AST  21     /  ALT  15     /  AlkPhos  38     07 May 2022 03:55          Urine Studies:          RADIOLOGY & ADDITIONAL STUDIES:

## 2022-05-10 NOTE — PROGRESS NOTE ADULT - ASSESSMENT
89 y/o Male with h/o colon Ca s/p resection ~15y ago, SSS ss/p PM, HTN, BPH, b/l THR was admitted on 5/7 for worsening mid-epigastric pain x 2d. Mild nausea, no vomiting/diarrhea/constipation. No fever/chills at home. No clear precipitating factor. On 5/5 he underwent an exploratory laparotomy and small bowel resection. He was given cefotetan IV. Postoperative, he became hypotensive, but improved with IV fluids. Alert, but confused. He remained on cefotetan.    1. SBO s/p SBR. CRF stage 3. Prior colon Ca s/p resection. B/l pleural effusions. Encephalopathy. Allergy to PCN.   -leukocytosis postoperative  -remains afebrile  -renal function is improving  -no clear infectious process identified, but the patient had recent surgery for SBO and has leukocytosis  -possible translocation of intraabdominal bacterial justina  -he is at risk for aspiration  -on cefotetan 1 gm IV q12h # 5  -tolerating abx well so far; no side effects noted  -continue abx coverage   -monitor abdomen  -monitor temps  -f/u CBC  -supportive care  2. Other issues:   -care per medicine

## 2022-05-10 NOTE — PROGRESS NOTE ADULT - SUBJECTIVE AND OBJECTIVE BOX
Date of service: 05-10-22 @ 12:05    Lying in bed in NAD  Weak looking  Alert  Has low grade fever    ROS: no fever or chills; no HA, no SOB or cough, no diarrhea or constipation; no dysuria, no legs pain, no rashes    MEDICATIONS  (STANDING):  cefoTEtan  IVPB 1 Gram(s) IV Intermittent every 12 hours  chlorhexidine 4% Liquid 1 Application(s) Topical <User Schedule>  fat emulsion (Fish Oil and Plant Based) 20% Infusion 0.92 Gm/kG/Day (20.83 mL/Hr) IV Continuous <Continuous>  fat emulsion (Fish Oil and Plant Based) 20% Infusion 0.92 Gm/kG/Day (20.83 mL/Hr) IV Continuous <Continuous>  metoprolol tartrate 25 milliGRAM(s) Oral two times a day  Parenteral Nutrition - Adult 1 Each (92 mL/Hr) TPN Continuous <Continuous>  Parenteral Nutrition - Adult 1 Each (92 mL/Hr) TPN Continuous <Continuous>  silver sulfADIAZINE 1% Cream 1 Application(s) Topical daily  tamsulosin 0.4 milliGRAM(s) Oral at bedtime    Vital Signs Last 24 Hrs  T(C): 37.1 (10 May 2022 08:23), Max: 37.8 (09 May 2022 21:24)  T(F): 98.8 (10 May 2022 08:23), Max: 100 (09 May 2022 21:24)  HR: 96 (10 May 2022 08:23) (51 - 96)  BP: 156/88 (10 May 2022 08:23) (131/75 - 156/88)  BP(mean): 90 (09 May 2022 15:00) (90 - 96)  RR: 18 (10 May 2022 08:23) (16 - 28)  SpO2: 90% (10 May 2022 08:23) (90% - 97%)     Physical exam:    Constitutional:  No acute distress  HEENT: NC/AT, EOMI, PERRLA, conjunctivae clear; ears and nose atraumatic  Neck: supple; thyroid not palpable  Back: no tenderness  Respiratory: respiratory effort normal; crackles at bases  Cardiovascular: S1S2 regular, no murmurs  Abdomen: soft, not tender, not distended, positive BS  Genitourinary: no suprapubic tenderness  Lymphatic: no LN palpable  Musculoskeletal: no muscle tenderness, no joint swelling or tenderness  Extremities: no pedal edema  Neurological/ Psychiatric: Alert, moving all extremities  Skin: no rashes; no palpable lesions    Labs: reviewed                        9.9    8.49  )-----------( 48       ( 10 May 2022 04:12 )             30.8     05-10    142  |  114<H>  |  53<H>  ----------------------------<  126<H>  3.8   |  22  |  1.40<H>    Ca    9.2      10 May 2022 04:12  Phos  2.4     05-10  Mg     1.9     05-10    TPro  4.9<L>  /  Alb  1.7<L>  /  TBili  0.3  /  DBili  x   /  AST  21  /  ALT  19  /  AlkPhos  72  05-09                        11.0   11.19 )-----------( 121      ( 09 May 2022 06:04 )             32.5     05-09    148<H>  |  115<H>  |  54<H>  ----------------------------<  124<H>  3.5   |  26  |  1.50<H>    Ca    10.1      09 May 2022 06:04  Phos  2.6     05-09  Mg     2.2     05-09    TPro  4.9<L>  /  Alb  1.7<L>  /  TBili  0.3  /  DBili  x   /  AST  21  /  ALT  19  /  AlkPhos  72  05-09                        11.9   14.06 )-----------( 116      ( 08 May 2022 05:46 )             37.2     05-08    146<H>  |  115<H>  |  50<H>  ----------------------------<  103<H>  4.0   |  22  |  1.75<H>    Ca    10.7<H>      08 May 2022 05:46  Phos  2.7     05-08  Mg     2.1     05-08    TPro  5.2<L>  /  Alb  1.8<L>  /  TBili  0.3  /  DBili  x   /  AST  21  /  ALT  15  /  AlkPhos  69  05-08     LIVER FUNCTIONS - ( 08 May 2022 05:46 )  Alb: 1.8 g/dL / Pro: 5.2 gm/dL / ALK PHOS: 69 U/L / ALT: 15 U/L / AST: 21 U/L / GGT: x             Culture - Blood (collected 07 May 2022 09:46)  Source: .Blood None  Preliminary Report (08 May 2022 17:01):    No growth to date.    Culture - Blood (collected 07 May 2022 09:40)  Source: .Blood None  Preliminary Report (08 May 2022 17:01):    No growth to date.    Radiology: all available radiological tests reviewed    < from: CT Abdomen and Pelvis w/ Oral Cont (05.04.22 @ 14:53) >  Worsening small bowel obstruction with slight increase in dilatation.   Increasing ascites.    Developing small bilateral pleural effusions.    < end of copied text >      Advanced directives addressed: full resuscitation

## 2022-05-10 NOTE — PROGRESS NOTE ADULT - ASSESSMENT
89 yo male with hx of colon ca resection ~ 15 years ago with YAMINI setting of SBO.    YAMINI postop  ATN w hypotension   Cr stable, keep even with ppn with no intake   no ACE or ARB     no IV contrast   No NSAID's    Hypernatremia    can stop D5W for now  , free water po as able   adjust PPN per Surgery/Nutrition - hypotonic fluids       Hypercalcemia - suspected primary HPT    ,  avoid thiazide diuretics   Ca stable     SBO   per Surgery

## 2022-05-10 NOTE — PROGRESS NOTE ADULT - ASSESSMENT
89M hx colon ca s/p resection in 2004 here with severe abdominal  pain. Symptoms started 2days ago and got worse.  Mild nausea, no vomiting/diarrhea/constipation. No fever/chills. No clear precipitating factor. Mild tenderness "but it also hurts when I'm not touching it". Claims he had a BM yesterday. Has not really been eating due to pain. The patient had ventral hernia repair in July 2021. CT abd shows high grade distal SBO.       A/P  SBO  YAMINI on CKD III due to nausea/vomiting and decreased PO intake POA and then Resolved then subsequent recurrence with post op YAMINI likely due to hypovolemia/hypotension  Leukocytosis not present on admission. likely post op/reactive  Anemia likely hemodilutional + blood loss related to surgery. More likely hemodilutional.   Hypercalcmemia (multifactorial: dehydration + primary hyperparathyroidism)  HTN  BPH  SSS s/p PPM  toxic metabolic encephalopathy multifactorial   urinary retention and decreased urinary output  hypernatremia    Plan:  -admitted to surgery  - PPN  -He remains stable from a cardiac standpoint with improving renal function and stable BP.   D/James tele so he can get to a quieter floor which may help with his confusion. Now on 1N   - On BB ARB amlodipine   - on empiric abx coverage post op due to risk of colonic bacterial translocation  - Na better and dced D5W - rec lasix 20IV once and check CXR in AM   - start Flomax    Ty for consult, pls call with q        89M hx colon ca s/p resection in 2004 here with severe abdominal  pain. Symptoms started 2days ago and got worse.  Mild nausea, no vomiting/diarrhea/constipation. No fever/chills. No clear precipitating factor. Mild tenderness "but it also hurts when I'm not touching it". Claims he had a BM yesterday. Has not really been eating due to pain. The patient had ventral hernia repair in July 2021. CT abd shows high grade distal SBO.       A/P  SBO  YAMINI on CKD III due to nausea/vomiting and decreased PO intake POA and then Resolved then subsequent recurrence with post op YAMINI likely due to hypovolemia/hypotension  Leukocytosis not present on admission. likely post op/reactive  Anemia likely hemodilutional + blood loss related to surgery. More likely hemodilutional.   Hypercalcmemia (multifactorial: dehydration + primary hyperparathyroidism)  HTN  BPH  SSS s/p PPM  toxic metabolic encephalopathy multifactorial   urinary retention and decreased urinary output  hypernatremia    Plan:  -admitted to surgery  - PPN  -He remains stable from a cardiac standpoint with improving renal function and stable BP.   D/James tele so he can get to a quieter floor which may help with his confusion. Now on 1N   - On BB ARB amlodipine   - on empiric abx coverage post op due to risk of colonic bacterial translocation  - Na better and dced D5W - noted few bibasilar crackles on examn - rec lasix (for now okay to hold given recent YAMINI - watch off IVFs)   and will check CXR in AM   - start Flomax    Ty for consult, pls call with q

## 2022-05-10 NOTE — PROGRESS NOTE ADULT - SUBJECTIVE AND OBJECTIVE BOX
Feels well, offers no complaints  VSS afeb    lungs- scattered rhonchi  Cor- RRR  Abd- + BS soft non tender  Ext- trace UE edema

## 2022-05-10 NOTE — CHART NOTE - NSCHARTNOTEFT_GEN_A_CORE
Clinical Nutrition PPN Follow Up Note    *89 yo male admitted with high grad distal SBO, YAMINI on CKD now s/p ex lap on 5/5.  hypercalcemia 2/2 dehydration and primary hyperparathyroidism. Pt started on PPN on 5/5 2/2 SBO and extended time NPO.    *5/9: As per surgery on 5/6: "s/p Expl lap, SBR for high grade SBO. Pt likely volume depleted with low urine output, elevated CR. Will bolus IVF to treat low UO."  Nephrology is following. Pt transferred to CICU for closer monitoring.   NGT pulled out on 5/5, pt refused replacement. Urine output improving. S/P SLP evaluation with recommendations regular consistency with thin liquids when medically feasible    *current status: pt being followed by nephrology, was provided with D5W for hypernatremia, will decrease sodium in PN bag.  hypercalcemia 2/2 primary HPT.     *labs reviewed; 05-10; hypophosphatemia, magnesium at low end, will increase in PN bag.  potassium and sodium WNL.  bilirubin total WNL.  triglycerides WNL for lipids.  POCT WNL.    142  |  114<H>  |  53<H>  ----------------------------<  126<H>  3.8   |  22  |  1.40<H>    Ca    9.2      10 May 2022 04:12  Phos  2.4     05-10  Mg     1.9     05-10    TPro  4.9<L>  /  Alb  1.7<L>  /  TBili  0.3  /  DBili  x   /  AST  21  /  ALT  19  /  AlkPhos  72  05-09    Lipid Panel: Date/Time: 05-06-22 @ 05:12  Triglycerides, Serum: 98  POCT Blood Glucose.: 127 mg/dL (10 May 2022 05:01)  POCT Blood Glucose.: 135 mg/dL (10 May 2022 00:05)      *pertinent meds: Dextrose 5% + NaCl 0.45% w/ KCl 20 mEq IV, metoprolol, Zofran, prochlorperazine     *I&O's Detail    09 May 2022 07:01  -  10 May 2022 07:00  --------------------------------------------------------  IN:  Total IN: 0 mL    OUT:    Intermittent Catheterization - Urethral (mL): 600 mL  Total OUT: 600 mL    Total NET: -600 mL      * fluid status: negative net volume. no PN documented.  will monitor and adjust PN prn.  *BM (+) 5/10.      *Mahesh Score of 12; stg 2 PI on sacrum documented. (+3) Lt/Rt arm edema documented.    *Malnutrition: Pt continues to meet criteria for severe malnutrition in acute on chronic illness r/t decreased ability to consume sufficient nutr 2/2 SBO on CA AEB meeting <50% of ENN x 7 days, severe muscle/ fat wasting    Estimated Needs: based on 55Kg (wt from 5/5, bedscale wt obtained by RD)  Calories: 6531-5416 Kcal (30-35 Kcal/Kg)  Protein:   g protein (1.5-2g/Kg)  Fluids:  6686-3902 mL (30-35mL/Kg)    Diet, NPO (05-05-22 @ 19:40) [Active]    Weight History: no significant changes x 2 days (admission weight may be inaccurate as there is a large discrepancy)   66kg (5/8)  66Kg (5/7)  Height (cm): 175.3 (05-01-22 @ 08:41)  Weight (kg): 54.4 (05-01-22 @ 08:41)  BMI (kg/m2): 17.7 (05-01-22 @ 08:41)  BSA (m2): 1.66 (05-01-22 @ 08:41)    PPN Recommendations: via peripheral line  Total Volume: 2200 mL    95 g  Amino Acids  100 g Dextrose  50 g Lipids 20%  102 mEq Sodium Chloride  0 mEq Sodium Acetate  20 mmol Sodium Phosphate  22 mEq Potassium Chloride  21 mEq Potassium Acetate  25 mmol Potassium Phosphate  0 mEq Calcium Gluconate  12 mEq Magnesium Sulfate  100 mg Thiamine  1 ml Trace Elements  10 ml MVI    Total Calories  1278 Kcal    (Meets  77%  of  Estimated Energy needs  and  100% LOW-END Protein needs)     (osmolarity ~864)    Additional Recommendations:  1) Daily weights  2) Strict I & O's  3) Daily lyte checks including magnesium and phos  4) Weekly triglycerides/LFT checks  5) POCT q6hrs; maintain 140-180mg/dL  6) Monitor BM's     *will monitor and adjust treatement plan prn*  Tamera Smith MA, RDN, CDN, [facility] [phone]

## 2022-05-10 NOTE — PROGRESS NOTE ADULT - SUBJECTIVE AND OBJECTIVE BOX
CC:Patient is a 89y old  Male who presents with a chief complaint of abdominal pain   89M hx colon ca s/p resection ~15y ago here with mid-epigastric pain x 2d. Mild nausea, no vomiting/diarrhea/constipation. No fever/chills. No clear precipitating factor. Mild tenderness "but it also hurts when I'm not touching it". Claims he had a BM yesterday. Has not really been eating due to pain. The patient had ventral hernia repair in July 2021.     Subjective:   5/2: Abdominal Pain improved. No nausea or vomiting. Passing a little flatus., Denies fever, chills, dizziness, chest pain, SOB  5/3: Abdominal pain similar to yesterday NGT failure/removal overnight. No nausea. Passing a little flatus. Still NPO and on IVF.   5/4: Abdominal pain unchanged; Off NGT; Repeat CT ->worsening SBO and ascites. Afebrile and hemodynamically stable.   5/5: more distended; more pain and more nausea. Planned for ex lap today.   5/6: s/p ex lap. Post op hypotension and rise in Cr. IVF boluses ordered to maintain MAP. Transferred to CICU for closer monitoring.   5/7: improved BP. Improved Cr. Improved Urine output. Pain controlled. No nausea. Intermittent confusion but easily re-oriented.   5/8: in restraints, not in acute distress   5/9: more alert  5/10 -seen in the morning,  alert,  knows his name, states he wants to sleep some more. denies abdo pain. ROS limited due to underlying dementia/delirium but he does not appear to be in acute distress       Vital Signs Last 24 Hrs  T(F): 98.4 (10 May 2022 15:29), Max: 100 (09 May 2022 21:24)  HR: 85 (10 May 2022 15:29) (51 - 96)  BP: 123/39 (10 May 2022 15:29) (123/39 - 156/88)  RR: 17 (10 May 2022 15:29) (16 - 18)  SpO2: 91% (10 May 2022 15:29) (90% - 97%)  Constitutional: alert confused  HEENT:  EOMI, PERRLA; Dry mucous membranes  Neck: supple  Back: no tenderness  Respiratory: few crackles lung bases  Cardiovascular: S1S2 regular, no murmurs  Abdomen: appropriately tenders soft; ; hypoactive bowel sounds; Midline surgical incision site with soft surgical dressing; clean and dry  : Kc   Musculoskeletal: no muscle tenderness, no joint swelling or tenderness  Extremities: no pedal edema  Neurological: AxOx2, moving all extremities, no focal deficits  Skin:  no rash    LABS: All Labs Reviewed:                      9.9    8.49  )-----------( 48       ( 10 May 2022 04:12 )             30.8   142  |  114<H>  |  53<H>  ----------------------------<  126<H>  3.8   |  22  |  1.40<H>  TPro  4.9<L>  /  Alb  1.7<L>  /  TBili  0.3  /  DBili  x   /  AST  21  /  ALT  19  /  AlkPhos  72  05-09    RAD:  < from: US Duplex Venous Upper Ext Ltd, Right (05.08.22 @ 13:31) >  No evidence of right upper extremity deep venous thrombosis.  Extensive subcutaneous edema.    < from: CT Abdomen and Pelvis w/ Oral Cont (05.04.22 @ 14:53) >  Worsening small bowel obstruction with slight increase in dilatation.   Increasing ascites.  Developing small bilateral pleural effusions.      MEDS:   amLODIPine   Tablet 5 milliGRAM(s) Oral daily  cefoTEtan  IVPB 1 Gram(s) IV Intermittent every 12 hours  chlorhexidine 4% Liquid 1 Application(s) Topical <User Schedule>  fat emulsion (Fish Oil and Plant Based) 20% Infusion 0.92 Gm/kG/Day IV Continuous <Continuous>  losartan 12.5 milliGRAM(s) Oral daily  metoprolol tartrate 25 milliGRAM(s) Oral two times a day  ondansetron Injectable 4 milliGRAM(s) IV Push every 8 hours PRN  Parenteral Nutrition - Adult 1 Each TPN Continuous <Continuous>  Parenteral Nutrition - Adult 1 Each TPN Continuous <Continuous>  prochlorperazine   Injectable 10 milliGRAM(s) IntraMuscular every 8 hours PRN  silver sulfADIAZINE 1% Cream 1 Application(s) Topical daily  tamsulosin 0.4 milliGRAM(s) Oral at bedtime

## 2022-05-11 NOTE — PROGRESS NOTE ADULT - SUBJECTIVE AND OBJECTIVE BOX
CC: Patient is a 89y old  Male who presents with a chief complaint of abdominal pain   89M hx colon ca s/p resection ~15y ago here with mid-epigastric pain x 2d. Mild nausea, no vomiting/diarrhea/constipation. No fever/chills. No clear precipitating factor. Mild tenderness "but it also hurts when I'm not touching it". Claims he had a BM yesterday. Has not really been eating due to pain. The patient had ventral hernia repair in July 2021.     Subjective:   5/2: Abdominal Pain improved. No nausea or vomiting. Passing a little flatus., Denies fever, chills, dizziness, chest pain, SOB  5/3: Abdominal pain similar to yesterday NGT failure/removal overnight. No nausea. Passing a little flatus. Still NPO and on IVF.   5/4: Abdominal pain unchanged; Off NGT; Repeat CT ->worsening SBO and ascites. Afebrile and hemodynamically stable.   5/5: more distended; more pain and more nausea. Planned for ex lap today.   5/6: s/p ex lap. Post op hypotension and rise in Cr. IVF boluses ordered to maintain MAP. Transferred to CICU for closer monitoring.   5/7: improved BP. Improved Cr. Improved Urine output. Pain controlled. No nausea. Intermittent confusion but easily re-oriented.   5/8: in restraints, not in acute distress   5/9: more alert  5/10 -seen in the morning,  alert,  knows his name, states he wants to sleep some more. denies abdo pain. ROS limited due to underlying dementia/delirium but he does not appear to be in acute distress   5/11 - seen this morning - shortness of breath, on NRM - gave lasix upgraded to CICU for bipap      Vital Signs Last 24 Hrs  T(F): 97.7 (11 May 2022 08:54), Max: 99 (10 May 2022 21:14)  HR: 80 (11 May 2022 19:00) (66 - 102)  BP: 133/104 (11 May 2022 19:00) (100/59 - 138/107)  BP(mean): 111 (11 May 2022 19:00) (66 - 113)  RR: 24 (11 May 2022 19:00) (16 - 32)  SpO2: 91% (11 May 2022 19:00) (86% - 97%)  Constitutional: alert confused  HEENT:  EOMI, PERRLA; Dry mucous membranes  Neck: supple  Back: no tenderness  Respiratory: few crackles lung bases  Cardiovascular: S1S2 regular, no murmurs  Abdomen: appropriately tenders soft; ; hypoactive bowel sounds; Midline surgical incision site with soft surgical dressing; clean and dry  : Kc   Musculoskeletal: no muscle tenderness, no joint swelling or tenderness  Extremities: no pedal edema  Neurological: AxOx2, moving all extremities, no focal deficits  Skin:  no rash      RAD:  < from: US Duplex Venous Upper Ext Ltd, Right (05.08.22 @ 13:31) >  No evidence of right upper extremity deep venous thrombosis.  Extensive subcutaneous edema.    < from: CT Abdomen and Pelvis w/ Oral Cont (05.04.22 @ 14:53) >  Worsening small bowel obstruction with slight increase in dilatation.   Increasing ascites.  Developing small bilateral pleural effusions.    LABS: All Labs Reviewed:                      11.4   17.44 )-----------( 116      ( 11 May 2022 07:03 )             34.4   141  |  114<H>  |  49<H>  ----------------------------<  135<H>  4.1   |  18<L>  |  1.39<H>  Ca    9.6      11 May 2022 05:40  Phos  3.4     05-11  Mg     2.1     05-11  TPro  5.4<L>  /  Alb  1.7<L>  /  TBili  0.4  /  DBili  x   /  AST  35  /  ALT  25  /  AlkPhos  104  05-11      MEDS:   chlorhexidine 4% Liquid 1 Application(s) Topical <User Schedule>  meropenem  IVPB 1000 milliGRAM(s) IV Intermittent every 12 hours  metoprolol tartrate Injectable 5 milliGRAM(s) IV Push every 6 hours  ondansetron Injectable 4 milliGRAM(s) IV Push every 8 hours PRN  Parenteral Nutrition - Adult 1 Each TPN Continuous <Continuous>  prochlorperazine   Injectable 10 milliGRAM(s) IntraMuscular every 8 hours PRN  silver sulfADIAZINE 1% Cream 1 Application(s) Topical daily

## 2022-05-11 NOTE — PROVIDER CONTACT NOTE (CHANGE IN STATUS NOTIFICATION) - BACKGROUND
Patient has hx of Colon CA, admitted with SBO and had an exploratory lap open bowel resection on 5/5. Patient went into YAMINI and was hypotensive post operatively.

## 2022-05-11 NOTE — CONSULT NOTE ADULT - SUBJECTIVE AND OBJECTIVE BOX
CHIEF COMPLAINT: Patient is a 89y old  Male who presents with a chief complaint of abdominal pain (11 May 2022 14:27)      HPI:  89M hx colon ca s/p resection ~15y ago here with mid-epigastric pain x 2d. Mild nausea, no vomiting/diarrhea/constipation. No fever/chills. No clear precipitating factor. Mild tenderness "but it also hurts when I'm not touching it". Claims he had a BM yesterday. Has not really been eating due to pain. (01 May 2022 13:06)    5/2-patient known to me; in for SBO; during w/u , placed on telemetry due to need for IV beta blockers to control BP.  Patient admitted for bowel rest and decompression and consideration for surgery if no improvement in symptoms.  Cardiac issues are complete heart block with PPM, no active CAD and recent stress testing with no ischemia.  EKG with A sensing, V pacing.   CXR with left lower lung.   Now patient is s/p surgery with most recent cardiac w/u negative for ischemia; patient is 89 and with acute GI issue; will get ECHO but no further cardiac evaluation needed.  Patient is moderate risk due to age and acuity but on maximal medical therapy; if surgery needed, may go    5/11-Patient has had worsening shortness of breath in last few hours, is on oxygen via 100%NRB; had rapid response earlier today for respiratory distress.  Had respiratory distress earlier; possibly related to post op pneumonia, on supplemental oxygen  and  at risk for aspiration with antibiotics chnged to meropenem to cover for pneumonia as well as bowel justina.  CXR and ECHO ordered and BNP over 5000 and CT with bilateral patchy upper lobe infiltrates concerning for pneumonia and increased bilateral pleural effusion.   WBC over 17K and was aggressively hydrated when hypotensive post op with rising creatinine.        PMHx: PAST MEDICAL & SURGICAL HISTORY:  HTN (hypertension)      BPH (benign prostatic hyperplasia)      Ventral hernia      Colon cancer  h/o chemo 2004      Heart block      Pacemaker      HLD (hyperlipidemia)      H/O colectomy  2004      History of carpal tunnel surgery of left wrist      H/O hernia repair      History of total hip replacement, bilateral      History of appendectomy  ruptured      Cardiac pacemaker      H/O ventral hernia repair            Soc Hx:       Allergies: Allergies    amoxicillin (Other (Severe))  penicillin (Unknown)    Intolerances          REVIEW OF SYSTEMS:    CONSTITUTIONAL: fevers or chills  EYES/ENT: No visual changes;  No vertigo or throat pain   NECK: No pain or stiffness  RESPIRATORY: cough, wheezing, shortness of breath  CARDIOVASCULAR: No chest pain or palpitations  GASTROINTESTINAL: No abdominal or epigastric pain. No nausea, vomiting, or hematemesis; No diarrhea or constipation. No melena or hematochezia.  GENITOURINARY: No dysuria, frequency or hematuria      Vital Signs Last 24 Hrs  T(C): 36.5 (11 May 2022 08:54), Max: 37.2 (10 May 2022 21:14)  T(F): 97.7 (11 May 2022 08:54), Max: 99 (10 May 2022 21:14)  HR: 85 (11 May 2022 15:00) (66 - 102)  BP: 100/59 (11 May 2022 15:00) (100/59 - 138/107)  BP(mean): 66 (11 May 2022 15:00) (66 - 113)  RR: 25 (11 May 2022 15:00) (16 - 32)  SpO2: 97% (11 May 2022 15:00) (86% - 97%)    I&O's Summary    10 May 2022 07:01  -  11 May 2022 07:00  --------------------------------------------------------  IN: 0 mL / OUT: 1300 mL / NET: -1300 mL        CAPILLARY BLOOD GLUCOSE      POCT Blood Glucose.: 167 mg/dL (11 May 2022 07:54)  POCT Blood Glucose.: 159 mg/dL (11 May 2022 06:53)  POCT Blood Glucose.: 125 mg/dL (10 May 2022 22:33)  POCT Blood Glucose.: 117 mg/dL (10 May 2022 17:31)      PHYSICAL EXAM:   Constitutional: NAD, awake and alert, well-developed  HEENT: PERR, EOMI, Normal Hearing, MMM  Neck: JVD  Respiratory: Breath sounds rales or rhonchi  Cardiovascular: S1 and S2, regular rate and rhythm,  Murmurs  Gastrointestinal: Bowel Sounds present, soft, nontender, nondistended, no guarding, no rebound  Extremities: No peripheral edema  Vascular: 2+ peripheral pulses      MEDICATIONS:  MEDICATIONS  (STANDING):  chlorhexidine 4% Liquid 1 Application(s) Topical <User Schedule>  meropenem  IVPB 1000 milliGRAM(s) IV Intermittent every 12 hours  metoprolol tartrate Injectable 5 milliGRAM(s) IV Push every 6 hours  Parenteral Nutrition - Adult 1 Each (92 mL/Hr) TPN Continuous <Continuous>  silver sulfADIAZINE 1% Cream 1 Application(s) Topical daily      LABS: All Labs Reviewed:                        11.4   17.44 )-----------( 116      ( 11 May 2022 07:03 )             34.4     05-11    141  |  114<H>  |  49<H>  ----------------------------<  135<H>  4.1   |  18<L>  |  1.39<H>    Ca    9.6      11 May 2022 05:40  Phos  3.4     05-11  Mg     2.1     05-11    TPro  5.4<L>  /  Alb  1.7<L>  /  TBili  0.4  /  DBili  x   /  AST  35  /  ALT  25  /  AlkPhos  104  05-11          Serum Pro-Brain Natriuretic Peptide: 5169 pg/mL (05-11 @ 08:22)    Blood Culture: Organism --  Gram Stain Blood -- Gram Stain --  Specimen Source .Blood None  Culture-Blood --    Organism --  Gram Stain Blood -- Gram Stain --  Specimen Source .Blood None  Culture-Blood --      lipid profile       RADIOLOGY:  IMPRESSION:  Interval development of bilateral patchy opacities in the lungs most   pronounced in the upper lobes raising concern for infection. Interval   developmentof atelectasis of the right lower lobe and increased partial   atelectasis left lower lobe. Moderate bilateral pleural effusions have   increased.    Pneumoperitoneum is presumably postoperative. Resolution of previously   visualized small bowel obstruction. Limited evaluation for bowel wall   thickening without contrast.    8.0 x 6.2 cm lobular cyst extending off the right kidney laterally and   inferiorly. Regions measuring soft tissue in density. A cystic neoplasm   cannot entirely be excluded. Patient currently cannot receive iodinated   contrast due to renal failure. Recommend ultrasound for further   evaluation.    EKG:    Telemetry:    ECHO:   Estimated left ventricular ejection fraction is 55 %.   The left ventricle is normal in size, wall thickness, wall motion and   contractility as seen in limited views.   The left ventricular wall motion appears hyperdynamic.   Prominent chordal calcification seen.   Normal appearing right atrium.   A device wire is seen in the RV and RA.   The aortic valve is well visualized, appears mildly sclerotic. Valve   opening seems to be mildly restricted.   The mitralvalve leaflets appear thickened.   Mild (1+) mitral regurgitation is present.   EA reversal of the mitral inflow consistent with reduced compliance of   the left ventricle.   The tricuspid valve leaflets appear mildly thickened and/or calcified,   but open well.   Mild (1+) tricuspid valve regurgitation is present.   Mild pulmonary hypertension.

## 2022-05-11 NOTE — PROGRESS NOTE ADULT - SUBJECTIVE AND OBJECTIVE BOX
Pt awake, alert. On BiPap., denies pain    VSS    lungs- scattered rhonchi  Cor- RRR  Abd- +BS soft non tender  Ext- no edema

## 2022-05-11 NOTE — PROVIDER CONTACT NOTE (OTHER) - ACTION/TREATMENT ORDERED:
NS at 80ml/hr
ordered to remove NGT. surgery to follow up in AM
MD aware and saw site. ok to continue iv' s.
replace NGT
Order to Insert Kc Catheter
Give nonrebreather, will  monitor labs this am and will reeval

## 2022-05-11 NOTE — CHART NOTE - NSCHARTNOTEFT_GEN_A_CORE
Clinical Nutrition PPN Follow Up Note    *89 yo male admitted with high grad distal SBO, YAMINI on CKD now s/p ex lap on 5/5.  hypercalcemia 2/2 dehydration and primary hyperparathyroidism. Pt started on PPN on 5/5 2/2 SBO and extended time NPO.    *5/9: As per surgery on 5/6: "s/p Expl lap, SBR for high grade SBO. Pt likely volume depleted with low urine output, elevated CR. Will bolus IVF to treat low UO."  Nephrology is following. Pt transferred to CICU for closer monitoring.   NGT pulled out on 5/5, pt refused replacement. Urine output improving. S/P SLP evaluation with recommendations regular consistency with thin liquids when medically feasible    *5/10: pt being followed by nephrology, was provided with D5W for hypernatremia, will decrease sodium in PN bag.  hypercalcemia 2/2 primary HPT.     *current status: S/P RRT on 5/10 for hypoxia; encephalopathy 2/2 hypoxia; on venti-mask, pt now NPO.  CT chest shows consistent with b/l infiltrates.  worsening leukocytosis.  d/w Surgery PA, PN on hold for 5/10 and 5/11, lasix being given.    *of note, pt had urine output of 1300mL plus 1 undocumented urine episode with 4 episodes of diarrhea in the past 24hours.  (+1) Lt/Rt ankle, (+2) Lt/Rt arm edema documented.  *pt was infused IVF at 80mL/hr on 5/8, 5/9, and 5/10 (~1900mL additional fluid volume).    *labs reviewed; 05-11; lytes WNL (sodium still at upper end normal).  corrected Ca elevated.  bilirubin total WNL. POCT WNL.    141  |  114<H>  |  49<H>  ----------------------------<  135<H>  4.1   |  18<L>  |  1.39<H>    Ca    9.6      11 May 2022 05:40  Phos  3.4     05-11  Mg     2.1     05-11    TPro  5.4<L>  /  Alb  1.7<L>  /  TBili  0.4  /  DBili  x   /  AST  35  /  ALT  25  /  AlkPhos  104  05-11      Lipid Panel: Date/Time: 05-06-22 @ 05:12  Triglycerides, Serum: 98  POCT Blood Glucose.: 167 mg/dL (11 May 2022 07:54)  POCT Blood Glucose.: 159 mg/dL (11 May 2022 06:53)  POCT Blood Glucose.: 125 mg/dL (10 May 2022 22:33)  POCT Blood Glucose.: 117 mg/dL (10 May 2022 17:31)  POCT Blood Glucose.: 119 mg/dL (10 May 2022 11:32)      *pertinent meds:  zofran    *I&O's Detail    10 May 2022 07:01  -  11 May 2022 07:00  --------------------------------------------------------  IN:  Total IN: 0 mL    OUT:    Indwelling Catheter - Urethral (mL): 1300 mL  Total OUT: 1300 mL    Total NET: -1300 mL    * fluid status: negative net volume. no PN or IVF documented.  will monitor and adjust PN prn.  *BM (+) 5/10 x 4 episodes.      *Mahesh Score of 11; stg 1 PI on sacrum documented. (+2) Lt/Rt arm, (+1) Lt/Rt ankle edema documented.    *Malnutrition: Pt continues to meet criteria for severe malnutrition in acute on chronic illness r/t decreased ability to consume sufficient nutr 2/2 SBO on CA AEB meeting <50% of ENN x 7 days, severe muscle/ fat wasting    Estimated Needs: based on 55Kg ([facility]  Calories: 3037-3428 Kcal (30-35 Kcal/Kg)  Protein:   g protein (1.5-2g/Kg)  Fluids:  6385-1135 mL (30-35mL/Kg)    Diet, Regular (05-10-22 @ 15:24) [Active]    Weight History: no significant changes x 2 days (admission weight may be inaccurate as there is a large discrepancy)   66kg (5/8)  66Kg (5/7)  Height (cm): 175.3 (05-01-22 @ 08:41)  Weight (kg): 54.4 (05-01-22 @ 08:41)  BMI (kg/m2): 17.7 (05-01-22 @ 08:41)  BSA (m2): 1.66 (05-01-22 @ 08:41)    Recommendations:  1) if able to have PO diet, add ensure enlive and encourage  2) daily wt checks to track/trend changes  3) strict I/O's    *will monitor and adjust treatement plan prn*  Tamera Smith MA, RDN, CDN, CNSC [phone]

## 2022-05-11 NOTE — PROGRESS NOTE ADULT - SUBJECTIVE AND OBJECTIVE BOX
Events noted. Pt now being treated for pneumonia    VSS afeb    Lungs- diminished BS L greater than Right  Cor- RRR  Abd- + BS soft  non tender  Ext- no edema

## 2022-05-11 NOTE — PROVIDER CONTACT NOTE (OTHER) - BACKGROUND
Patient with a hx of colon cancer admitted d/t SBO, on 5/5 had a Exploratory lap to open bowel resection which resulted in post op YAMINI, hypotensive and low urine output. On 5/10 noticed to be crackles
patient has small bowel obstruction.
Patient came in d/t SBO, on 5/2 had exp lap  with bowel resection
intake aprox 600ml since Kc removal
patient admittted with small bowel obstruction. NGT removed by patient yesterday. replaced by surgery.
Attending Attestation (For Attendings USE Only)...

## 2022-05-11 NOTE — PROVIDER CONTACT NOTE (OTHER) - ASSESSMENT
Bladder scan 590mL. Had already had to straight caths before
did  not void
patient VSS. no distress noted. PA notified
nut no dieretic was given d/t YAMINI. Currently is scheduled for a CXR in the morning
patient denies pulling out NGT. part of NGT visible in mouth.
+pulses radially, IV sites look clear, swelling is below iv sites.

## 2022-05-11 NOTE — PROGRESS NOTE ADULT - SUBJECTIVE AND OBJECTIVE BOX
RAPID RESPONSE CALLED FOR ACUTE HYPOXIA, 89M POD # 5 s/p ex lap w/ SBR for high grade SBO evaluated at bedside this morning, RRT present at bedside, pt w/ AMS, lethargy and hypoxia noted on pulse oximeter. Pt placed on non-rebreather, 40mgs lasix ordered IVP, O2 sat came up to 96% on non-rebreather, CXR not officially read but looks c/w volume overload, Pt arousable to verbal stimuli, but drowsy when awake, not verbalizing any complaints      PMH/PSH:PAST MEDICAL & SURGICAL HISTORY:  HTN (hypertension)  BPH (benign prostatic hyperplasia)  Ventral hernia  Colon cancer  h/o chemo 2004  Heart block  Pacemaker  HLD (hyperlipidemia)  H/O colectomy 2004  History of carpal tunnel surgery of left wrist  H/O hernia repair  History of total hip replacement, bilateral  History of appendectomy  ruptured  Cardiac pacemaker  H/O ventral hernia repair          Allergies:  amoxicillin (Other (Severe))  penicillin (Unknown)      Medications:  amLODIPine   Tablet 5 milliGRAM(s) Oral daily  cefoTEtan  IVPB 1 Gram(s) IV Intermittent every 12 hours  chlorhexidine 4% Liquid 1 Application(s) Topical <User Schedule>  fat emulsion (Fish Oil and Plant Based) 20% Infusion 0.92 Gm/kG/Day IV Continuous <Continuous>  losartan 12.5 milliGRAM(s) Oral daily  metoprolol tartrate 25 milliGRAM(s) Oral two times a day  ondansetron Injectable 4 milliGRAM(s) IV Push every 8 hours PRN  Parenteral Nutrition - Adult 1 Each TPN Continuous <Continuous>  prochlorperazine   Injectable 10 milliGRAM(s) IntraMuscular every 8 hours PRN  silver sulfADIAZINE 1% Cream 1 Application(s) Topical daily  tamsulosin 0.4 milliGRAM(s) Oral at bedtime      REVIEW OF SYSTEMS:  All other review of systems is negative unless indicated above.    Relevant Family History:   FAMILY HISTORY:      Relevant Social History:  Denies ETOH or tobacco history    Physical Exam:    Vital Signs:  Vital Signs Last 24 Hrs  T(C): 37.1 (11 May 2022 06:40), Max: 37.2 (10 May 2022 21:14)  T(F): 98.8 (11 May 2022 06:40), Max: 99 (10 May 2022 21:14)  HR: 80 (11 May 2022 06:40) (80 - 102)  BP: 132/76 (11 May 2022 06:40) (112/79 - 156/88)  BP(mean): --  RR: 26 (11 May 2022 06:40) (16 - 26)  SpO2: 92% (11 May 2022 06:40) (88% - 94%)  Daily     Daily     Constitutional: lethargic  HEENT: NC/AT, PERRL  Neck: supple; no JVD or thyromegaly  Respiratory: crackles in mid/lower lung fields, in mild/mod respiratory distress, no accessory muscle use  Cardiac: +S1/S2  Gastrointestinal: soft, NT/ND; no rebound or guarding; +BS   Extremities: no pedal/ankle/pretibial edema  Vascular: 2+ radial, femoral, DP/PT pulses B/L  Skin: warm  Neurologic: CNS grossly intact    Laboratory:                          11.4   17.44 )-----------( 116      ( 11 May 2022 07:03 )             34.4     05-11    141  |  114<H>  |  49<H>  ----------------------------<  135<H>  4.1   |  18<L>  |  1.39<H>    Ca    9.6      11 May 2022 05:40  Phos  3.4     05-11  Mg     2.1     05-11    TPro  5.4<L>  /  Alb  1.7<L>  /  TBili  0.4  /  DBili  x   /  AST  35  /  ALT  25  /  AlkPhos  104  05-11    LIVER FUNCTIONS - ( 11 May 2022 05:40 )  Alb: 1.7 g/dL / Pro: 5.4 gm/dL / ALK PHOS: 104 U/L / ALT: 25 U/L / AST: 35 U/L / GGT: x                   Intake and Output    05-10-22 @ 07:01  -  05-11-22 @ 07:00  --------------------------------------------------------  IN: 0 mL / OUT: 1300 mL / NET: -1300 mL        Imaging:

## 2022-05-11 NOTE — PROGRESS NOTE ADULT - ASSESSMENT
89M hx colon ca s/p resection in 2004 here with severe abdominal  pain. Symptoms started 2days ago and got worse.  Mild nausea, no vomiting/diarrhea/constipation. No fever/chills. No clear precipitating factor. Mild tenderness "but it also hurts when I'm not touching it". Claims he had a BM yesterday. Has not really been eating due to pain. The patient had ventral hernia repair in July 2021. CT abd shows high grade distal SBO.       A/P  SBO  YAMINI on CKD III due to nausea/vomiting and decreased PO intake POA and then Resolved then subsequent recurrence with post op YAMINI likely due to hypovolemia/hypotension improved  Now with Acute Hypoxic resp failure like to fluid overload, and acute diastolic CHF   Leukocytosis  post operative - risk of aspiration pneumonia, bacterial translocation in the gut   Anemia likely hemodilutional + blood loss related to surgery. More likely hemodilutional.   Hypercalcemia (multifactorial: dehydration + primary hyperparathyroidism)  HTN  BPH  SSS s/p PPM  toxic metabolic encephalopathy multifactorial   urinary retention and decreased urinary output  hypernatremia    Plan:  -admitted to surgery, originally on tele, then 1N, now being monitored on CICU  - Pt was seen on 1N , in acute hypoxic resp failure, stopped PPN, gave lasix, is on 100 NRM so placed in CICU for trial of BIPAP as tolerated  - low doses of lasix as BP and Cr allow, Cardiology recs noted - consider farxiag/jardiance   - on empiric abx coverage post op due to risk of colonic bacterial translocation and possible aspiration PNA - appreciate ID help - change to meropenem   - CT Chest reviewed - biateral infiltrates/pulm edema   - Normally on BB ARB amlodipine - now NPO and will place on IV BB alone   -  Flomax when able to take PO   - would hold PO diet for now until resp and mental status improve   - discussed with Pulm and ICU PA and Surgery     Ty for consult, pls call with q        89M hx colon ca s/p resection in 2004 here with severe abdominal  pain. Symptoms started 2days ago and got worse.  Mild nausea, no vomiting/diarrhea/constipation. No fever/chills. No clear precipitating factor. Mild tenderness "but it also hurts when I'm not touching it". Claims he had a BM yesterday. Has not really been eating due to pain. The patient had ventral hernia repair in July 2021. CT abd shows high grade distal SBO.       A/P  SBO  YAMINI on CKD III due to nausea/vomiting and decreased PO intake POA and then Resolved then subsequent recurrence with post op YAMINI likely due to hypovolemia/hypotension improved  Now with Acute Hypoxic resp failure like to fluid overload, and acute diastolic CHF   Leukocytosis  post operative - risk of aspiration pneumonia, bacterial translocation in the gut   Anemia likely hemodilutional + blood loss related to surgery. More likely hemodilutional.   Hypercalcemia (multifactorial: dehydration + primary hyperparathyroidism)  HTN  BPH  SSS s/p PPM  toxic metabolic encephalopathy multifactorial   urinary retention and decreased urinary output  hypernatremia    Plan:  -admitted to surgery, originally on tele, then 1N, now being monitored on CICU  - Pt was seen on 1N , in acute hypoxic resp failure, stopped PPN, gave lasix, is on 100 NRM so placed in CICU for trial of BIPAP as tolerated  - low doses of lasix as BP and Cr allow, Cardiology recs noted - consider farxiag/jardiance   - on empiric abx coverage post op due to risk of colonic bacterial translocation and possible aspiration PNA - appreciate ID help - change to meropenem   - CT Chest reviewed - biateral infiltrates/pulm edema   - Normally on BB ARB amlodipine - now NPO and will place on IV BB alone   -  Flomax when able to take PO   - would hold PO diet for now until resp and mental status improve   - discussed with Pulm and ICU PA and Surgery   - I had a conversation with Ana Galvez - we talked about CPR and ETT and I advised that at his age and with the comorbidities he would not benefit from it and should be allowed to die naturally. I also talked to her about the Palliative care team as this is the first conversation of this kind she has had and she is open to talking to Pall Care about GOC and other care needs that may arise - Pall Care consulted     Ty for consult, pls call with marleny

## 2022-05-11 NOTE — PROGRESS NOTE ADULT - ASSESSMENT
Post op pneumonia, s/p SBR. Severe malnutrition. Continue PPN. IV abx. CICU monitoring. Diet as tolerated when more stable in terms of oxygenation. Discussed with wife and son.

## 2022-05-11 NOTE — CONSULT NOTE ADULT - SUBJECTIVE AND OBJECTIVE BOX
HPI:  89M hx colon ca s/p resection ~15y ago here with mid-epigastric pain x 2d. Mild nausea, no vomiting/diarrhea/constipation. No fever/chills. No clear precipitating factor. Mild tenderness "but it also hurts when I'm not touching it". Claims he had a BM yesterday. Has not really been eating due to pain. (01 May 2022 13:06)      PAST MEDICAL & SURGICAL HISTORY:  HTN (hypertension)      BPH (benign prostatic hyperplasia)      Ventral hernia      Colon cancer  h/o chemo 2004      Heart block      Pacemaker      HLD (hyperlipidemia)      H/O colectomy  2004      History of carpal tunnel surgery of left wrist      H/O hernia repair      History of total hip replacement, bilateral      History of appendectomy  ruptured      Cardiac pacemaker      H/O ventral hernia repair          Home Medications:  Advil Dual Action With Acetaminophen 250 mg-125 mg oral tablet: 2 tab(s) orally once a day, As Needed (01 May 2022 21:57)  alfuzosin 10 mg oral tablet, extended release: 1 tab(s) orally once a day (01 May 2022 21:57)  amLODIPine 5 mg oral tablet: 1 tab(s) orally once a day (01 May 2022 21:57)  azelastine nasal: nasal once a day (01 May 2022 21:57)  hydroCHLOROthiazide 25 mg oral tablet: 1 tab(s) orally once a day (01 May 2022 21:57)  losartan 25 mg oral tablet: 1/2  tab(s) orally once a day (01 May 2022 21:57)  metoprolol succinate 25 mg oral tablet, extended release: 1 tab(s) orally once a day (01 May 2022 21:57)      MEDICATIONS  (STANDING):  amLODIPine   Tablet 5 milliGRAM(s) Oral daily  cefoTEtan  IVPB 1 Gram(s) IV Intermittent every 12 hours  chlorhexidine 4% Liquid 1 Application(s) Topical <User Schedule>  fat emulsion (Fish Oil and Plant Based) 20% Infusion 0.92 Gm/kG/Day (20.83 mL/Hr) IV Continuous <Continuous>  losartan 12.5 milliGRAM(s) Oral daily  metoprolol tartrate 25 milliGRAM(s) Oral two times a day  Parenteral Nutrition - Adult 1 Each (92 mL/Hr) TPN Continuous <Continuous>  silver sulfADIAZINE 1% Cream 1 Application(s) Topical daily  tamsulosin 0.4 milliGRAM(s) Oral at bedtime    MEDICATIONS  (PRN):  ondansetron Injectable 4 milliGRAM(s) IV Push every 8 hours PRN Nausea and/or Vomiting  prochlorperazine   Injectable 10 milliGRAM(s) IntraMuscular every 8 hours PRN Nasuea/Vomiting      Allergies    amoxicillin (Other (Severe))  penicillin (Unknown)    Intolerances        SOCIAL HISTORY: Denies tobacco, etoh abuse or illicit drug use    FAMILY HISTORY:      Vital Signs Last 24 Hrs  T(C): 36.5 (11 May 2022 08:54), Max: 37.2 (10 May 2022 21:14)  T(F): 97.7 (11 May 2022 08:54), Max: 99 (10 May 2022 21:14)  HR: 66 (11 May 2022 08:54) (66 - 102)  BP: 100/60 (11 May 2022 08:54) (100/60 - 136/74)  BP(mean): --  RR: 18 (11 May 2022 08:54) (16 - 26)  SpO2: 95% (11 May 2022 08:54) (88% - 95%)        REVIEW OF SYSTEMS:    CONSTITUTIONAL:  As per HPI.  HEENT:  Eyes:  No diplopia or blurred vision. ENT:  No earache, sore throat or runny nose.  CARDIOVASCULAR:  No pressure, squeezing, tightness, heaviness or aching about the chest, neck, axilla or epigastrium.  RESPIRATORY:  No cough, shortness of breath, PND or orthopnea.  GASTROINTESTINAL:  No nausea, vomiting or diarrhea.  GENITOURINARY:  No dysuria, frequency or urgency.  MUSCULOSKELETAL:  As per HPI.  SKIN:  No change in skin, hair or nails.  NEUROLOGIC:  No paresthesias, fasciculations, seizures or weakness.  PSYCHIATRIC:  No disorder of thought or mood.  ENDOCRINE:  No heat or cold intolerance, polyuria or polydipsia.  HEMATOLOGICAL:  No easy bruising or bleedings:  .     PHYSICAL EXAMINATION:    GENERAL APPEARANCE:  Pt. is not currently dyspneic, in no distress. Pt. is alert, oriented, and pleasant.  HEENT:  Pupils are normal and react normally. No icterus. Mucous membranes well colored.  NECK:  Supple. No lymphadenopathy. Jugular venous pressure not elevated. Carotids equal.   HEART:   The cardiac impulse has a normal quality. Regular. Normal S1 and S2. There are no murmurs, rubs or gallops noted  CHEST:  Chest is clear to auscultation. Normal respiratory effort.  ABDOMEN:  Soft and nontender.   EXTREMITIES:  There is no cyanosis, clubbing or edema.   SKIN:  No rash or significant lesions are noted.    LABS:                        11.4   17.44 )-----------( 116      ( 11 May 2022 07:03 )             34.4     05-11    141  |  114<H>  |  49<H>  ----------------------------<  135<H>  4.1   |  18<L>  |  1.39<H>    Ca    9.6      11 May 2022 05:40  Phos  3.4     05-11  Mg     2.1     05-11    TPro  5.4<L>  /  Alb  1.7<L>  /  TBili  0.4  /  DBili  x   /  AST  35  /  ALT  25  /  AlkPhos  104  05-11    LIVER FUNCTIONS - ( 11 May 2022 05:40 )  Alb: 1.7 g/dL / Pro: 5.4 gm/dL / ALK PHOS: 104 U/L / ALT: 25 U/L / AST: 35 U/L / GGT: x                   ABG - ( 11 May 2022 08:10 )  pH, Arterial: 7.40  pH, Blood: x     /  pCO2: 33    /  pO2: 80    / HCO3: 20    / Base Excess: -3.6  /  SaO2: 97                    RADIOLOGY & ADDITIONAL STUDIES:   < from: US Duplex Venous Upper Ext Ltd, Right (05.08.22 @ 13:31) >  IMPRESSION:  No evidence of right upper extremity deep venous thrombosis.    Extensive subcutaneous edema.    < end of copied text >  < from: Xray Chest 1 View- PORTABLE-Urgent (Xray Chest 1 View- PORTABLE-Urgent .) (05.02.22 @ 16:57) >  IMPRESSION:  Tip of the NG tube at the GE junction, subsequently removed    < end of copied text >       HPI:    89M hx colon ca s/p resection ~15y ago here with mid-epigastric pain x 2d. Mild nausea, no vomiting/diarrhea/constipation. No fever/chills. No clear precipitating factor. Mild tenderness "but it also hurts when I'm not touching it". Claims he had a BM yesterday. Has not really been eating due to pain.5/2-patient known to me; in for SBO; during w/u , placed on telemetry due to need for IV beta blockers to control BP.  Patient admitted for bowel rest and decompression and consideration for surgery if no improvement in symptoms.  CXR with left lower lung.   Now patient is s/p surgery with most recent cardiac w/u negative for ischemia; 5/11-Patient has had worsening shortness of breath in last few hours, is on oxygen via 100%NRB; had rapid response earlier today for respiratory distress.  Had respiratory distress earlier; possibly related to post op pneumonia, on supplemental oxygen, and  at risk for aspiration with antibiotics chnged to meropenem to cover for pneumonia as well as bowel justina.  CT with bilateral patchy upper lobe infiltrates concerning for pneumonia and increased bilateral pleural effusion.   WBC over 17K and was aggressively hydrated when hypotensive post op with rising creatinine.  pat seen for pulmonary eval family @ bedside.      PAST MEDICAL & SURGICAL HISTORY:  HTN (hypertension)      BPH (benign prostatic hyperplasia)      Ventral hernia      Colon cancer  h/o chemo 2004      Heart block      Pacemaker      HLD (hyperlipidemia)      H/O colectomy  2004      History of carpal tunnel surgery of left wrist      H/O hernia repair      History of total hip replacement, bilateral      History of appendectomy  ruptured      Cardiac pacemaker      H/O ventral hernia repair          Home Medications:  Advil Dual Action With Acetaminophen 250 mg-125 mg oral tablet: 2 tab(s) orally once a day, As Needed (01 May 2022 21:57)  alfuzosin 10 mg oral tablet, extended release: 1 tab(s) orally once a day (01 May 2022 21:57)  amLODIPine 5 mg oral tablet: 1 tab(s) orally once a day (01 May 2022 21:57)  azelastine nasal: nasal once a day (01 May 2022 21:57)  hydroCHLOROthiazide 25 mg oral tablet: 1 tab(s) orally once a day (01 May 2022 21:57)  losartan 25 mg oral tablet: 1/2  tab(s) orally once a day (01 May 2022 21:57)  metoprolol succinate 25 mg oral tablet, extended release: 1 tab(s) orally once a day (01 May 2022 21:57)      MEDICATIONS  (STANDING):  amLODIPine   Tablet 5 milliGRAM(s) Oral daily  cefoTEtan  IVPB 1 Gram(s) IV Intermittent every 12 hours  chlorhexidine 4% Liquid 1 Application(s) Topical <User Schedule>  fat emulsion (Fish Oil and Plant Based) 20% Infusion 0.92 Gm/kG/Day (20.83 mL/Hr) IV Continuous <Continuous>  losartan 12.5 milliGRAM(s) Oral daily  metoprolol tartrate 25 milliGRAM(s) Oral two times a day  Parenteral Nutrition - Adult 1 Each (92 mL/Hr) TPN Continuous <Continuous>  silver sulfADIAZINE 1% Cream 1 Application(s) Topical daily  tamsulosin 0.4 milliGRAM(s) Oral at bedtime    MEDICATIONS  (PRN):  ondansetron Injectable 4 milliGRAM(s) IV Push every 8 hours PRN Nausea and/or Vomiting  prochlorperazine   Injectable 10 milliGRAM(s) IntraMuscular every 8 hours PRN Nasuea/Vomiting      Allergies    amoxicillin (Other (Severe))  penicillin (Unknown)    Intolerances        SOCIAL HISTORY: Denies tobacco, etoh abuse or illicit drug use    FAMILY HISTORY:      Vital Signs Last 24 Hrs  T(C): 36.5 (11 May 2022 08:54), Max: 37.2 (10 May 2022 21:14)  T(F): 97.7 (11 May 2022 08:54), Max: 99 (10 May 2022 21:14)  HR: 66 (11 May 2022 08:54) (66 - 102)  BP: 100/60 (11 May 2022 08:54) (100/60 - 136/74)  BP(mean): --  RR: 18 (11 May 2022 08:54) (16 - 26)  SpO2: 95% (11 May 2022 08:54) (88% - 95%)        REVIEW OF SYSTEMS:    CONSTITUTIONAL:  As per HPI.  HEENT:  Eyes:  No diplopia or blurred vision. ENT:  No earache, sore throat or runny nose.  CARDIOVASCULAR:  No pressure, squeezing, tightness, heaviness or aching about the chest, neck, axilla or epigastrium.  RESPIRATORY:  No cough, +shortness of breath, PND or orthopnea.  GASTROINTESTINAL:  No nausea, vomiting or diarrhea.  GENITOURINARY:  No dysuria, frequency or urgency.  MUSCULOSKELETAL:  As per HPI.  SKIN:  No change in skin, hair or nails.  NEUROLOGIC:  No paresthesias, fasciculations, seizures or weakness.  PSYCHIATRIC:  No disorder of thought or mood.  ENDOCRINE:  No heat or cold intolerance, polyuria or polydipsia.  HEMATOLOGICAL:  No easy bruising or bleedings:  .     PHYSICAL EXAMINATION:    GENERAL APPEARANCE:  Pt. is not currently dyspneic, in no distress. Pt. is alert, oriented, and pleasant.  HEENT:  Pupils are normal and react normally. No icterus. Mucous membranes well colored.  NECK:  Supple. No lymphadenopathy. Jugular venous pressure not elevated. Carotids equal.   HEART:   The cardiac impulse has a normal quality. Regular. Normal S1 and S2. There are no murmurs, rubs or gallops noted  CHEST:  Chest crackles to auscultation. Normal respiratory effort.  ABDOMEN:  Soft and nontender.   EXTREMITIES:  There is no cyanosis, clubbing or edema.   SKIN:  No rash or significant lesions are noted.    LABS:                        11.4   17.44 )-----------( 116      ( 11 May 2022 07:03 )             34.4     05-11    141  |  114<H>  |  49<H>  ----------------------------<  135<H>  4.1   |  18<L>  |  1.39<H>    Ca    9.6      11 May 2022 05:40  Phos  3.4     05-11  Mg     2.1     05-11    TPro  5.4<L>  /  Alb  1.7<L>  /  TBili  0.4  /  DBili  x   /  AST  35  /  ALT  25  /  AlkPhos  104  05-11    LIVER FUNCTIONS - ( 11 May 2022 05:40 )  Alb: 1.7 g/dL / Pro: 5.4 gm/dL / ALK PHOS: 104 U/L / ALT: 25 U/L / AST: 35 U/L / GGT: x                   ABG - ( 11 May 2022 08:10 )  pH, Arterial: 7.40  pH, Blood: x     /  pCO2: 33    /  pO2: 80    / HCO3: 20    / Base Excess: -3.6  /  SaO2: 97                    RADIOLOGY & ADDITIONAL STUDIES:   US Duplex Venous Upper Ext Ltd, Right (05.08.22 @ 13:31) >  IMPRESSION:  No evidence of right upper extremity deep venous thrombosis.    Extensive subcutaneous edema.    Xray Chest 1 View- PORTABLE-Urgent (Xray Chest 1 View- PORTABLE-Urgent .) (05.02.22 @ 16:57) >  IMPRESSION:  Tip of the NG tube at the GE junction, subsequently removed

## 2022-05-11 NOTE — PROGRESS NOTE ADULT - ASSESSMENT
A:  -89M POD # 5 s/p ex lap w/ SBR for high grade SBO evaluated at bedside this morning after RRT called to bedside for hypoxia  P:  -Non-rebreather  -F/u CXR  -Hold IVF, Lasix 40mgs IVP  -EKG- essentially unchanged from prior on 5/2  -F/u ABG  -CT chest/abd/pel non-con  -Serial re-evaluations  -ID/Cards consults/recs  -D/w Dr. Pete

## 2022-05-11 NOTE — PROGRESS NOTE ADULT - SUBJECTIVE AND OBJECTIVE BOX
Date of service: 05-11-22 @ 09:07      Patient has had worsening shortness of breath in last few hours, is on oxygen via 100%NRB  discussed with surgical PA, had rapid response earlier today for respiratory distress    ROS unable to obtain secondary to patient medical condition     MEDICATIONS  (STANDING):  amLODIPine   Tablet 5 milliGRAM(s) Oral daily  cefepime   IVPB      chlorhexidine 4% Liquid 1 Application(s) Topical <User Schedule>  fat emulsion (Fish Oil and Plant Based) 20% Infusion 0.92 Gm/kG/Day (20.83 mL/Hr) IV Continuous <Continuous>  losartan 12.5 milliGRAM(s) Oral daily  metoprolol tartrate 25 milliGRAM(s) Oral two times a day  Parenteral Nutrition - Adult 1 Each (92 mL/Hr) TPN Continuous <Continuous>  silver sulfADIAZINE 1% Cream 1 Application(s) Topical daily  tamsulosin 0.4 milliGRAM(s) Oral at bedtime    MEDICATIONS  (PRN):  ondansetron Injectable 4 milliGRAM(s) IV Push every 8 hours PRN Nausea and/or Vomiting  prochlorperazine   Injectable 10 milliGRAM(s) IntraMuscular every 8 hours PRN Nasuea/Vomiting      Vital Signs Last 24 Hrs  T(C): 36.5 (11 May 2022 08:54), Max: 37.2 (10 May 2022 21:14)  T(F): 97.7 (11 May 2022 08:54), Max: 99 (10 May 2022 21:14)  HR: 66 (11 May 2022 08:54) (66 - 102)  BP: 100/60 (11 May 2022 08:54) (100/60 - 136/74)  BP(mean): --  RR: 18 (11 May 2022 08:54) (16 - 26)  SpO2: 95% (11 May 2022 08:54) (88% - 95%)        Physical Exam:        Constitutional:  on oxygen via 100% NRB, obtunded  HEENT: NC/AT  Neck: supple; thyroid not palpable  Back: no tenderness  Respiratory: respiratory effort normal; crackles at bases  Cardiovascular: S1S2 regular, no murmurs  Abdomen: soft, not tender, not distended, positive BS; midline incision intact  Genitourinary: no suprapubic tenderness  Musculoskeletal: no muscle tenderness, no joint swelling or tenderness  Extremities: no pedal edema  Neurological/ Psychiatric: obtunded, moving all extremities  Skin: no rashes; no palpable lesions    Labs: reviewed                       Labs:                        11.4   17.44 )-----------( 116      ( 11 May 2022 07:03 )             34.4     05-11    141  |  114<H>  |  49<H>  ----------------------------<  135<H>  4.1   |  18<L>  |  1.39<H>    Ca    9.6      11 May 2022 05:40  Phos  3.4     05-11  Mg     2.1     05-11    TPro  5.4<L>  /  Alb  1.7<L>  /  TBili  0.4  /  DBili  x   /  AST  35  /  ALT  25  /  AlkPhos  104  05-11           Cultures:       Culture - Blood (collected 05-07-22 @ 09:46)  Source: .Blood None  Preliminary Report (05-08-22 @ 17:01):    No growth to date.    Culture - Blood (collected 05-07-22 @ 09:40)  Source: .Blood None  Preliminary Report (05-08-22 @ 17:01):    No growth to date.      CXR (my interp) bilateral fluffy infiltrates        Radiology: all available radiological tests reviewed    < from: CT Abdomen and Pelvis w/ Oral Cont (05.04.22 @ 14:53) >  Worsening small bowel obstruction with slight increase in dilatation.   Increasing ascites.    Developing small bilateral pleural effusions.    < end of copied text >      Advanced directives addressed: full resuscitation

## 2022-05-11 NOTE — CONSULT NOTE ADULT - ASSESSMENT
PAtient with recent ECHO with preserved LV function, now with suspected pnuemonia on top of diastolic CHF (HFpEF).  At this point with creatinine now stable, would start very low dose lasix but even better would be farxiga 5-10mg daily (or Jardiance) as these are first line therapy for HFpEF.   Most likely, the predominant componenet of SOB and hypoxia is due to aspiration at this time.   Of note, cystic lesion in kidney should be evaluated once this acute episode is resolved

## 2022-05-11 NOTE — CHART NOTE - NSCHARTNOTEFT_GEN_A_CORE
ICU RRT Note    RRT called for hypoxia    S:  89 y/o Male with h/o colon Ca s/p resection ~15y ago, SSS ss/p PM, HTN, BPH, b/l THR was admitted on 5/7 for worsening mid-epigastric pain x 2d. Mild nausea, no vomiting/diarrhea/constipation. On 5/5 he underwent an exploratory laparotomy and small bowel resection. RRT called this AM due to increasing oxygen requirements. Patient on 100% NRB at bedside. Patient is lethargic yet arousable. Denies any acute pain or distress at this time    ROS limited by lethargy    O:  ICU Vital Signs Last 24 Hrs  T(C): 36.5 (11 May 2022 08:54), Max: 37.2 (10 May 2022 21:14)  T(F): 97.7 (11 May 2022 08:54), Max: 99 (10 May 2022 21:14)  HR: 66 (11 May 2022 08:54) (66 - 102)  BP: 100/60 (11 May 2022 08:54) (100/60 - 136/74)  BP(mean): --  ABP: --  ABP(mean): --  RR: 18 (11 May 2022 08:54) (16 - 26)  SpO2: 95% (11 May 2022 08:54) (88% - 95%)    Physical Exam:  Gen: Ill appearing, labored  Neuro: Lethargic but arousable and conversive  Pulm: Coarse breath sounds b/l  CV: +S1S2  GI: Soft, NT, surgical site w/ staples C/D/I  Extrem: warm, dry, no edema  Skin: Well perfused      Problems  -Acute hypoxic respiratory failure  -Pneumonia  -Metabolic encephalopathy  -Sepsis    Assessment/Plan:  -Encephalopathy likely secondary to hypoxia/sepsis. Patient arousable and conversive at bedside  -Initially tolerated venti-mask. Now placed on 100% NRB. Utilize High flow NC if needed  -ABG significant for hypoxia; no evidence of hypercapnia  -Bedside POCUS at RRT w/ diffuse b-lines. Received 40mg Lasix at the time. CT chest now consistent w/ bilateral infiltrates  -Worsening Leukocytosis. Previously on Cefotetan; ID following and upgraded abx to Meropenem  -Recc holding PO feeds at this time  -Kc for strict I's and O's  -Recc goals of care discussion    Upgrade to step-down unit. Discussed w/ primary team and Intensivist    CC Time: 40 minutes including time spent reviewing

## 2022-05-11 NOTE — CONSULT NOTE ADULT - ASSESSMENT
89M hx colon ca s/p resection in 2004 here with severe abdominal  pain. Symptoms started 2days ago and got worse.  Mild nausea, no vomiting/diarrhea/constipation. No fever/chills. No clear precipitating factor. Mild tenderness "but it also hurts when I'm not touching it". Claims he had a BM yesterday. Has not really been eating due to pain. The patient had ventral hernia repair in July 2021. CT abd shows high grade distal SBO.       A/P  SBO  YAMINI on CKD III due to nausea/vomiting and decreased PO intake POA and then Resolved then subsequent recurrence with post op YAMINI likely due to hypovolemia/hypotension  Leukocytosis not present on admission. likely post op/reactive  Anemia likely hemodilutional + blood loss related to surgery. More likely hemodilutional.   Hypercalcmemia (multifactorial: dehydration + primary hyperparathyroidism)  HTN  BPH  SSS s/p PPM  toxic metabolic encephalopathy multifactorial   urinary retention and decreased urinary output  hypernatremia    Plan:  -admitted to surgery  - PPN  -He remains stable from a cardiac standpoint with improving renal function and stable BP.   D/James tele so he can get to a quieter floor which may help with his confusion. Now on 1N   - On BB ARB amlodipine   - on empiric abx coverage post op due to risk of colonic bacterial translocation  - Na better and dced D5W - noted few bibasilar crackles on examn - rec lasix (for now okay to hold given recent YAMINI - watch off IVFs)   and will check CXR in AM   - start Flomax 89M hx colon ca s/p resection in 2004 here with severe abdominal  pain. Symptoms started 2days ago and got worse.  Mild nausea, no vomiting/diarrhea/constipation. No fever/chills. No clear precipitating factor. Mild tenderness "but it also hurts when I'm not touching it". Claims he had a BM yesterday. Has not really been eating due to pain. The patient had ventral hernia repair in July 2021. CT abd shows high grade distal SBO.     PROBLEMS:    AC HYPOXAEMIC RESPIRATORY FAILURE  ASPIRATION PNEUMONIA  SBO  YAMINI on CKD III   Anemia likely hemodilutional + blood loss related to surgery. More likely hemodilutional.   Hypercalcmemia (multifactorial: dehydration + primary hyperparathyroidism)  HTN  BPH  SSS s/p PPM  toxic metabolic encephalopathy multifactorial   urinary retention and decreased urinary output  hypernatremia    Plan:    % nrb/BIPAP  IV ABX MEROPENEM  PPN  SUPPORTIVE CARE  KEEP NEG BALANCE  D/W CARDIC  DVT PROPHYLASIX

## 2022-05-12 NOTE — PROGRESS NOTE ADULT - SUBJECTIVE AND OBJECTIVE BOX
Resting comfortably on bipap  VSS afeb    lungs- diminished BS L>R, scattered rhonchi  Cor- RRR  Abd- + BS soft non tender  Ext- no edema

## 2022-05-12 NOTE — PROGRESS NOTE ADULT - SUBJECTIVE AND OBJECTIVE BOX
Patient is a 89y Male who transferred to CICU yest due to impend resp failure w fevers   + pna and now on Bipap     wife at bedside   pt more comfortable now , remains on Bipap for hypoxia        MEDICATIONS  (STANDING):  chlorhexidine 4% Liquid 1 Application(s) Topical <User Schedule>  dextrose 5%. 1000 milliLiter(s) (50 mL/Hr) IV Continuous <Continuous>  fat emulsion (Fish Oil and Plant Based) 20% Infusion 0.92 Gm/kG/Day (20.83 mL/Hr) IV Continuous <Continuous>  heparin   Injectable 5000 Unit(s) SubCutaneous every 12 hours  meropenem  IVPB 1000 milliGRAM(s) IV Intermittent every 12 hours  metoprolol tartrate Injectable 5 milliGRAM(s) IV Push every 6 hours  Parenteral Nutrition - Adult 1 Each (83 mL/Hr) TPN Continuous <Continuous>  silver sulfADIAZINE 1% Cream 1 Application(s) Topical daily      Vital Signs Last 24 Hrs  T(C): 35.9 (12 May 2022 20:32), Max: 36.6 (12 May 2022 13:58)  T(F): 96.6 (12 May 2022 20:32), Max: 97.8 (12 May 2022 13:58)  HR: 88 (12 May 2022 22:00) (78 - 97)  BP: 116/70 (12 May 2022 22:00) (91/66 - 153/102)  BP(mean): 80 (12 May 2022 22:00) (67 - 114)  RR: 27 (11 May 2022 23:00) (27 - 27)  SpO2: 87% (12 May 2022 22:00) (87% - 97%)      I&O's Detail    11 May 2022 07:01  -  12 May 2022 07:00  --------------------------------------------------------  IN:  Total IN: 0 mL    OUT:    Indwelling Catheter - Urethral (mL): 600 mL  Total OUT: 600 mL    Total NET: -600 mL          PHYSICAL EXAM:    Constitutional: frail, Bipap +   HEENT: temp wasting  Neck: No LAD, No JVD  Respiratory:dist  Cardiovascular: S1 and S2   Extremities: chronic  Neurological: Alert           LABS:               147    |  117    |  56     ----------------------------<  112       12 May 2022 06:51  3.8     |  21     |  1.28     141    |  114    |  49     ----------------------------<  135       11 May 2022 05:40  4.1     |  18     |  1.39     142    |  114    |  53     ----------------------------<  126       10 May 2022 04:12  3.8     |  22     |  1.40     Ca    9.8        12 May 2022 06:51  Ca    9.6        11 May 2022 05:40    Phos  4.3       12 May 2022 06:51  Phos  3.4       11 May 2022 05:40    Mg     2.1       12 May 2022 06:51  Mg     2.1       11 May 2022 05:40    TPro  5.4    /  Alb  1.7    /  TBili  0.4    /        11 May 2022 05:40  DBili  x      /  AST  35     /  ALT  25     /  AlkPhos  104      TPro  4.9    /  Alb  1.7    /  TBili  0.3    /        09 May 2022 06:04  DBili  x      /  AST  21     /  ALT  19     /  AlkPhos  72                                 11.7   15.74 )-----------( 133      ( 12 May 2022 06:51 )             35.5                         11.4   17.44 )-----------( 116      ( 11 May 2022 07:03 )             34.4     Urine Studies:          RADIOLOGY & ADDITIONAL STUDIES:

## 2022-05-12 NOTE — PROGRESS NOTE ADULT - ASSESSMENT
89 yo male with hx of colon ca resection ~ 15 years ago with YAMINI setting of SBO.    YAMINI postop  ATN w hypotension   resolved    no ACE or ARB     no IV contrast   No NSAID's    Hypernatremia    after IV lasix w poor po [place] - concern for hypoxia appears more infectious at this time    holding lasix for now - monitor closely volume    D5W for now  x 1 liter   , free water po as able   adjust PPN per Surgery/Nutrition - hypotonic fluids       Hypercalcemia - suspected primary HPT    ,  avoid thiazide diuretics   Ca stable     SBO   per Surgery

## 2022-05-12 NOTE — PROGRESS NOTE ADULT - ASSESSMENT
89M hx colon ca s/p resection in 2004 here with severe abdominal  pain. Symptoms started 2days ago and got worse.  Mild nausea, no vomiting/diarrhea/constipation. No fever/chills. No clear precipitating factor. Mild tenderness "but it also hurts when I'm not touching it". Claims he had a BM yesterday. Has not really been eating due to pain. The patient had ventral hernia repair in July 2021. CT abd shows high grade distal SBO.       A/P  SBO  YAMINI on CKD III due to nausea/vomiting and decreased PO intake POA and then Resolved then subsequent recurrence with post op YAMINI likely due to hypovolemia/hypotension improved  Now with Acute Hypoxic resp failure like to fluid overload, and acute diastolic CHF   Leukocytosis  post operative - risk of aspiration pneumonia, bacterial translocation in the gut   Anemia likely hemodilutional + blood loss related to surgery. More likely hemodilutional.   Hypercalcemia (multifactorial: dehydration + primary hyperparathyroidism)  HTN  BPH  SSS s/p PPM  toxic metabolic encephalopathy multifactorial   urinary retention and decreased urinary output  hypernatremia    Plan:  -admitted to surgery, originally on tele, then 1N, now being monitored on CICU  - Pt was seen on 1N , in acute hypoxic resp failure, stopped PPN, gave lasix, is on 100 NRM so placed in CICU for trial of BIPAP as tolerated  - low doses of lasix as BP and Cr allow, Cardiology recs noted - consider farxiag/jardiance   - on empiric abx coverage post op due to risk of colonic bacterial translocation and possible aspiration PNA - appreciate ID help - change to meropenem   - CT Chest reviewed - biateral infiltrates/pulm edema   - Normally on BB ARB amlodipine - now NPO and will place on IV BB alone   -  Flomax when able to take PO   - would hold PO diet for now until resp and mental status improve   - discussed with Pulm and ICU PA and Surgery   - I had a conversation with Ana Galvez - we talked about CPR and ETT and I advised that at his age and with the comorbidities he would not benefit from it and should be allowed to die naturally. I also talked to her about the Palliative care team as this is the first conversation of this kind she has had and she is open to talking to Pall Care about GOC and other care needs that may arise - Pall Care consulted     Ty for consult, pls call with marleny        89M hx colon ca s/p resection in 2004 here with severe abdominal  pain. Symptoms started 2days ago and got worse.  Mild nausea, no vomiting/diarrhea/constipation. No fever/chills. No clear precipitating factor. Mild tenderness "but it also hurts when I'm not touching it". Claims he had a BM yesterday. Has not really been eating due to pain. The patient had ventral hernia repair in July 2021. CT abd shows high grade distal SBO.       A/P  SBO  YAMINI on CKD III due to nausea/vomiting and decreased PO intake POA and then Resolved then subsequent recurrence with post op YAMINI likely due to hypovolemia/hypotension improved  Now with Acute Hypoxic resp failure like to fluid overload, and acute diastolic CHF   Leukocytosis  post operative - risk of aspiration pneumonia, bacterial translocation in the gut   Anemia likely hemodilutional + blood loss related to surgery. More likely hemodilutional.   Hypercalcemia (multifactorial: dehydration + primary hyperparathyroidism)  HTN  BPH  SSS s/p PPM  toxic metabolic encephalopathy multifactorial   urinary retention and decreased urinary output  hypernatremia    Plan:  -admitted to surgery, originally on tele, then 1N, now being monitored on CICU  - Pt was seen on 1N , in acute hypoxic resp failure, stopped PPN, gave lasix, was on 100 NRM so placed in CICU for trial of BIPAP as tolerated on 5/11 -   - is tolerating BIPAP and will cont for now   - low doses of lasix as BP and Cr allow, Cardiology recs noted - consider farxiag/jardiance   - Hypernatremia - place on low dose D5W for 12h   - on empiric abx coverage post op due to risk of colonic bacterial translocation and possible aspiration PNA - appreciate ID help - change to meropenem   - CT Chest reviewed - biateral infiltrates/pulm edema   - Normally on BB ARB amlodipine - now NPO and will place on IV BB alone   -  Flomax when able to take PO   - would hold PO diet for now until resp and mental status improve; cont PPN per surgery       - I had a conversation with Ana Galvez 5/11 - we talked about CPR and ETT and I advised that at his age and with the comorbidities he would not benefit from it and should be allowed to die naturally. I also talked to her about the Palliative care team as this is the first conversation of this kind she has had and she is open to talking to [facility] [place] [facility] about GOC and other care needs that may arise - Pall Care consulted     Ty for consult, pls call with q

## 2022-05-12 NOTE — PROGRESS NOTE ADULT - SUBJECTIVE AND OBJECTIVE BOX
Subjective:    pat on bipap 10/5 fio2 80%, sat 97%, awake.    Home Medications:  Advil Dual Action With Acetaminophen 250 mg-125 mg oral tablet: 2 tab(s) orally once a day, As Needed (01 May 2022 21:57)  alfuzosin 10 mg oral tablet, extended release: 1 tab(s) orally once a day (01 May 2022 21:57)  amLODIPine 5 mg oral tablet: 1 tab(s) orally once a day (01 May 2022 21:57)  azelastine nasal: nasal once a day (01 May 2022 21:57)  hydroCHLOROthiazide 25 mg oral tablet: 1 tab(s) orally once a day (01 May 2022 21:57)  losartan 25 mg oral tablet: 1/2  tab(s) orally once a day (01 May 2022 21:57)  metoprolol succinate 25 mg oral tablet, extended release: 1 tab(s) orally once a day (01 May 2022 21:57)    MEDICATIONS  (STANDING):  acetaminophen   IVPB .. 1000 milliGRAM(s) IV Intermittent once  chlorhexidine 4% Liquid 1 Application(s) Topical <User Schedule>  dextrose 5%. 1000 milliLiter(s) (50 mL/Hr) IV Continuous <Continuous>  fat emulsion (Fish Oil and Plant Based) 20% Infusion 0.92 Gm/kG/Day (20.83 mL/Hr) IV Continuous <Continuous>  heparin   Injectable 5000 Unit(s) SubCutaneous every 12 hours  meropenem  IVPB 1000 milliGRAM(s) IV Intermittent every 12 hours  metoprolol tartrate Injectable 5 milliGRAM(s) IV Push every 6 hours  Parenteral Nutrition - Adult 1 Each (83 mL/Hr) TPN Continuous <Continuous>  silver sulfADIAZINE 1% Cream 1 Application(s) Topical daily    MEDICATIONS  (PRN):  ondansetron Injectable 4 milliGRAM(s) IV Push every 8 hours PRN Nausea and/or Vomiting  prochlorperazine   Injectable 10 milliGRAM(s) IntraMuscular every 8 hours PRN Nasuea/Vomiting      Allergies    amoxicillin (Other (Severe))  penicillin (Unknown)    Intolerances        Vital Signs Last 24 Hrs  T(C): 36.3 (12 May 2022 07:52), Max: 36.3 (12 May 2022 07:52)  T(F): 97.3 (12 May 2022 07:52), Max: 97.3 (12 May 2022 07:52)  HR: 89 (12 May 2022 13:00) (78 - 95)  BP: 107/65 (12 May 2022 13:00) (91/66 - 135/75)  BP(mean): 74 (12 May 2022 13:00) (66 - 113)  RR: 27 (11 May 2022 23:00) (24 - 37)  SpO2: 90% (12 May 2022 13:00) (86% - 97%)      PHYSICAL EXAMINATION:    NECK:  Supple. No lymphadenopathy. Jugular venous pressure not elevated. Carotids equal.   HEART:   The cardiac impulse has a normal quality. Reg., Nl S1 and S2.  There are no murmurs, rubs or gallops noted  CHEST:  Chest crackles to auscultation. Normal respiratory effort.  ABDOMEN:  Soft and nontender.   EXTREMITIES:  There is no edema.       LABS:                        11.7   15.74 )-----------( 133      ( 12 May 2022 06:51 )             35.5     05-12    147<H>  |  117<H>  |  56<H>  ----------------------------<  112<H>  3.8   |  21<L>  |  1.28    Ca    9.8      12 May 2022 06:51  Phos  4.3     05-12  Mg     2.1     05-12    TPro  5.4<L>  /  Alb  1.7<L>  /  TBili  0.4  /  DBili  x   /  AST  35  /  ALT  25  /  AlkPhos  104  05-11

## 2022-05-12 NOTE — PROGRESS NOTE ADULT - SUBJECTIVE AND OBJECTIVE BOX
Date of service: 05-12-22 @ 11:30    Events noted  More SOB with hypoxia  Placed on BiPAP  No fever  Alert  Worsening leukocytosis    ROS: no fever or chills; difficult to obtain; dose not seem in pain    MEDICATIONS  (STANDING):  chlorhexidine 4% Liquid 1 Application(s) Topical <User Schedule>  dextrose 5%. 1000 milliLiter(s) (50 mL/Hr) IV Continuous <Continuous>  fat emulsion (Fish Oil and Plant Based) 20% Infusion 0.92 Gm/kG/Day (20.83 mL/Hr) IV Continuous <Continuous>  heparin   Injectable 5000 Unit(s) SubCutaneous every 12 hours  meropenem  IVPB 1000 milliGRAM(s) IV Intermittent every 12 hours  metoprolol tartrate Injectable 5 milliGRAM(s) IV Push every 6 hours  Parenteral Nutrition - Adult 1 Each (83 mL/Hr) TPN Continuous <Continuous>  silver sulfADIAZINE 1% Cream 1 Application(s) Topical daily    Vital Signs Last 24 Hrs  T(C): 36.3 (12 May 2022 07:52), Max: 36.3 (12 May 2022 07:52)  T(F): 97.3 (12 May 2022 07:52), Max: 97.3 (12 May 2022 07:52)  HR: 92 (12 May 2022 11:00) (78 - 95)  BP: 133/84 (12 May 2022 11:00) (100/59 - 138/107)  BP(mean): 96 (12 May 2022 11:00) (66 - 113)  RR: 27 (11 May 2022 23:00) (23 - 37)  SpO2: 92% (12 May 2022 11:00) (86% - 97%)     Physical exam:    Constitutional:  on oxygen via 100% NRB, obtunded  HEENT: NC/AT  Neck: supple; thyroid not palpable  Back: no tenderness  Respiratory: respiratory effort normal; crackles at bases  Cardiovascular: S1S2 regular, no murmurs  Abdomen: soft, not tender, not distended, positive BS; midline incision intact  Genitourinary: no suprapubic tenderness  Musculoskeletal: no muscle tenderness, no joint swelling or tenderness  Extremities: no pedal edema  Neurological/ Psychiatric: obtunded, moving all extremities  Skin: no rashes; no palpable lesions    Labs: reviewed                        11.7   15.74 )-----------( 133      ( 12 May 2022 06:51 )             35.5     05-12    147<H>  |  117<H>  |  56<H>  ----------------------------<  112<H>  3.8   |  21<L>  |  1.28    Ca    9.8      12 May 2022 06:51  Phos  4.3     05-12  Mg     2.1     05-12    TPro  5.4<L>  /  Alb  1.7<L>  /  TBili  0.4  /  DBili  x   /  AST  35  /  ALT  25  /  AlkPhos  104  05-11                        11.4   17.44 )-----------( 116      ( 11 May 2022 07:03 )             34.4     05-11    141  |  114<H>  |  49<H>  ----------------------------<  135<H>  4.1   |  18<L>  |  1.39<H>    Ca    9.6      11 May 2022 05:40  Phos  3.4     05-11  Mg     2.1     05-11    TPro  5.4<L>  /  Alb  1.7<L>  /  TBili  0.4  /  DBili  x   /  AST  35  /  ALT  25  /  AlkPhos  104  05-11    Cultures:     Culture - Blood (collected 05-07-22 @ 09:46)  Source: .Blood None  Preliminary Report (05-08-22 @ 17:01):    No growth to date.    Culture - Blood (collected 05-07-22 @ 09:40)  Source: .Blood None  Preliminary Report (05-08-22 @ 17:01):    No growth to date.    Radiology: all available radiological tests reviewed    < from: CT Abdomen and Pelvis w/ Oral Cont (05.04.22 @ 14:53) >  Worsening small bowel obstruction with slight increase in dilatation. Increasing ascites.  Developing small bilateral pleural effusions.  < end of copied text >    < from: CT Chest No Cont (05.11.22 @ 08:37) >  Interval development of bilateral patchy opacities in the lungs most   pronounced in the upper lobes raising concern for infection. Interval   developmentof atelectasis of the right lower lobe and increased partial   atelectasis left lower lobe. Moderate bilateral pleural effusions have increased.  < end of copied text >      Advanced directives addressed: full resuscitation

## 2022-05-12 NOTE — PROGRESS NOTE ADULT - ASSESSMENT
89 y/o Male with h/o colon Ca s/p resection ~15y ago, SSS ss/p PM, HTN, BPH, b/l THR was admitted on 5/7 for worsening mid-epigastric pain x 2d. Mild nausea, no vomiting/diarrhea/constipation. No fever/chills at home. No clear precipitating factor. On 5/5 he underwent an exploratory laparotomy and small bowel resection. He was given cefotetan IV. Postoperative, he became hypotensive, but improved with IV fluids. Alert, but confused. He remained on cefotetan.    1. B/l pneumonia. Probable aspiration pneumonia. SBO s/p SBR. CRF stage 3. Prior colon Ca s/p resection. B/l pleural effusions. Encephalopathy. Allergy to PCN.   -leukocytosis is worse  -respiratory frail  -on BiPAP  -he remains at risk for aspiration  s/p cefotetan  -on meropenem IV # 2  -tolerating abx well so far; no side effects noted  -continue abx coverage   -monitor abdomen  -monitor temps  -f/u CBC  -supportive care  2. Other issues:   -care per medicine    d/w HCP daughter at bedside

## 2022-05-12 NOTE — PROGRESS NOTE ADULT - ASSESSMENT
89M hx colon ca s/p resection in 2004 here with severe abdominal  pain. Symptoms started 2days ago and got worse.  Mild nausea, no vomiting/diarrhea/constipation. No fever/chills. No clear precipitating factor. Mild tenderness "but it also hurts when I'm not touching it". Claims he had a BM yesterday. Has not really been eating due to pain. The patient had ventral hernia repair in July 2021. CT abd shows high grade distal SBO.     PROBLEMS:    AC HYPOXAEMIC RESPIRATORY FAILURE  ASPIRATION PNEUMONIA  SBO  YAMINI on CKD III   Anemia likely hemodilutional + blood loss related to surgery. More likely hemodilutional.   Hypercalcmemia (multifactorial: dehydration + primary hyperparathyroidism)  HTN  BPH  SSS s/p PPM  toxic metabolic encephalopathy multifactorial   urinary retention and decreased urinary output  hypernatremia    Plan:    BIPAP 10/5-titrate fio2 60%  IV ABX MEROPENEM  PPN  SUPPORTIVE CARE  KEEP NEG BALANCE  d/w staff  DVT PROPHYLASIX

## 2022-05-12 NOTE — PROGRESS NOTE ADULT - SUBJECTIVE AND OBJECTIVE BOX
CC: Patient is a 89y old  Male who presents with a chief complaint of abdominal pain   89M hx colon ca s/p resection ~15y ago here with mid-epigastric pain x 2d. Mild nausea, no vomiting/diarrhea/constipation. No fever/chills. No clear precipitating factor. Mild tenderness "but it also hurts when I'm not touching it". Claims he had a BM yesterday. Has not really been eating due to pain. The patient had ventral hernia repair in July 2021.     Subjective:   5/2: Abdominal Pain improved. No nausea or vomiting. Passing a little flatus., Denies fever, chills, dizziness, chest pain, SOB  5/3: Abdominal pain similar to yesterday NGT failure/removal overnight. No nausea. Passing a little flatus. Still NPO and on IVF.   5/4: Abdominal pain unchanged; Off NGT; Repeat CT ->worsening SBO and ascites. Afebrile and hemodynamically stable.   5/5: more distended; more pain and more nausea. Planned for ex lap today.   5/6: s/p ex lap. Post op hypotension and rise in Cr. IVF boluses ordered to maintain MAP. Transferred to CICU for closer monitoring.   5/7: improved BP. Improved Cr. Improved Urine output. Pain controlled. No nausea. Intermittent confusion but easily re-oriented.   5/8: in restraints, not in acute distress   5/9: more alert  5/10 -seen in the morning,  alert,  knows his name, states he wants to sleep some more. denies abdo pain. ROS limited due to underlying dementia/delirium but he does not appear to be in acute distress   5/11 - seen this morning - shortness of breath, on NRM - gave lasix upgraded to CICU for bipap      Vital Signs Last 24 Hrs  Vital Signs Last 24 Hrs  T(C): 36.3 (12 May 2022 07:52), Max: 36.5 (11 May 2022 08:54)  T(F): 97.3 (12 May 2022 07:52), Max: 97.7 (11 May 2022 08:54)  HR: 80 (12 May 2022 07:00) (66 - 93)  BP: 105/74 (12 May 2022 07:00) (100/59 - 138/107)  BP(mean): 80 (12 May 2022 07:00) (66 - 113)  RR: 27 (11 May 2022 23:00) (18 - 37)  SpO2: 96% (12 May 2022 07:00) (86% - 97%)  Constitutional: alert confused  HEENT:  EOMI, PERRLA; Dry mucous membranes  Neck: supple  Back: no tenderness  Respiratory: few crackles lung bases  Cardiovascular: S1S2 regular, no murmurs  Abdomen: appropriately tenders soft; ; hypoactive bowel sounds; Midline surgical incision site with soft surgical dressing; clean and dry  : Kc   Musculoskeletal: no muscle tenderness, no joint swelling or tenderness  Extremities: no pedal edema  Neurological: AxOx2, moving all extremities, no focal deficits  Skin:  no rash      RAD:  < from: US Duplex Venous Upper Ext Ltd, Right (05.08.22 @ 13:31) >  No evidence of right upper extremity deep venous thrombosis.  Extensive subcutaneous edema.    < from: CT Abdomen and Pelvis w/ Oral Cont (05.04.22 @ 14:53) >  Worsening small bowel obstruction with slight increase in dilatation.   Increasing ascites.  Developing small bilateral pleural effusions.    LABS: All Labs Reviewed:                      11.4   17.44 )-----------( 116      ( 11 May 2022 07:03 )             34.4   141  |  114<H>  |  49<H>  ----------------------------<  135<H>  4.1   |  18<L>  |  1.39<H>  Ca    9.6      11 May 2022 05:40  Phos  3.4     05-11  Mg     2.1     05-11  TPro  5.4<L>  /  Alb  1.7<L>  /  TBili  0.4  /  DBili  x   /  AST  35  /  ALT  25  /  AlkPhos  104  05-11      MEDS:   chlorhexidine 4% Liquid 1 Application(s) Topical <User Schedule>  meropenem  IVPB 1000 milliGRAM(s) IV Intermittent every 12 hours  metoprolol tartrate Injectable 5 milliGRAM(s) IV Push every 6 hours  ondansetron Injectable 4 milliGRAM(s) IV Push every 8 hours PRN  Parenteral Nutrition - Adult 1 Each TPN Continuous <Continuous>  prochlorperazine   Injectable 10 milliGRAM(s) IntraMuscular every 8 hours PRN  silver sulfADIAZINE 1% Cream 1 Application(s) Topical daily             CC: Patient is a 89y old  Male who presents with a chief complaint of abdominal pain   89M hx colon ca s/p resection ~15y ago here with mid-epigastric pain x 2d. Mild nausea, no vomiting/diarrhea/constipation. No fever/chills. No clear precipitating factor. Mild tenderness "but it also hurts when I'm not touching it". Claims he had a BM yesterday. Has not really been eating due to pain. The patient had ventral hernia repair in July 2021.     Subjective:   5/2: Abdominal Pain improved. No nausea or vomiting. Passing a little flatus., Denies fever, chills, dizziness, chest pain, SOB  5/3: Abdominal pain similar to yesterday NGT failure/removal overnight. No nausea. Passing a little flatus. Still NPO and on IVF.   5/4: Abdominal pain unchanged; Off NGT; Repeat CT ->worsening SBO and ascites. Afebrile and hemodynamically stable.   5/5: more distended; more pain and more nausea. Planned for ex lap today.   5/6: s/p ex lap. Post op hypotension and rise in Cr. IVF boluses ordered to maintain MAP. Transferred to CICU for closer monitoring.   5/7: improved BP. Improved Cr. Improved Urine output. Pain controlled. No nausea. Intermittent confusion but easily re-oriented.   5/8: in restraints, not in acute distress   5/9: more alert  5/10 -seen in the morning,  alert,  knows his name, states he wants to sleep some more. denies abdo pain. ROS limited due to underlying dementia/delirium but he does not appear to be in acute distress   5/11 - seen this morning - shortness of breath, on NRM - gave lasix upgraded to CICU for bipap  5/12 - this morning tolerating bipap and looking more comfortable       Vital Signs Last 24 Hrs  Vital Signs Last 24 Hrs  T(C): 36.3 (12 May 2022 07:52), Max: 36.5 (11 May 2022 08:54)  T(F): 97.3 (12 May 2022 07:52), Max: 97.7 (11 May 2022 08:54)  HR: 80 (12 May 2022 07:00) (66 - 93)  BP: 105/74 (12 May 2022 07:00) (100/59 - 138/107)  BP(mean): 80 (12 May 2022 07:00) (66 - 113)  RR: 27 (11 May 2022 23:00) (18 - 37)  SpO2: 96% (12 May 2022 07:00) (86% - 97%)  Constitutional: alert on bipap, tolerating it well   HEENT:  EOMI, PERRLA; Dry mucous membranes  Neck: supple  Back: no tenderness  Respiratory: few crackles lung bases  Cardiovascular: S1S2 regular, no murmurs  Abdomen: appropriately tenders soft; ; hypoactive bowel sounds; Midline surgical incision site with soft surgical dressing; clean and dry  : Kc   Musculoskeletal: no muscle tenderness, no joint swelling or tenderness  Extremities: no pedal edema  Neurological: AxOx2, moving all extremities, no focal deficits  Skin:  no rash      RAD:  < from: US Duplex Venous Upper Ext Ltd, Right (05.08.22 @ 13:31) >  No evidence of right upper extremity deep venous thrombosis.  Extensive subcutaneous edema.    < from: CT Abdomen and Pelvis w/ Oral Cont (05.04.22 @ 14:53) >  Worsening small bowel obstruction with slight increase in dilatation.   Increasing ascites.  Developing small bilateral pleural effusions.    LABS: All Labs Reviewed:                        11.7   15.74 )-----------( 133      ( 12 May 2022 06:51 )             35.5    147<H>  |  117<H>  |  56<H>  ----------------------------<  112<H>  3.8   |  21<L>  |  1.28  TPro  5.4<L>  /  Alb  1.7<L>  /  TBili  0.4  /  DBili  x   /  AST  35  /  ALT  25  /  AlkPhos  104  05-11      chlorhexidine 4% Liquid 1 Application(s) Topical <User Schedule>  dextrose 5%. 1000 milliLiter(s) IV Continuous <Continuous>  fat emulsion (Fish Oil and Plant Based) 20% Infusion 0.92 Gm/kG/Day IV Continuous <Continuous>  heparin   Injectable 5000 Unit(s) SubCutaneous every 12 hours  meropenem  IVPB 1000 milliGRAM(s) IV Intermittent every 12 hours  metoprolol tartrate Injectable 5 milliGRAM(s) IV Push every 6 hours  ondansetron Injectable 4 milliGRAM(s) IV Push every 8 hours PRN  Parenteral Nutrition - Adult 1 Each TPN Continuous <Continuous>  prochlorperazine   Injectable 10 milliGRAM(s) IntraMuscular every 8 hours PRN  silver sulfADIAZINE 1% Cream 1 Application(s) Topical daily

## 2022-05-12 NOTE — CHART NOTE - NSCHARTNOTEFT_GEN_A_CORE
Clinical Nutrition PPN Follow Up Note    *89 yo male admitted with high grad distal SBO, YAMINI on CKD now s/p ex lap on 5/5.  hypercalcemia 2/2 dehydration and primary hyperparathyroidism. Pt started on PPN on 5/5 2/2 SBO and extended time NPO.  *5/9: As per surgery on 5/6: "s/p Expl lap, SBR for high grade SBO. Pt likely volume depleted with low urine output, elevated CR. Will bolus IVF to treat low UO."  Nephrology is following. Pt transferred to CICU for closer monitoring.   NGT pulled out on 5/5, pt refused replacement. Urine output improving. S/P SLP evaluation with recommendations regular consistency with thin liquids when medically feasible    **5/11: S/P RRT on 5/10 for hypoxia; encephalopathy 2/2 hypoxia; on venti-mask, pt now NPO.  CT chest shows consistent with b/l infiltrates.  worsening leukocytosis.  d/w Surgery PA, PN on hold for 5/10 and 5/11, lasix being given.    *of note, pt had urine output of 1300mL plus 1 undocumented urine episode with 4 episodes of diarrhea in the past 24hours.  (+1) Lt/Rt ankle, (+2) Lt/Rt arm edema documented.    ********pt was infused IVF at 80mL/hr on 5/8, 5/9, and 5/10 (~1900mL additional fluid volume).    *current status: continues NPO on bipap until resp and mental status improve; s/p transferred to CICU. PPN on hold x 2 days but to re-start today as per Dr. Pete. I&O's not being appropriately/ accurately documented so unable to determine accurate fluid balance - will start total PN volume at 2000 mL and monitor/ adjust tomorrow based on labs/ I&O's. Strict and accurate I&O's are essential when pt is on PPN.   Now pending palliative care consult - highly rec'd to confirm goals of care regarding long-term nutrition support.    *labs reviewed; Hypernatremia, will start with 154 mEq in bag and adjust based on tomorrow's labs.  K low-end of normal, will start with 60 mEq in PN bag and adjust based on tomorrow's labs.  Mg and Phos at high-end normal, will start low in bag and adjust based on labs prn.   POCT WNL.  Last Bilirubin total WNL for trace elements.   Last triglyceride level WNL for 50g lipids.    Monitor acid-base balance as Cl elevated and CO2 depressed.     05-12    147<H>  |  117<H>  |  56<H>  ----------------------------<  112<H>  3.8   |  21<L>  |  1.28    Ca    9.8      12 May 2022 06:51  Phos  4.3     05-12  Mg     2.1     05-12    TPro  5.4<L>  /  Alb  1.7<L>  /  TBili  0.4  /  DBili  x   /  AST  35  /  ALT  25  /  AlkPhos  104  05-11      Lipid Panel: Date/Time: 05-06-22 @ 05:12  Triglycerides, Serum: 98  POCT Blood Glucose.: 167 mg/dL (11 May 2022 07:54)    *pertinent meds: Dextrose 5% IV - rec'd to d/c while pn PPN, meropenem, metoprolol, zofran, prochlorperazine     *I&O's Detail    11 May 2022 07:01  -  12 May 2022 07:00  --------------------------------------------------------  IN:  Total IN: 0 mL    OUT:    Indwelling Catheter - Urethral (mL): 600 mL  Total OUT: 600 mL    Total NET: -600 mL  * fluid status: negative net volume. no PN or IVF documented.  will monitor and adjust PN prn.  *BM (+) 5/11; loose.      *Mahesh Score of 9; stg 1 PI on sacrum documented. (+2) Lt/Rt arm, (+1) Lt/Rt ankle edema documented.    *Malnutrition: Pt continues to meet criteria for severe malnutrition in acute on chronic illness r/t decreased ability to consume sufficient nutr 2/2 SBO on CA AEB meeting <50% of ENN x 7 days, severe muscle/ fat wasting    Estimated Needs: based on 55Kg (wt from 5/5, bedscale wt obtained by RD)  Calories: 4957-1918 Kcal (30-35 Kcal/Kg)  Protein:   g protein (1.5-2g/Kg)  Fluids:  5070-0570 mL (30-35mL/Kg)    Diet, NPO    Weight History: no significant changes x 2 days (admission weight may be inaccurate as there is a large discrepancy)   66kg (5/8)  66Kg (5/7)  Height (cm): 175.3 (05-01-22 @ 08:41)  Weight (kg): 54.4 (05-01-22 @ 08:41)  BMI (kg/m2): 17.7 (05-01-22 @ 08:41)  BSA (m2): 1.66 (05-01-22 @ 08:41)    Recommendations:  1) Daily weights  2) Strict I & O's  3) Daily lyte checks  4) Weekly triglycerides/LFT checks  5) POCT q6 hours - maintain BG levels between 140- 180 mg/dL  6) Confirm goals of care regarding long-term nutrition support     *will monitor and adjust treatement plan prn*  Deana Glynn, MS, RDN, [phone] Clinical Nutrition PPN Follow Up Note    *89 yo male admitted with high grad distal SBO, YAMINI on CKD now s/p ex lap on 5/5.  hypercalcemia 2/2 dehydration and primary hyperparathyroidism. Pt started on PPN on 5/5 2/2 SBO and extended time NPO.  *5/9: As per surgery on 5/6: "s/p Expl lap, SBR for high grade SBO. Pt likely volume depleted with low urine output, elevated CR. Will bolus IVF to treat low UO."  Nephrology is following. Pt transferred to CICU for closer monitoring.   NGT pulled out on 5/5, pt refused replacement. Urine output improving. S/P SLP evaluation with recommendations regular consistency with thin liquids when medically feasible    **5/11: S/P RRT on 5/10 for hypoxia; encephalopathy 2/2 hypoxia; on venti-mask, pt now NPO.  CT chest shows consistent with b/l infiltrates.  worsening leukocytosis.  d/w Surgery PA, PN on hold for 5/10 and 5/11, lasix being given.    *of note, pt had urine output of 1300mL plus 1 undocumented urine episode with 4 episodes of diarrhea in the past 24hours.  (+1) Lt/Rt ankle, (+2) Lt/Rt arm edema documented.    ********pt was infused IVF at 80mL/hr on 5/8, 5/9, and 5/10 (~1900mL additional fluid volume).    *current status: continues NPO on bipap until resp and mental status improve; s/p transferred to CICU. PPN on hold x 2 days but to re-start today as per Dr. Pete. I&O's not being appropriately/ accurately documented so unable to determine accurate fluid balance - will start total PN volume at 2000 mL and monitor/ adjust tomorrow based on labs/ I&O's. Strict and accurate I&O's are essential when pt is on PPN.   Now pending palliative care consult - highly rec'd to confirm goals of care regarding long-term nutrition support.    *labs reviewed; Hypernatremia, will start with 154 mEq in bag and adjust based on tomorrow's labs.  K low-end of normal, will start with 60 mEq in PN bag and adjust based on tomorrow's labs.  Mg and Phos at high-end normal, will start low in bag and adjust based on labs prn. Corrected Ca elevated.  POCT WNL.  Last Bilirubin total WNL for trace elements.   Last triglyceride level WNL for 50g lipids.    Monitor acid-base balance as Cl elevated and CO2 depressed.     05-12    147<H>  |  117<H>  |  56<H>  ----------------------------<  112<H>  3.8   |  21<L>  |  1.28    Ca    9.8      12 May 2022 06:51  Phos  4.3     05-12  Mg     2.1     05-12    TPro  5.4<L>  /  Alb  1.7<L>  /  TBili  0.4  /  DBili  x   /  AST  35  /  ALT  25  /  AlkPhos  104  05-11      Lipid Panel: Date/Time: 05-06-22 @ 05:12  Triglycerides, Serum: 98  POCT Blood Glucose.: 167 mg/dL (11 May 2022 07:54)    *pertinent meds: Dextrose 5% IV - rec'd to d/c while pn PPN, meropenem, metoprolol, zofran, prochlorperazine     *I&O's Detail    11 May 2022 07:01  -  12 May 2022 07:00  --------------------------------------------------------  IN:  Total IN: 0 mL    OUT:    Indwelling Catheter - Urethral (mL): 600 mL  Total OUT: 600 mL    Total NET: -600 mL  * fluid status: negative net volume. no PN or IVF documented.  will monitor and adjust PN prn.  *BM (+) 5/11; loose.      *Mahesh Score of 9; stg 1 PI on sacrum documented. (+2) Lt/Rt arm, (+1) Lt/Rt ankle edema documented.    *Malnutrition: Pt continues to meet criteria for severe malnutrition in acute on chronic illness r/t decreased ability to consume sufficient nutr 2/2 SBO on CA AEB meeting <50% of ENN x 7 days, severe muscle/ fat wasting    Estimated Needs: based on 55Kg (wt from 5/5, bedscale wt obtained by RD)  Calories: 8091-9694 Kcal (30-35 Kcal/Kg)  Protein:   g protein (1.5-2g/Kg)  Fluids:  8469-8535 mL (30-35mL/Kg)    Diet, NPO    Weight History: no significant changes x 2 days (admission weight may be inaccurate as there is a large discrepancy)   66kg (5/8)  66Kg (5/7)  Height (cm): 175.3 (05-01-22 @ 08:41)  Weight (kg): 54.4 (05-01-22 @ 08:41)  BMI (kg/m2): 17.7 (05-01-22 @ 08:41)  BSA (m2): 1.66 (05-01-22 @ 08:41)    PPN Recommendations: via [id number] venous line  Total Volume: 2000 mL  80 g  Amino Acids  100 g Dextrose  50 g Lipids 20%  121 mEq Sodium Chloride  26 mEq Sodium Acetate  5 mmol Sodium Phosphate  43 mEq Potassium Chloride  17 mEq Potassium Acetate  0 mmol Potassium Phosphate  0 mEq Calcium Gluconate (Corrected Ca elevated - do NOT replete outside bag)  5 mEq Magnesium Sulfate  100 mg Thiamine  1 ml Trace   10 ml MVI    Total Calories:     1160     (Meets    70%  of  Estimated Energy needs  and     73%  Protein needs)     (osmolarity <900)      Recommendations:  1) Daily weights  2) Strict I & O's  3) Daily lyte checks - including Mg and Phos  4) Weekly triglycerides/LFT checks  5) POCT q6 hours - maintain BG levels between 140- 180 mg/dL  6) Confirm goals of care regarding long-term nutrition support     *will monitor and adjust treatement plan prn*  Deana Glynn, MS, RDN, [phone]

## 2022-05-13 NOTE — CHART NOTE - NSCHARTNOTEFT_GEN_A_CORE
Clinical Nutrition PPN Follow Up Note    *89 yo male admitted with high grad distal SBO, YAMINI on CKD now s/p ex lap on 5/5.  hypercalcemia 2/2 dehydration and primary hyperparathyroidism. Pt started on PPN on 5/5 2/2 SBO and extended time NPO.  *5/9: As per surgery on 5/6: "s/p Expl lap, SBR for high grade SBO. Pt likely volume depleted with low urine output, elevated CR. Will bolus IVF to treat low UO."  Nephrology is following. Pt transferred to CICU for closer monitoring.   NGT pulled out on 5/5, pt refused replacement. Urine output improving. S/P SLP evaluation with recommendations regular consistency with thin liquids when medically feasible    **5/11: S/P RRT on 5/10 for hypoxia; encephalopathy 2/2 hypoxia; on venti-mask, pt now NPO.  CT chest shows consistent with b/l infiltrates.  worsening leukocytosis.  d/w Surgery PA, PN on hold for 5/10 and 5/11, lasix being given.    *of note, pt had urine output of 1300mL plus 1 undocumented urine episode with 4 episodes of diarrhea in the past 24hours.  (+1) Lt/Rt ankle, (+2) Lt/Rt arm edema documented.  ********pt was infused IVF at 80mL/hr on 5/8, 5/9, and 5/10 (~1900mL additional fluid volume).    *5/12: continues NPO on bipap until resp and mental status improve; s/p transferred to CICU. PPN on hold x 2 days but to re-start today as per Dr. Pete. I&O's not being appropriately/ accurately documented so unable to determine accurate fluid balance - will start total PN volume at 2000 mL and monitor/ adjust tomorrow based on labs/ I&O's. Strict and accurate I&O's are essential when pt is on PPN.   Now pending palliative care consult - highly rec'd to confirm goals of care regarding long-term nutrition support.    *current status: remains NPO and on bipap. Is high risk for aspiration. Currently w/ post-op pneumonia. Day #8 of PPN - Would consider placing central line to give TPN and meet >80% ENN. Lasix on hold. C/w hypernatremia    *labs reviewed; C/w Hypernatremia, will decrease Na in bag and adjust based on tomorrow's labs.  K low-end of normal, will increase in bag.  Mg and Phos WNL - Phos downtrending, will monitor and increase in bag prn.     Corrected Ca elevated.    POCT WNL.    Last Bilirubin total WNL for trace elements.   Last triglyceride level WNL for 50g lipids - pending new level.    05-13    147<H>  |  114<H>  |  60<H>  ----------------------------<  136<H>  3.6   |  28  |  1.33<H>    Ca    10.0      13 May 2022 06:31  Phos  3.0     05-13  Mg     2.1     05-13    TPro  4.9<L>  /  Alb  1.6<L>  /  TBili  0.4  /  DBili  x   /  AST  18  /  ALT  19  /  AlkPhos  90  05-13      Lipid Panel: Date/Time: 05-06-22 @ 05:12  Triglycerides, Serum: 98  POCT Blood Glucose.: 167 mg/dL (11 May 2022 07:54)    *pertinent meds: Dextrose 5% IV - rec'd to d/c while pn PPN, meropenem, metoprolol, zofran, prochlorperazine     *I&O's Detail    12 May 2022 07:01  -  13 May 2022 07:00  --------------------------------------------------------  IN:  Total IN: 0 mL    OUT:    Indwelling Catheter - Urethral (mL): 900 mL  Total OUT: 900 mL    Total NET: -900 mL  * fluid status: negative net volume. no PN or IVF documented.  will monitor and adjust PN prn.   *BM (+) 5/11; loose.  No new BM's doc'd. Pt not on bowel regimen.     *Mahesh Score of 13; stg 1 PI on sacrum documented. (+2) Lt/Rt arm edema documented.    *Malnutrition: Pt continues to meet criteria for severe malnutrition in acute on chronic illness r/t decreased ability to consume sufficient nutr 2/2 SBO on CA AEB meeting <50% of ENN x 7 days, severe muscle/ fat wasting    Estimated Needs: based [facility] 55K[facility]  Calories: 3941-1042 Kcal (30-35 Kcal/Kg)  Protein:   g protein (1.5-2g/Kg)  Fluids:  8510-2010 mL (30-35mL/Kg)    Diet, NPO    Weight History: no significant changes x 2 days (admission weight may be inaccurate as there is a large discrepancy)   66kg (5/8)  66Kg (5/7)  Height (cm): 175.3 (05-01-22 @ 08:41)  Weight (kg): 54.4 (05-01-22 @ 08:41)  BMI (kg/m2): 17.7 (05-01-22 @ 08:41)  BSA (m2): 1.66 (05-01-22 @ 08:41)    PPN Recommendations: via peripheral venous line  Total Volume: 2000 mL  80 g  Amino Acids  100 g Dextrose  50 g Lipids 20%  121 mEq Sodium Chloride  22 mEq Sodium Acetate  5 mmol Sodium Phosphate  44 mEq Potassium Chloride  21 mEq Potassium Acetate  0 mmol Potassium Phosphate  0 mEq Calcium Gluconate    (Corrected Ca elevated - do NOT replete outside bag)  5 mEq Magnesium Sulfate  100 mg Thiamine  1 ml Trace   10 ml MVI    Total Calories:     1160     (Meets    70%  of  Estimated Energy needs  and     73%  Protein needs)     (osmolarity <900)      Recommendations:  1) Daily weights  2) Strict and accurate I & O's to closely monitor fluid balance  3) Daily lyte checks - including Mg and Phos  4) Weekly triglycerides/LFT checks  5) POCT q6 hours - maintain BG levels between 140- 180 mg/dL  6) Confirm goals of care regarding long-term nutrition support   7) Consider placing central line to provide TPN and meet 100% ENN - It is now day #8 of PPN and meeting <80% ENN    *will monitor and adjust treatement plan prn*  Deana Glynn, MS, RDN, [phone]

## 2022-05-13 NOTE — PROGRESS NOTE ADULT - SUBJECTIVE AND OBJECTIVE BOX
Subjective:    pat better, on bipap 10/5-60%, lying in bed.    Home Medications:  Advil Dual Action With Acetaminophen 250 mg-125 mg oral tablet: 2 tab(s) orally once a day, As Needed (01 May 2022 21:57)  alfuzosin 10 mg oral tablet, extended release: 1 tab(s) orally once a day (01 May 2022 21:57)  amLODIPine 5 mg oral tablet: 1 tab(s) orally once a day (01 May 2022 21:57)  azelastine nasal: nasal once a day (01 May 2022 21:57)  hydroCHLOROthiazide 25 mg oral tablet: 1 tab(s) orally once a day (01 May 2022 21:57)  losartan 25 mg oral tablet: 1/2  tab(s) orally once a day (01 May 2022 21:57)  metoprolol succinate 25 mg oral tablet, extended release: 1 tab(s) orally once a day (01 May 2022 21:57)    MEDICATIONS  (STANDING):  chlorhexidine 4% Liquid 1 Application(s) Topical <User Schedule>  dextrose 5%. 1000 milliLiter(s) (50 mL/Hr) IV Continuous <Continuous>  dextrose 5%. 1000 milliLiter(s) (50 mL/Hr) IV Continuous <Continuous>  fat emulsion (Fish Oil and Plant Based) 20% Infusion 0.92 Gm/kG/Day (20.83 mL/Hr) IV Continuous <Continuous>  fat emulsion (Fish Oil and Plant Based) 20% Infusion 0.92 Gm/kG/Day (20.83 mL/Hr) IV Continuous <Continuous>  heparin   Injectable 5000 Unit(s) SubCutaneous every 12 hours  meropenem  IVPB 1000 milliGRAM(s) IV Intermittent every 12 hours  methylPREDNISolone sodium succinate Injectable 40 milliGRAM(s) IV Push every 8 hours  metoprolol tartrate Injectable 5 milliGRAM(s) IV Push every 6 hours  Parenteral Nutrition - Adult 1 Each (83 mL/Hr) TPN Continuous <Continuous>  Parenteral Nutrition - Adult 1 Each (83 mL/Hr) TPN Continuous <Continuous>  silver sulfADIAZINE 1% Cream 1 Application(s) Topical daily    MEDICATIONS  (PRN):  ondansetron Injectable 4 milliGRAM(s) IV Push every 8 hours PRN Nausea and/or Vomiting  prochlorperazine   Injectable 10 milliGRAM(s) IntraMuscular every 8 hours PRN Nasuea/Vomiting      Allergies    amoxicillin (Other (Severe))  penicillin (Unknown)    Intolerances        Vital Signs Last 24 Hrs  T(C): 36.9 (13 May 2022 08:00), Max: 36.9 (13 May 2022 08:00)  T(F): 98.5 (13 May 2022 08:00), Max: 98.5 (13 May 2022 08:00)  HR: 90 (13 May 2022 12:24) (83 - 100)  BP: 141/75 (13 May 2022 08:00) (101/57 - 158/96)  BP(mean): 92 (13 May 2022 08:00) (67 - 114)  RR: --  SpO2: 94% (13 May 2022 12:24) (81% - 96%)      PHYSICAL EXAMINATION:    NECK:  Supple. No lymphadenopathy. Jugular venous pressure not elevated. Carotids equal.   HEART:   The cardiac impulse has a normal quality. Reg., Nl S1 and S2.  There are no murmurs, rubs or gallops noted  CHEST:  Chest crackles to auscultation. Normal respiratory effort.  ABDOMEN:  Soft and nontender.   EXTREMITIES:  There is no edema.       LABS:                        12.7   23.18 )-----------( 216      ( 13 May 2022 06:31 )             38.9     05-13    147<H>  |  114<H>  |  60<H>  ----------------------------<  136<H>  3.6   |  28  |  1.33<H>    Ca    10.0      13 May 2022 06:31  Phos  3.0     05-13  Mg     2.1     05-13    TPro  4.9<L>  /  Alb  1.6<L>  /  TBili  0.4  /  DBili  x   /  AST  18  /  ALT  19  /  AlkPhos  90  05-13

## 2022-05-13 NOTE — PROGRESS NOTE ADULT - ASSESSMENT
Post op pneumonia, s/p SBR. Likely aspiration pneumonia. Cont IV abx as per ID. Pulmonary follow up.

## 2022-05-13 NOTE — PROGRESS NOTE ADULT - ASSESSMENT
89M hx colon ca s/p resection in 2004 here with severe abdominal  pain. Symptoms started 2days ago and got worse.  Mild nausea, no vomiting/diarrhea/constipation. No fever/chills. No clear precipitating factor. Mild tenderness "but it also hurts when I'm not touching it". Claims he had a BM yesterday. Has not really been eating due to pain. The patient had ventral hernia repair in July 2021. CT abd shows high grade distal SBO.       A/P  SBO  YAMINI on CKD III due to nausea/vomiting and decreased PO intake POA and then Resolved then subsequent recurrence with post op YAMINI likely due to hypovolemia/hypotension improved  Now with Acute Hypoxic resp failure like to fluid overload, and acute diastolic CHF   Leukocytosis  post operative - risk of aspiration pneumonia, bacterial translocation in the gut   Anemia likely hemodilutional + blood loss related to surgery. More likely hemodilutional.   Hypercalcemia (multifactorial: dehydration + primary hyperparathyroidism)  HTN  BPH  SSS s/p PPM  toxic metabolic encephalopathy multifactorial   urinary retention and decreased urinary output  hypernatremia    Plan:  -admitted to surgery, originally on tele, then 1N, now being monitored on CICU  - Pt was seen on 1N , in acute hypoxic resp failure, stopped PPN, gave lasix, was on 100 NRM so placed in CICU for trial of BIPAP as tolerated on 5/11 -   - is tolerating BIPAP and will cont for now   - low doses of lasix as BP and Cr allow, Cardiology recs noted - consider farxiag/jardiance   - Hypernatremia - place on low dose D5W for 12h   - on empiric abx coverage post op due to risk of colonic bacterial translocation and possible aspiration PNA - appreciate ID help - change to meropenem   - CT Chest reviewed - biateral infiltrates/pulm edema   - Normally on BB ARB amlodipine - now NPO and will place on IV BB alone   -  Flomax when able to take PO   - would hold PO diet for now until resp and mental status improve; cont PPN per surgery       - I had a conversation with Ana Galvez 5/11 - we talked about CPR and ETT and I advised that at his age and with the comorbidities he would not benefit from it and should be allowed to die naturally. I also talked to her about the Palliative care team as this is the first conversation of this kind she has had and she is open to talking to [facility] about GOC and other care needs that may arise - Pall Care consulted     Ty for consult, pls call with q        89M hx colon ca s/p resection in 2004 here with severe abdominal  pain. Symptoms started 2days ago and got worse.  Mild nausea, no vomiting/diarrhea/constipation. No fever/chills. No clear precipitating factor. Mild tenderness "but it also hurts when I'm not touching it". Claims he had a BM yesterday. Has not really been eating due to pain. The patient had ventral hernia repair in July 2021. CT abd shows high grade distal SBO.       A/P  SBO  YAMINI on CKD III due to nausea/vomiting and decreased PO intake POA and then Resolved then subsequent recurrence with post op YAMINI likely due to hypovolemia/hypotension improved  Now with Acute Hypoxic resp failure like to fluid overload, and acute diastolic CHF   Leukocytosis  post operative - risk of aspiration pneumonia, bacterial translocation in the gut   Anemia likely hemodilutional + blood loss related to surgery. More likely hemodilutional.   Hypercalcemia (multifactorial: dehydration + primary hyperparathyroidism)  HTN  BPH  SSS s/p PPM  toxic metabolic encephalopathy multifactorial   urinary retention and decreased urinary output  hypernatremia    Plan:  -admitted to surgery, originally on tele, then 1N, now being monitored on CICU  - Pt was seen on 1N , in acute hypoxic resp failure, stopped PPN, gave lasix, was on 100 NRM so placed in CICU for trial of BIPAP as tolerated on 5/11 -   - is tolerating BIPAP and will cont for now. on IV solu-medrol, titrating down the O2  - low doses of lasix as BP and Cr allow, Cardiology recs noted - consider farxiga/jardiance   - Hypernatremia - place on low dose D5W for 12h today also   - on empiric abx coverage post op due to risk of colonic bacterial translocation and possible aspiration PNA - appreciate ID help - change to meropenem   - CT Chest reviewed - biateral infiltrates/pulm edema   - Normally on BB ARB amlodipine - now NPO and will place on IV BB alone   -  Flomax when able to take PO   - would hold PO diet for now until resp and mental status improve; cont PPN per surgery     Ty for consult, pls call with q        89M hx colon ca s/p resection in 2004 here with severe abdominal  pain. Symptoms started 2days ago and got worse.  Mild nausea, no vomiting/diarrhea/constipation. No fever/chills. No clear precipitating factor. Mild tenderness "but it also hurts when I'm not touching it". Claims he had a BM yesterday. Has not really been eating due to pain. The patient had ventral hernia repair in July 2021. CT abd shows high grade distal SBO.       A/P  SBO  YAMINI on CKD III due to nausea/vomiting and decreased PO intake POA and then Resolved then subsequent recurrence with post op YAMINI likely due to hypovolemia/hypotension improved  Now with Acute Hypoxic resp failure like to fluid overload, and acute diastolic CHF   Leukocytosis  post operative - risk of aspiration pneumonia, bacterial translocation in the gut   Anemia likely hemodilutional + blood loss related to surgery. More likely hemodilutional.   Hypercalcemia (multifactorial: dehydration + primary hyperparathyroidism)  HTN  BPH  SSS s/p PPM  toxic metabolic encephalopathy multifactorial   urinary retention and decreased urinary output  hypernatremia    Plan:  -admitted to surgery, originally on tele, then 1N, now being monitored on CICU  - Pt was seen on 1N , in acute hypoxic resp failure, stopped PPN, gave lasix, was on 100 NRM so placed in CICU for trial of BIPAP as tolerated on 5/11 -   - is tolerating BIPAP and will cont for now. on IV solu-medrol, titrating down the O2  - low doses of lasix as BP and Cr allow, Cardiology recs noted - consider farxiga/jardiance   - Hypernatremia - place on low dose D5W 50cc/h for 24h  - on empiric abx coverage post op due to risk of colonic bacterial translocation and possible aspiration PNA - leukocytosis worse today but cont meropenem   - CT Chest reviewed - biateral infiltrates/pulm edema; CXR in AM   - Normally on BB ARB amlodipine - now NPO and will place on IV BB alone   -  Flomax when able to take PO   - would hold PO diet for now until resp and mental status improve; cont PPN per surgery     Ty for consult, pls call with q

## 2022-05-13 NOTE — PROGRESS NOTE ADULT - SUBJECTIVE AND OBJECTIVE BOX
CC: Patient is a 89y old  Male who presents with a chief complaint of abdominal pain   89M hx colon ca s/p resection ~15y ago here with mid-epigastric pain x 2d. Mild nausea, no vomiting/diarrhea/constipation. No fever/chills. No clear precipitating factor. Mild tenderness "but it also hurts when I'm not touching it". Claims he had a BM yesterday. Has not really been eating due to pain. The patient had ventral hernia repair in July 2021.     Subjective:   5/2: Abdominal Pain improved. No nausea or vomiting. Passing a little flatus., Denies fever, chills, dizziness, chest pain, SOB  5/3: Abdominal pain similar to yesterday NGT failure/removal overnight. No nausea. Passing a little flatus. Still NPO and on IVF.   5/4: Abdominal pain unchanged; Off NGT; Repeat CT ->worsening SBO and ascites. Afebrile and hemodynamically stable.   5/5: more distended; more pain and more nausea. Planned for ex lap today.   5/6: s/p ex lap. Post op hypotension and rise in Cr. IVF boluses ordered to maintain MAP. Transferred to CICU for closer monitoring.   5/7: improved BP. Improved Cr. Improved Urine output. Pain controlled. No nausea. Intermittent confusion but easily re-oriented.   5/8: in restraints, not in acute distress   5/9: more alert  5/10 -seen in the morning,  alert,  knows his name, states he wants to sleep some more. denies abdo pain. ROS limited due to underlying dementia/delirium but he does not appear to be in acute distress   5/11 - seen this morning - shortness of breath, on NRM - gave lasix upgraded to CICU for bipap  5/12 - this morning tolerating bipap and looking more comfortable       Vital Signs Last 24 Hrs  Vital Signs Last 24 Hrs  T(C): 36.3 (12 May 2022 07:52), Max: 36.5 (11 May 2022 08:54)  T(F): 97.3 (12 May 2022 07:52), Max: 97.7 (11 May 2022 08:54)  HR: 80 (12 May 2022 07:00) (66 - 93)  BP: 105/74 (12 May 2022 07:00) (100/59 - 138/107)  BP(mean): 80 (12 May 2022 07:00) (66 - 113)  RR: 27 (11 May 2022 23:00) (18 - 37)  SpO2: 96% (12 May 2022 07:00) (86% - 97%)  Constitutional: alert on bipap, tolerating it well   HEENT:  EOMI, PERRLA; Dry mucous membranes  Neck: supple  Back: no tenderness  Respiratory: few crackles lung bases  Cardiovascular: S1S2 regular, no murmurs  Abdomen: appropriately tenders soft; ; hypoactive bowel sounds; Midline surgical incision site with soft surgical dressing; clean and dry  : Kc   Musculoskeletal: no muscle tenderness, no joint swelling or tenderness  Extremities: no pedal edema  Neurological: AxOx2, moving all extremities, no focal deficits  Skin:  no rash      RAD:  < from: US Duplex Venous Upper Ext Ltd, Right (05.08.22 @ 13:31) >  No evidence of right upper extremity deep venous thrombosis.  Extensive subcutaneous edema.    < from: CT Abdomen and Pelvis w/ Oral Cont (05.04.22 @ 14:53) >  Worsening small bowel obstruction with slight increase in dilatation.   Increasing ascites.  Developing small bilateral pleural effusions.    LABS: All Labs Reviewed:                        11.7   15.74 )-----------( 133      ( 12 May 2022 06:51 )             35.5    147<H>  |  117<H>  |  56<H>  ----------------------------<  112<H>  3.8   |  21<L>  |  1.28  TPro  5.4<L>  /  Alb  1.7<L>  /  TBili  0.4  /  DBili  x   /  AST  35  /  ALT  25  /  AlkPhos  104  05-11      chlorhexidine 4% Liquid 1 Application(s) Topical <User Schedule>  dextrose 5%. 1000 milliLiter(s) IV Continuous <Continuous>  fat emulsion (Fish Oil and Plant Based) 20% Infusion 0.92 Gm/kG/Day IV Continuous <Continuous>  heparin   Injectable 5000 Unit(s) SubCutaneous every 12 hours  meropenem  IVPB 1000 milliGRAM(s) IV Intermittent every 12 hours  metoprolol tartrate Injectable 5 milliGRAM(s) IV Push every 6 hours  ondansetron Injectable 4 milliGRAM(s) IV Push every 8 hours PRN  Parenteral Nutrition - Adult 1 Each TPN Continuous <Continuous>  prochlorperazine   Injectable 10 milliGRAM(s) IntraMuscular every 8 hours PRN  silver sulfADIAZINE 1% Cream 1 Application(s) Topical daily                 CC: Patient is a 89y old  Male who presents with a chief complaint of abdominal pain   89M hx colon ca s/p resection ~15y ago here with mid-epigastric pain x 2d. Mild nausea, no vomiting/diarrhea/constipation. No fever/chills. No clear precipitating factor. Mild tenderness "but it also hurts when I'm not touching it". Claims he had a BM yesterday. Has not really been eating due to pain. The patient had ventral hernia repair in July 2021.     Subjective:   5/2: Abdominal Pain improved. No nausea or vomiting. Passing a little flatus., Denies fever, chills, dizziness, chest pain, SOB  5/3: Abdominal pain similar to yesterday NGT failure/removal overnight. No nausea. Passing a little flatus. Still NPO and on IVF.   5/4: Abdominal pain unchanged; Off NGT; Repeat CT ->worsening SBO and ascites. Afebrile and hemodynamically stable.   5/5: more distended; more pain and more nausea. Planned for ex lap today.   5/6: s/p ex lap. Post op hypotension and rise in Cr. IVF boluses ordered to maintain MAP. Transferred to CICU for closer monitoring.   5/7: improved BP. Improved Cr. Improved Urine output. Pain controlled. No nausea. Intermittent confusion but easily re-oriented.   5/8: in restraints, not in acute distress   5/9: more alert  5/10 -seen in the morning,  alert,  knows his name, states he wants to sleep some more. denies abdo pain. ROS limited due to underlying dementia/delirium but he does not appear to be in acute distress   5/11 - seen this morning - shortness of breath, on NRM - gave lasix upgraded to CICU for bipap  5/12 - this morning tolerating bipap and looking more comfortable  5/13 - on bipap, titrating fio2, awake, alert, ROS limited but does not appear to be in distress, is NPO on PPN        Vital Signs Last 24 Hrs  T(F): 99.2 (13 May 2022 16:00), Max: 100.4 (13 May 2022 14:17)  HR: 84 (13 May 2022 18:00) (84 - 100)  BP: 121/82 (13 May 2022 18:00) (99/64 - 158/96)  BP(mean): 91 (13 May 2022 18:00) (69 - 111)  SpO2: 98% (13 May 2022 18:00) (81% - 98%)  Constitutional: alert on bipap, tolerating it well   HEENT:  EOMI, PERRLA; Dry mucous membranes  Neck: supple  Back: no tenderness  Respiratory: few crackles lung bases  Cardiovascular: S1S2 regular, no murmurs  Abdomen: appropriately tenders soft; ; hypoactive bowel sounds; Midline surgical incision site with soft surgical dressing; clean and dry  : Kc   Musculoskeletal: no muscle tenderness, no joint swelling or tenderness  Extremities: no pedal edema  Neurological: AxOx2, moving all extremities, no focal deficits  Skin:  no rash      RAD:  < from: US Duplex Venous Upper Ext Ltd, Right (05.08.22 @ 13:31) >  No evidence of right upper extremity deep venous thrombosis.  Extensive subcutaneous edema.    < from: CT Abdomen and Pelvis w/ Oral Cont (05.04.22 @ 14:53) >  Worsening small bowel obstruction with slight increase in dilatation.   Increasing ascites.  Developing small bilateral pleural effusions.    LABS: All Labs Reviewed:                        12.7   23.18 )-----------( 216      ( 13 May 2022 06:31 )             38.9     147<H>  |  114<H>  |  60<H>  ----------------------------<  136<H>  3.6   |  28  |  1.33<H>  Ca    10.0      13 May 2022 06:31  Phos  3.0     05-13  Mg     2.1     05-13      chlorhexidine 4% Liquid 1 Application(s) Topical <User Schedule>  dextrose 5%. 1000 milliLiter(s) IV Continuous <Continuous>  dextrose 5%. 1000 milliLiter(s) IV Continuous <Continuous>  fat emulsion (Fish Oil and Plant Based) 20% Infusion 0.92 Gm/kG/Day IV Continuous <Continuous>  heparin   Injectable 5000 Unit(s) SubCutaneous every 12 hours  meropenem  IVPB 1000 milliGRAM(s) IV Intermittent every 12 hours  methylPREDNISolone sodium succinate Injectable 40 milliGRAM(s) IV Push every 8 hours  metoprolol tartrate Injectable 5 milliGRAM(s) IV Push every 6 hours  ondansetron Injectable 4 milliGRAM(s) IV Push every 8 hours PRN  pantoprazole  Injectable 40 milliGRAM(s) IV Push daily  Parenteral Nutrition - Adult 1 Each TPN Continuous <Continuous>  Parenteral Nutrition - Adult 1 Each TPN Continuous <Continuous>  prochlorperazine   Injectable 10 milliGRAM(s) IntraMuscular every 8 hours PRN  silver sulfADIAZINE 1% Cream 1 Application(s) Topical daily                     CC: Patient is a 89y old  Male who presents with a chief complaint of abdominal pain   89M hx colon ca s/p resection ~15y ago here with mid-epigastric pain x 2d. Mild nausea, no vomiting/diarrhea/constipation. No fever/chills. No clear precipitating factor. Mild tenderness "but it also hurts when I'm not touching it". Claims he had a BM yesterday. Has not really been eating due to pain. The patient had ventral hernia repair in July 2021.     Subjective:   5/2: Abdominal Pain improved. No nausea or vomiting. Passing a little flatus., Denies fever, chills, dizziness, chest pain, SOB  5/3: Abdominal pain similar to yesterday NGT failure/removal overnight. No nausea. Passing a little flatus. Still NPO and on IVF.   5/4: Abdominal pain unchanged; Off NGT; Repeat CT ->worsening SBO and ascites. Afebrile and hemodynamically stable.   5/5: more distended; more pain and more nausea. Planned for ex lap today.   5/6: s/p ex lap. Post op hypotension and rise in Cr. IVF boluses ordered to maintain MAP. Transferred to CICU for closer monitoring.   5/7: improved BP. Improved Cr. Improved Urine output. Pain controlled. No nausea. Intermittent confusion but easily re-oriented.   5/8: in restraints, not in acute distress   5/9: more alert  5/10 -seen in the morning,  alert,  knows his name, states he wants to sleep some more. denies abdo pain. ROS limited due to underlying dementia/delirium but he does not appear to be in acute distress   5/11 - seen this morning - shortness of breath, on NRM - gave lasix upgraded to CICU for bipap  5/12 - this morning tolerating bipap and looking more comfortable  5/13 - on bipap, titrating fio2, awake, alert, ROS limited but does not appear to be in distress, is NPO on PPN ; worsening leukocytosis       Vital Signs Last 24 Hrs  T(F): 99.2 (13 May 2022 16:00), Max: 100.4 (13 May 2022 14:17)  HR: 84 (13 May 2022 18:00) (84 - 100)  BP: 121/82 (13 May 2022 18:00) (99/64 - 158/96)  BP(mean): 91 (13 May 2022 18:00) (69 - 111)  SpO2: 98% (13 May 2022 18:00) (81% - 98%)  Constitutional: alert on bipap, tolerating it well   HEENT:  EOMI, PERRLA; Dry mucous membranes  Neck: supple  Back: no tenderness  Respiratory: few crackles lung bases  Cardiovascular: S1S2 regular, no murmurs  Abdomen: appropriately tenders soft; ; hypoactive bowel sounds; Midline surgical incision site with soft surgical dressing; clean and dry  : Kc   Musculoskeletal: no muscle tenderness, no joint swelling or tenderness  Extremities: no pedal edema  Neurological: AxOx2, moving all extremities, no focal deficits  Skin:  no rash      RAD:  < from: US Duplex Venous Upper Ext Ltd, Right (05.08.22 @ 13:31) >  No evidence of right upper extremity deep venous thrombosis.  Extensive subcutaneous edema.    < from: CT Abdomen and Pelvis w/ Oral Cont (05.04.22 @ 14:53) >  Worsening small bowel obstruction with slight increase in dilatation.   Increasing ascites.  Developing small bilateral pleural effusions.    LABS: All Labs Reviewed:                        12.7   23.18 )-----------( 216      ( 13 May 2022 06:31 )             38.9     147<H>  |  114<H>  |  60<H>  ----------------------------<  136<H>  3.6   |  28  |  1.33<H>  Ca    10.0      13 May 2022 06:31  Phos  3.0     05-13  Mg     2.1     05-13      chlorhexidine 4% Liquid 1 Application(s) Topical <User Schedule>  dextrose 5%. 1000 milliLiter(s) IV Continuous <Continuous>  dextrose 5%. 1000 milliLiter(s) IV Continuous <Continuous>  fat emulsion (Fish Oil and Plant Based) 20% Infusion 0.92 Gm/kG/Day IV Continuous <Continuous>  heparin   Injectable 5000 Unit(s) SubCutaneous every 12 hours  meropenem  IVPB 1000 milliGRAM(s) IV Intermittent every 12 hours  methylPREDNISolone sodium succinate Injectable 40 milliGRAM(s) IV Push every 8 hours  metoprolol tartrate Injectable 5 milliGRAM(s) IV Push every 6 hours  ondansetron Injectable 4 milliGRAM(s) IV Push every 8 hours PRN  pantoprazole  Injectable 40 milliGRAM(s) IV Push daily  Parenteral Nutrition - Adult 1 Each TPN Continuous <Continuous>  Parenteral Nutrition - Adult 1 Each TPN Continuous <Continuous>  prochlorperazine   Injectable 10 milliGRAM(s) IntraMuscular every 8 hours PRN  silver sulfADIAZINE 1% Cream 1 Application(s) Topical daily

## 2022-05-13 NOTE — PROGRESS NOTE ADULT - SUBJECTIVE AND OBJECTIVE BOX
Date of service: 05-13-22 @ 08:38    Lying in bed in NAD  Alert  On BiPAP  No fever    ROS: no fever or chills; dose not seem in pain    MEDICATIONS  (STANDING):  chlorhexidine 4% Liquid 1 Application(s) Topical <User Schedule>  dextrose 5%. 1000 milliLiter(s) (50 mL/Hr) IV Continuous <Continuous>  fat emulsion (Fish Oil and Plant Based) 20% Infusion 0.92 Gm/kG/Day (20.83 mL/Hr) IV Continuous <Continuous>  heparin   Injectable 5000 Unit(s) SubCutaneous every 12 hours  meropenem  IVPB 1000 milliGRAM(s) IV Intermittent every 12 hours  metoprolol tartrate Injectable 5 milliGRAM(s) IV Push every 6 hours  Parenteral Nutrition - Adult 1 Each (83 mL/Hr) TPN Continuous <Continuous>  silver sulfADIAZINE 1% Cream 1 Application(s) Topical daily    Vital Signs Last 24 Hrs  T(C): 36.9 (13 May 2022 08:00), Max: 36.9 (13 May 2022 08:00)  T(F): 98.5 (13 May 2022 08:00), Max: 98.5 (13 May 2022 08:00)  HR: 100 (13 May 2022 08:00) (83 - 100)  BP: 141/75 (13 May 2022 08:00) (91/66 - 158/96)  BP(mean): 92 (13 May 2022 08:00) (67 - 114)  RR: --  SpO2: 95% (13 May 2022 08:00) (81% - 97%)     Physical exam:    Constitutional:  frail looking male  HEENT: NC/AT  Neck: supple; thyroid not palpable  Back: no tenderness  Respiratory: respiratory effort normal; crackles at bases  Cardiovascular: S1S2 regular, no murmurs  Abdomen: soft, not tender, not distended, positive BS; midline incision intact  Genitourinary: no suprapubic tenderness  Musculoskeletal: no muscle tenderness, no joint swelling or tenderness  Extremities: no pedal edema  Neurological/ Psychiatric: obtunded, moving all extremities  Skin: no rashes; no palpable lesions    Labs: reviewed                        11.7   15.74 )-----------( 133      ( 12 May 2022 06:51 )             35.5     05-12    147<H>  |  117<H>  |  56<H>  ----------------------------<  112<H>  3.8   |  21<L>  |  1.28    Ca    9.8      12 May 2022 06:51  Phos  4.3     05-12  Mg     2.1     05-12    TPro  5.4<L>  /  Alb  1.7<L>  /  TBili  0.4  /  DBili  x   /  AST  35  /  ALT  25  /  AlkPhos  104  05-11                        11.4   17.44 )-----------( 116      ( 11 May 2022 07:03 )             34.4     05-11    141  |  114<H>  |  49<H>  ----------------------------<  135<H>  4.1   |  18<L>  |  1.39<H>    Ca    9.6      11 May 2022 05:40  Phos  3.4     05-11  Mg     2.1     05-11    TPro  5.4<L>  /  Alb  1.7<L>  /  TBili  0.4  /  DBili  x   /  AST  35  /  ALT  25  /  AlkPhos  104  05-11    Cultures:     Culture - Blood (collected 05-07-22 @ 09:46)  Source: .Blood None  Preliminary Report (05-08-22 @ 17:01):    No growth to date.    Culture - Blood (collected 05-07-22 @ 09:40)  Source: .Blood None  Preliminary Report (05-08-22 @ 17:01):    No growth to date.    Radiology: all available radiological tests reviewed    < from: CT Abdomen and Pelvis w/ Oral Cont (05.04.22 @ 14:53) >  Worsening small bowel obstruction with slight increase in dilatation. Increasing ascites.  Developing small bilateral pleural effusions.  < end of copied text >    < from: CT Chest No Cont (05.11.22 @ 08:37) >  Interval development of bilateral patchy opacities in the lungs most   pronounced in the upper lobes raising concern for infection. Interval   developmentof atelectasis of the right lower lobe and increased partial   atelectasis left lower lobe. Moderate bilateral pleural effusions have increased.  < end of copied text >      Advanced directives addressed: full resuscitation

## 2022-05-13 NOTE — PROGRESS NOTE ADULT - SUBJECTIVE AND OBJECTIVE BOX
Patient is a 89y Male who transferred to CICU yest due to impend resp failure w fevers   + pna and now on Bipap      remains on Bipap for hypoxia        MEDICATIONS  (STANDING):  chlorhexidine 4% Liquid 1 Application(s) Topical <User Schedule>  dextrose 5%. 1000 milliLiter(s) (50 mL/Hr) IV Continuous <Continuous>  fat emulsion (Fish Oil and Plant Based) 20% Infusion 0.92 Gm/kG/Day (20.83 mL/Hr) IV Continuous <Continuous>  heparin   Injectable 5000 Unit(s) SubCutaneous every 12 hours  meropenem  IVPB 1000 milliGRAM(s) IV Intermittent every 12 hours  methylPREDNISolone sodium succinate Injectable 40 milliGRAM(s) IV Push every 8 hours  metoprolol tartrate Injectable 5 milliGRAM(s) IV Push every 6 hours  pantoprazole  Injectable 40 milliGRAM(s) IV Push daily  Parenteral Nutrition - Adult 1 Each (83 mL/Hr) TPN Continuous <Continuous>  Parenteral Nutrition - Adult 1 Each (83 mL/Hr) TPN Continuous <Continuous>  silver sulfADIAZINE 1% Cream 1 Application(s) Topical daily      Vital Signs Last 24 Hrs  T(C): 37.3 (13 May 2022 16:00), Max: 38 (13 May 2022 14:17)  T(F): 99.2 (13 May 2022 16:00), Max: 100.4 (13 May 2022 14:17)  HR: 93 (13 May 2022 21:00) (84 - 107)  BP: 113/67 (13 May 2022 21:00) (99/64 - 158/96)  BP(mean): 76 (13 May 2022 21:00) (69 - 111)  RR: --  SpO2: 97% (13 May 2022 21:00) (81% - 98%)      I&O's Detail    12 May 2022 07:01  -  13 May 2022 07:00  --------------------------------------------------------  IN:  Total IN: 0 mL    OUT:    Indwelling Catheter - Urethral (mL): 900 mL  Total OUT: 900 mL    Total NET: -900 mL      13 May 2022 07:01  -  13 May 2022 22:54  --------------------------------------------------------  IN:    dextrose 5%: 200 mL  Total IN: 200 mL    OUT:    Indwelling Catheter - Urethral (mL): 550 mL  Total OUT: 550 mL    Total NET: -350 mL    PHYSICAL EXAM:    Constitutional: frail, Bipap +   HEENT: temp wasting  Neck: No LAD, No JVD  Respiratory:dist  Cardiovascular: S1 and S2     147    |  114    |  60     ----------------------------<  136       13 May 2022 06:31  3.6     |  28     |  1.33     147    |  117    |  56     ----------------------------<  112       12 May 2022 06:51  3.8     |  21     |  1.28     141    |  114    |  49     ----------------------------<  135       11 May 2022 05:40  4.1     |  18     |  1.39     Ca    10.0       13 May 2022 06:31  Ca    9.8        12 May 2022 06:51    Phos  3.0       13 May 2022 06:31  Phos  4.3       12 May 2022 06:51    Mg     2.1       13 May 2022 06:31  Mg     2.1       12 May 2022 06:51    TPro  4.9    /  Alb  1.6    /  TBili  0.4    /        13 May 2022 06:31  DBili  x      /  AST  18     /  ALT  19     /  AlkPhos  90       TPro  5.4    /  Alb  1.7    /  TBili  0.4    /        11 May 2022 05:40  DBili  x      /  AST  35     /  ALT  25     /  AlkPhos  104      Extremities: chronic  Neurological: Alert         LABS:    Urine Studies:          RADIOLOGY & ADDITIONAL STUDIES:

## 2022-05-13 NOTE — PROGRESS NOTE ADULT - ASSESSMENT
89 y/o Male with h/o colon Ca s/p resection ~15y ago, SSS ss/p PM, HTN, BPH, b/l THR was admitted on 5/7 for worsening mid-epigastric pain x 2d. Mild nausea, no vomiting/diarrhea/constipation. No fever/chills at home. No clear precipitating factor. On 5/5 he underwent an exploratory laparotomy and small bowel resection. He was given cefotetan IV. Postoperative, he became hypotensive, but improved with IV fluids. Alert, but confused. He remained on cefotetan.    1. B/l pneumonia. Probable aspiration pneumonia. SBO s/p SBR. CRF stage 3. Prior colon Ca s/p resection. B/l pleural effusions. Encephalopathy. Allergy to PCN.   -leukocytosis is worse  -respiratory frail  -on BiPAP  -he remains at risk for aspiration  s/p cefotetan  -on meropenem 1 gm IV q12h # 3  -tolerating abx well so far; no side effects noted  -continue abx coverage   -monitor abdomen  -monitor temps  -f/u CBC  -supportive care  2. Other issues:   -care per medicine    d/w medical team

## 2022-05-13 NOTE — PROGRESS NOTE ADULT - ASSESSMENT
89 yo male with hx of colon ca resection ~ 15 years ago with YAMINI setting of SBO.    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

## 2022-05-13 NOTE — CHART NOTE - NSCHARTNOTEFT_GEN_A_CORE
Event note- Surgery      Called by RN that there was "drainage" on the wound dressing.    MEDICATIONS  (STANDING):  chlorhexidine 4% Liquid 1 Application(s) Topical <User Schedule>  dextrose 5%. 1000 milliLiter(s) (50 mL/Hr) IV Continuous <Continuous>  dextrose 5%. 1000 milliLiter(s) (50 mL/Hr) IV Continuous <Continuous>  fat emulsion (Fish Oil and Plant Based) 20% Infusion 0.92 Gm/kG/Day (20.83 mL/Hr) IV Continuous <Continuous>  heparin   Injectable 5000 Unit(s) SubCutaneous every 12 hours  meropenem  IVPB 1000 milliGRAM(s) IV Intermittent every 12 hours  methylPREDNISolone sodium succinate Injectable 40 milliGRAM(s) IV Push every 8 hours  metoprolol tartrate Injectable 5 milliGRAM(s) IV Push every 6 hours  Parenteral Nutrition - Adult 1 Each (83 mL/Hr) TPN Continuous <Continuous>  Parenteral Nutrition - Adult 1 Each (83 mL/Hr) TPN Continuous <Continuous>  silver sulfADIAZINE 1% Cream 1 Application(s) Topical daily    MEDICATIONS  (PRN):  ondansetron Injectable 4 milliGRAM(s) IV Push every 8 hours PRN Nausea and/or Vomiting  prochlorperazine   Injectable 10 milliGRAM(s) IntraMuscular every 8 hours PRN Nasuea/Vomiting      Vital Signs Last 24 Hrs  T(C): 37.3 (13 May 2022 16:00), Max: 38 (13 May 2022 14:17)  T(F): 99.2 (13 May 2022 16:00), Max: 100.4 (13 May 2022 14:17)  HR: 84 (13 May 2022 18:00) (84 - 100)  BP: 121/82 (13 May 2022 18:00) (99/64 - 158/96)  BP(mean): 91 (13 May 2022 18:00) (69 - 114)  RR: --  SpO2: 98% (13 May 2022 18:00) (81% - 98%)    FOCUSED PHYSICAL EXAM:    ABD- There is a Telfa on the wound which appears to be several days old. There is dried bloody drainage on it   Dressing removed- Wound has stable and is clean dry and intact- minimal erythema and ecchymosis but no noted drainage.     Remainder of abdomen is soft.       I&O's Detail    12 May 2022 07:01  -  13 May 2022 07:00  --------------------------------------------------------  IN:  Total IN: 0 mL    OUT:    Indwelling Catheter - Urethral (mL): 900 mL  Total OUT: 900 mL    Total NET: -900 mL      13 May 2022 07:01  -  13 May 2022 18:23  --------------------------------------------------------  IN:    dextrose 5%: 200 mL  Total IN: 200 mL    OUT:    Indwelling Catheter - Urethral (mL): 550 mL  Total OUT: 550 mL    Total NET: -350 mL          LABS:                        12.7   23.18 )-----------( 216      ( 13 May 2022 06:31 )             38.9     05-13    147<H>  |  114<H>  |  60<H>  ----------------------------<  136<H>  3.6   |  28  |  1.33<H>    Ca    10.0      13 May 2022 06:31  Phos  3.0     05-13  Mg     2.1     05-13    TPro  4.9<L>  /  Alb  1.6<L>  /  TBili  0.4  /  DBili  x   /  AST  18  /  ALT  19  /  AlkPhos  90  05-13      A/P POD 8 exp lap MEAGAN SBR  Wound is clean with no active drainage- clean dressing applied

## 2022-05-13 NOTE — PROGRESS NOTE ADULT - SUBJECTIVE AND OBJECTIVE BOX
Awake, alert. On bipap. Denies pain    VSS afeb    lungs- clear  Cor- RRR  Abd- + BS soft non tender  Ext- no edema

## 2022-05-13 NOTE — PROGRESS NOTE ADULT - ASSESSMENT
89M hx colon ca s/p resection in 2004 here with severe abdominal  pain. Symptoms started 2days ago and got worse.  Mild nausea, no vomiting/diarrhea/constipation. No fever/chills. No clear precipitating factor. Mild tenderness "but it also hurts when I'm not touching it". Claims he had a BM yesterday. Has not really been eating due to pain. The patient had ventral hernia repair in July 2021. CT abd shows high grade distal SBO.     PROBLEMS:    AC HYPOXAEMIC RESPIRATORY FAILURE  ASPIRATION PNEUMONIA  SBO  YAMINI on CKD III   Anemia likely hemodilutional + blood loss related to surgery. More likely hemodilutional.   Hypercalcmemia (multifactorial: dehydration + primary hyperparathyroidism)  HTN  BPH  SSS s/p PPM  toxic metabolic encephalopathy multifactorial   urinary retention and decreased urinary output  hypernatremia    Plan:    BIPAP 10/5-titrate fio2 60%  IV ABX MEROPENEM  Iv solu-medrols 40mg q8hr  PPN  SUPPORTIVE CARE  KEEP NEG BALANCE  d/w staff  DVT PROPHYLASIX

## 2022-05-14 NOTE — PROGRESS NOTE ADULT - SUBJECTIVE AND OBJECTIVE BOX
CC: Patient is a 89y old  Male who presents with a chief complaint of abdominal pain   89M hx colon ca s/p resection ~15y ago here with mid-epigastric pain x 2d. Mild nausea, no vomiting/diarrhea/constipation. No fever/chills. No clear precipitating factor. Mild tenderness "but it also hurts when I'm not touching it". Claims he had a BM yesterday. Has not really been eating due to pain. The patient had ventral hernia repair in July 2021.     Subjective:   5/2: Abdominal Pain improved. No nausea or vomiting. Passing a little flatus., Denies fever, chills, dizziness, chest pain, SOB  5/3: Abdominal pain similar to yesterday NGT failure/removal overnight. No nausea. Passing a little flatus. Still NPO and on IVF.   5/4: Abdominal pain unchanged; Off NGT; Repeat CT ->worsening SBO and ascites. Afebrile and hemodynamically stable.   5/5: more distended; more pain and more nausea. Planned for ex lap today.   5/6: s/p ex lap. Post op hypotension and rise in Cr. IVF boluses ordered to maintain MAP. Transferred to CICU for closer monitoring.   5/7: improved BP. Improved Cr. Improved Urine output. Pain controlled. No nausea. Intermittent confusion but easily re-oriented.   5/8: in restraints, not in acute distress   5/9: more alert  5/10 -seen in the morning,  alert,  knows his name, states he wants to sleep some more. denies abdo pain. ROS limited due to underlying dementia/delirium but he does not appear to be in acute distress   5/11 - seen this morning - shortness of breath, on NRM - gave lasix upgraded to CICU for bipap  5/12 - this morning tolerating bipap and looking more comfortable  5/13 - on bipap, titrating fio2, awake, alert, ROS limited but does not appear to be in distress, is NPO on PPN ; worsening leukocytosis   5/14 - seen in AM - on bipap, comfortable, appears dry, awake, alert      Vital Signs Last 24 Hrs  T(F): 97.4 (14 May 2022 15:46), Max: 99 (13 May 2022 22:00)  HR: 91 (14 May 2022 18:00) (74 - 107)  BP: 132/72 (14 May 2022 18:00) (111/81 - 149/98)  BP(mean): 84 (14 May 2022 18:00) (76 - 109)  SpO2: 99% (14 May 2022 18:00) (93% - 100%)  Constitutional: alert on bipap, tolerating it well   HEENT:  EOMI, PERRLA; Dry mucous membranes  Neck: supple  Back: no tenderness  Respiratory: few crackles lung bases - improved   Cardiovascular: S1S2 regular, no murmurs  Abdomen: appropriately tenders soft; ; hypoactive bowel sounds; Midline surgical incision site with soft surgical dressing; clean and dry  : Kc   Musculoskeletal: no muscle tenderness, no joint swelling or tenderness  Extremities: no pedal edema  Neurological: AxOx2, moving all extremities, no focal deficits  Skin:  no rash      RAD:  < from: US Duplex Venous Upper Ext Ltd, Right (05.08.22 @ 13:31) >  No evidence of right upper extremity deep venous thrombosis.  Extensive subcutaneous edema.    < from: CT Abdomen and Pelvis w/ Oral Cont (05.04.22 @ 14:53) >  Worsening small bowel obstruction with slight increase in dilatation.   Increasing ascites.  Developing small bilateral pleural effusions.    LABS: All Labs Reviewed:                     11.3   26.39 )-----------( 195      ( 14 May 2022 05:43 )             34.9   148<H>  |  118<H>  |  61<H>  ----------------------------<  155<H>  4.2   |  26  |  1.10  Ca    10.1      14 May 2022 05:43  Phos  2.8     05-14  Mg     2.1     05-14  TPro  4.7<L>  /  Alb  1.5<L>  /  TBili  0.2  /  DBili  x   /  AST  21  /  ALT  15  /  AlkPhos  97  05-14        MEDS:   chlorhexidine 4% Liquid 1 Application(s) Topical <User Schedule>  fat emulsion (Fish Oil and Plant Based) 20% Infusion 0.92 Gm/kG/Day IV Continuous <Continuous>  furosemide   Injectable 20 milliGRAM(s) IV Push daily  heparin   Injectable 5000 Unit(s) SubCutaneous every 12 hours  meropenem  IVPB 1000 milliGRAM(s) IV Intermittent every 12 hours  methylPREDNISolone sodium succinate Injectable 40 milliGRAM(s) IV Push every 12 hours  metoprolol tartrate Injectable 5 milliGRAM(s) IV Push every 6 hours  ondansetron Injectable 4 milliGRAM(s) IV Push every 8 hours PRN  pantoprazole  Injectable 40 milliGRAM(s) IV Push daily  Parenteral Nutrition - Adult 1 Each TPN Continuous <Continuous>  Parenteral Nutrition - Adult 1 Each TPN Continuous <Continuous>  prochlorperazine   Injectable 10 milliGRAM(s) IntraMuscular every 8 hours PRN  silver sulfADIAZINE 1% Cream 1 Application(s) Topical daily

## 2022-05-14 NOTE — PROGRESS NOTE ADULT - SUBJECTIVE AND OBJECTIVE BOX
Patient is a 89y Male who transferred to CICU t due to impend resp failure w fevers  remains on Bipap     wife at bedside     MEDICATIONS  (STANDING):  chlorhexidine 4% Liquid 1 Application(s) Topical <User Schedule>  fat emulsion (Fish Oil and Plant Based) 20% Infusion 0.92 Gm/kG/Day (20.83 mL/Hr) IV Continuous <Continuous>  furosemide   Injectable 20 milliGRAM(s) IV Push daily  heparin   Injectable 5000 Unit(s) SubCutaneous every 12 hours  meropenem  IVPB 1000 milliGRAM(s) IV Intermittent every 12 hours  methylPREDNISolone sodium succinate Injectable 40 milliGRAM(s) IV Push every 12 hours  metoprolol tartrate Injectable 5 milliGRAM(s) IV Push every 6 hours  pantoprazole  Injectable 40 milliGRAM(s) IV Push daily  Parenteral Nutrition - Adult 1 Each (92 mL/Hr) TPN Continuous <Continuous>  Parenteral Nutrition - Adult 1 Each (83 mL/Hr) TPN Continuous <Continuous>  silver sulfADIAZINE 1% Cream 1 Application(s) Topical daily    Vital Signs Last 24 Hrs  T(C): 36.3 (14 May 2022 15:46), Max: 37.2 (13 May 2022 22:00)  T(F): 97.4 (14 May 2022 15:46), Max: 99 (13 May 2022 22:00)  HR: 91 (14 May 2022 18:00) (74 - 107)  BP: 132/72 (14 May 2022 18:00) (111/81 - 149/98)  BP(mean): 84 (14 May 2022 18:00) (76 - 109)  RR: --  SpO2: 99% (14 May 2022 18:00) (93% - 100%)    I&O's Detail    13 May 2022 07:01  -  14 May 2022 07:00  --------------------------------------------------------  IN:    dextrose 5%: 200 mL    Fat Emulsion (Fish Oil &amp; Plant Based) 20% Infusion: 20.8 mL    PPN (Peripheral Parenteral Nutrition): 83 mL  Total IN: 303.8 mL    OUT:    Indwelling Catheter - Urethral (mL): 550 mL    Voided (mL): 650 mL  Total OUT: 1200 mL    Total NET: -896.2 mL      14 May 2022 07:01  -  14 May 2022 19:15  --------------------------------------------------------  IN:    dextrose 5%: 150 mL    Fat Emulsion (Fish Oil &amp; Plant Based) 20% Infusion: 20.8 mL    PPN (Peripheral Parenteral Nutrition): 913 mL  Total IN: 1083.8 mL    OUT:    Indwelling Catheter - Urethral (mL): 1250 mL  Total OUT: 1250 mL    Total NET: -166.2 mL      PHYSICAL EXAM:    Constitutional: frail, Bipap +   HEENT: temp wasting  Neck: No LAD, No JVD  Respiratory: dist  Cardiovascular: S1 and S2                             11.3   26.39 )-----------( 195      ( 14 May 2022 05:43 )             34.9                         12.7   23.18 )-----------( 216      ( 13 May 2022 06:31 )             38.9       148    |  118    |  61     ----------------------------<  155       14 May 2022 05:43  4.2     |  26     |  1.10     147    |  114    |  60     ----------------------------<  136       13 May 2022 06:31  3.6     |  28     |  1.33     147    |  117    |  56     ----------------------------<  112       12 May 2022 06:51  3.8     |  21     |  1.28     Ca    10.1       14 May 2022 05:43  Ca    10.0       13 May 2022 06:31    Phos  2.8       14 May 2022 05:43  Phos  3.0       13 May 2022 06:31    Mg     2.1       14 May 2022 05:43  Mg     2.1       13 May 2022 06:31    TPro  4.7    /  Alb  1.5    /  TBili  0.2    /        14 May 2022 05:43  DBili  x      /  AST  21     /  ALT  15     /  AlkPhos  97       TPro  4.9    /  Alb  1.6    /  TBili  0.4    /        13 May 2022 06:31  DBili  x      /  AST  18     /  ALT  19     /  AlkPhos  90               RADIOLOGY & ADDITIONAL STUDIES:    The lungs show partial clearing of the lungs with less pulmonary   congestion and reduction of pleural effusions. There is no evidence of   pneumothorax.    IMPRESSION:  Partial clearing of the lungs.

## 2022-05-14 NOTE — PROGRESS NOTE ADULT - ASSESSMENT
89 yo male with hx of colon ca resection ~ 15 years ago with YAMINI setting of SBO.    YAMINI postop  ATN w hypotension - resolved     Cr fluctuating w acute illness - monitor daily w diuresis     no ACE or ARB      no IV contrast    No NSAID's    Azotemia - expected sec to IV steroids - avoid BUN > 70 - taper off as per Pulm     Hypernatremia - rising    PPN adjusted today ) Na 117 in PPN noted    continue to monitor and lower Na/increase free water if IV lasix being given    limit Lasix use below       Hypoxia - aspiration +/- component of fluid overload? 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    YAMINI postop  ATN w hypotension - resolved     Cr fluctuating w acute illness - monitor daily w diuresis     no ACE or ARB      no IV contrast    No NSAID's    Azotemia - expected sec to IV steroids - avoid BUN > 70 - taper off as per Pulm     Hypernatremia - rising    PPN adjusted today ) Na 117 in PPN noted    continue to monitor and lower Na/increase free water if IV lasix being given    limit Lasix use below       Hypoxia - aspiration +/- component of fluid overload? 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

## 2022-05-14 NOTE — PROGRESS NOTE ADULT - SUBJECTIVE AND OBJECTIVE BOX
Resting comfortably on bipap, denies pain    VSS afeb    lungs- clear  Cor- RRR  Abd- + BS soft non tender, seropurulent fluid from lower staple line  Ext- no edema

## 2022-05-14 NOTE — PROGRESS NOTE ADULT - ASSESSMENT
Post op pneumonia, s/p SBR. Likely aspiration pneumonia. Cont IV abx as per ID. Pulmonary follow up. Diurese for pleural effusions. PPN.

## 2022-05-14 NOTE — CHART NOTE - NSCHARTNOTEFT_GEN_A_CORE
Clinical Nutrition PPN Follow Up Note    *89 yo male admitted with high grad distal SBO, YAMINI on CKD now s/p ex lap on 5/5.  hypercalcemia 2/2 dehydration and primary hyperparathyroidism. Pt started on PPN on 5/5 2/2 SBO and extended time NPO.  *5/9: As per surgery on 5/6: "s/p Expl lap, SBR for high grade SBO. Pt likely volume depleted with low urine output, elevated CR. Will bolus IVF to treat low UO."  Nephrology is following. Pt transferred to CICU for closer monitoring.   NGT pulled out on 5/5, pt refused replacement. Urine output improving. S/P SLP evaluation with recommendations regular consistency with thin liquids when medically feasible    **5/11: S/P RRT on 5/10 for hypoxia; encephalopathy 2/2 hypoxia; on venti-mask, pt now NPO.  CT chest shows consistent with b/l infiltrates.  worsening leukocytosis.  d/w Surgery PA, PN on hold for 5/10 and 5/11, lasix being given.    *of note, pt had urine output of 1300mL plus 1 undocumented urine episode with 4 episodes of diarrhea in the past 24hours.  (+1) Lt/Rt ankle, (+2) Lt/Rt arm edema documented.  ********pt was infused IVF at 80mL/hr on 5/8, 5/9, and 5/10 (~1900mL additional fluid volume).    *5/12: continues NPO on bipap until resp and mental status improve; s/p transferred to CICU. PPN on hold x 2 days but to re-start today as per Dr. Pete. I&O's not being appropriately/ accurately documented so unable to determine accurate fluid balance - will start total PN volume at 2000 mL and monitor/ adjust tomorrow based on labs/ I&O's. Strict and accurate I&O's are essential when pt is on PPN.   Now pending palliative care consult - highly rec'd to confirm goals of care regarding long-term nutrition support.    *current status: remains NPO and on bipap. Is high risk for aspiration. Currently w/ post-op pneumonia. Day #9 of PPN -Discussed with Surgical PA regarding PICC line placement stated will order consult today. Will continue with PPN until line placed then will start TPN to meet > 80% of ENN.     *labs reviewed; C/w Hypernatremia, will decrease Na in bag and increase volume 200ml (total volume 2200ml).  Potassium trending up will continue with current Potassium (65 mEq) however monitor and adjust prn. Mg WNL - Phos downtrending, will increase in bag 5 mmol.     Corrected Ca elevated. POCT WNL. Last Bilirubin total WNL for trace elements.   Last triglyceride level WNL for 50g lipids - pending new level.    05-14    148<H>  |  118<H>  |  61<H>  ----------------------------<  155<H>  4.2   |  26  |  1.10    Ca    10.1      14 May 2022 05:43  Phos  2.8     05-14  Mg     2.1     05-14    TPro  4.7<L>  /  Alb  1.5<L>  /  TBili  0.2  /  DBili  x   /  AST  21  /  ALT  15  /  AlkPhos  97  05-14  BMI: BMI (kg/m2): 17.7 (05-01-22 @ 08:41)  HbA1c:   Glucose: POCT Blood Glucose.: 167 mg/dL (05-11-22 @ 07:54)    BP: 130/71 (05-14-22 @ 07:00) (91/66 - 158/96)  Lipid Panel: Date/Time: 05-13-22 @ 06:31  Cholesterol, Serum: --  Direct LDL: --  HDL Cholesterol, Serum: --  Total Cholesterol/HDL Ration Measurement: --  Triglycerides, Serum: 271  **No POCT's noted. (As per protocol Q6 hr POCT's need to be ordered while on PN as pt is high risk for hyperglycemia)    *pertinent meds: Dextrose 5% IV - rec'd to d/c while pn PPN, meropenem, metoprolol, zofran, prochlorperazine     *I&O's Detail    13 May 2022 07:01  -  14 May 2022 07:00  --------------------------------------------------------  IN:    dextrose 5%: 200 mL  Total IN: 200 mL    OUT:    Indwelling Catheter - Urethral (mL): 550 mL    Voided (mL): 650 mL  Total OUT: 1200 mL    Total NET: -1000 mL        * fluid status: negative net volume. no PN or IVF documented.  will monitor and adjust PN prn.   *BM (+) 5/11; loose.  No new BM's doc'd. Pt not on bowel regimen.     *Mahesh Score of 13; stg 1 PI on sacrum documented. (+2) Lt/Rt arm edema documented.    *Malnutrition: Pt continues to meet criteria for severe malnutrition in acute on chronic illness r/t decreased ability to consume sufficient nutr 2/2 SBO on CA AEB meeting <50% of ENN x 7 days, severe muscle/ fat wasting    Estimated Needs: based on 55Kg (wt from 5/5, bedscale wt obtained by RD)  Calories: 7168-0260 Kcal (30-35 Kcal/Kg)  Protein:   g protein (1.5-2g/Kg)  Fluids:  3838-1169 mL (30-35mL/Kg)    Diet, NPO    Weight History: no significant changes x 2 days (admission weight may be inaccurate as there is a large discrepancy)   66kg (5/8)  66Kg (5/7)  Height (cm): 175.3 (05-01-22 @ 08:41)  Weight (kg): 54.4 (05-01-22 @ 08:41)  BMI (kg/m2): 17.7 (05-01-22 @ 08:41)  BSA (m2): 1.66 (05-01-22 @ 08:41)    PPN Recommendations: via peripheral venous line  Total Volume: 2200 mL  90 g  Amino Acids  100 g Dextrose  50 g Lipids 20%  117 mEq Sodium Chloride  0 mEq Sodium Acetate  10 mmol Sodium Phosphate  40 mEq Potassium Chloride  25 mEq Potassium Acetate  0 mmol Potassium Phosphate  0 mEq Calcium Gluconate    (Corrected Ca elevated - do NOT replete outside bag)  5 mEq Magnesium Sulfate  100 mg Thiamine  1 ml Trace   10 ml MVI    Total Calories:  1200  (Meets 73%  of  Estimated Energy needs  and   100%  Protein needs)     (osmolarity <900)      Recommendations:  1) Daily weights  2) Strict and accurate I & O's to closely monitor fluid balance  3) Daily lyte checks - including Mg and Phos  4) Weekly triglycerides/LFT checks  5) POCT q6 hours - maintain BG levels between 140- 180 mg/dL ***(Order POCT's as per protocol- pt at high risk for hyperglycemia when PPN infusing).  6) Confirm goals of care regarding long-term nutrition support   7) place central line to provide TPN and meet 100% ENN - It is now day #9 of PPN and meeting <80% ENN    *will monitor and adjust treatement plan prn*  Mona Monaco RD office: [phone]   Cell# [phone]

## 2022-05-14 NOTE — PROGRESS NOTE ADULT - SUBJECTIVE AND OBJECTIVE BOX
Subjective:    pat on bipap 60% sat 100%, awake, CXR improved with some pleural effusion.    Home Medications:  Advil Dual Action With Acetaminophen 250 mg-125 mg oral tablet: 2 tab(s) orally once a day, As Needed (01 May 2022 21:57)  alfuzosin 10 mg oral tablet, extended release: 1 tab(s) orally once a day (01 May 2022 21:57)  amLODIPine 5 mg oral tablet: 1 tab(s) orally once a day (01 May 2022 21:57)  azelastine nasal: nasal once a day (01 May 2022 21:57)  hydroCHLOROthiazide 25 mg oral tablet: 1 tab(s) orally once a day (01 May 2022 21:57)  losartan 25 mg oral tablet: 1/2  tab(s) orally once a day (01 May 2022 21:57)  metoprolol succinate 25 mg oral tablet, extended release: 1 tab(s) orally once a day (01 May 2022 21:57)    MEDICATIONS  (STANDING):  chlorhexidine 4% Liquid 1 Application(s) Topical <User Schedule>  dextrose 5%. 1000 milliLiter(s) (50 mL/Hr) IV Continuous <Continuous>  dextrose 5%. 1000 milliLiter(s) (50 mL/Hr) IV Continuous <Continuous>  dextrose 5%. 1000 milliLiter(s) (50 mL/Hr) IV Continuous <Continuous>  fat emulsion (Fish Oil and Plant Based) 20% Infusion 0.92 Gm/kG/Day (20.83 mL/Hr) IV Continuous <Continuous>  fat emulsion (Fish Oil and Plant Based) 20% Infusion 0.92 Gm/kG/Day (20.83 mL/Hr) IV Continuous <Continuous>  furosemide   Injectable 20 milliGRAM(s) IV Push daily  heparin   Injectable 5000 Unit(s) SubCutaneous every 12 hours  meropenem  IVPB 1000 milliGRAM(s) IV Intermittent every 12 hours  methylPREDNISolone sodium succinate Injectable 40 milliGRAM(s) IV Push every 8 hours  metoprolol tartrate Injectable 5 milliGRAM(s) IV Push every 6 hours  pantoprazole  Injectable 40 milliGRAM(s) IV Push daily  Parenteral Nutrition - Adult 1 Each (92 mL/Hr) TPN Continuous <Continuous>  Parenteral Nutrition - Adult 1 Each (83 mL/Hr) TPN Continuous <Continuous>  silver sulfADIAZINE 1% Cream 1 Application(s) Topical daily    MEDICATIONS  (PRN):  ondansetron Injectable 4 milliGRAM(s) IV Push every 8 hours PRN Nausea and/or Vomiting  prochlorperazine   Injectable 10 milliGRAM(s) IntraMuscular every 8 hours PRN Nasuea/Vomiting      Allergies    amoxicillin (Other (Severe))  penicillin (Unknown)    Intolerances        Vital Signs Last 24 Hrs  T(C): 36.4 (14 May 2022 08:57), Max: 38 (13 May 2022 14:17)  T(F): 97.6 (14 May 2022 08:57), Max: 100.4 (13 May 2022 14:17)  HR: 74 (14 May 2022 11:00) (74 - 107)  BP: 123/75 (14 May 2022 11:00) (99/74 - 143/82)  BP(mean): 88 (14 May 2022 11:00) (69 - 105)  RR: --  SpO2: 97% (14 May 2022 11:00) (92% - 100%)      PHYSICAL EXAMINATION:    NECK:  Supple. No lymphadenopathy. Jugular venous pressure not elevated. Carotids equal.   HEART:   The cardiac impulse has a normal quality. Reg., Nl S1 and S2.  There are no murmurs, rubs or gallops noted  CHEST:  Chest crackles to auscultation. Normal respiratory effort.  ABDOMEN:  Soft and nontender.   EXTREMITIES:  There is no edema.       LABS:                        11.3   26.39 )-----------( 195      ( 14 May 2022 05:43 )             34.9     05-14    148<H>  |  118<H>  |  61<H>  ----------------------------<  155<H>  4.2   |  26  |  1.10    Ca    10.1      14 May 2022 05:43  Phos  2.8     05-14  Mg     2.1     05-14    TPro  4.7<L>  /  Alb  1.5<L>  /  TBili  0.2  /  DBili  x   /  AST  21  /  ALT  15  /  AlkPhos  97  05-14

## 2022-05-14 NOTE — PROGRESS NOTE ADULT - ASSESSMENT
89M hx colon ca s/p resection in 2004 here with severe abdominal  pain. Symptoms started 2days ago and got worse.  Mild nausea, no vomiting/diarrhea/constipation. No fever/chills. No clear precipitating factor. Mild tenderness "but it also hurts when I'm not touching it". Claims he had a BM yesterday. Has not really been eating due to pain. The patient had ventral hernia repair in July 2021. CT abd shows high grade distal SBO.     PROBLEMS:    AC HYPOXAEMIC RESPIRATORY FAILURE  ASPIRATION PNEUMONIA  SBO  YAMINI on CKD III   Anemia likely hemodilutional + blood loss related to surgery. More likely hemodilutional.   Hypercalcmemia (multifactorial: dehydration + primary hyperparathyroidism)  HTN  BPH  SSS s/p PPM  toxic metabolic encephalopathy multifactorial   urinary retention and decreased urinary output  hypernatremia    Plan:    BIPAP 10/5-titrate fio2 40%  IV ABX MEROPENEM  taper Iv solu-medrols 40mg q12hr  PPN  Iv lasix PRN  SUPPORTIVE CARE  KEEP NEG BALANCE  d/w staff  DVT PROPHYLASIX

## 2022-05-14 NOTE — PROGRESS NOTE ADULT - ASSESSMENT
89M hx colon ca s/p resection in 2004 here with severe abdominal  pain. Symptoms started 2days ago and got worse.  Mild nausea, no vomiting/diarrhea/constipation. No fever/chills. No clear precipitating factor. Mild tenderness "but it also hurts when I'm not touching it". Claims he had a BM yesterday. Has not really been eating due to pain. The patient had ventral hernia repair in July 2021. CT abd shows high grade distal SBO.       A/P  SBO  YAMINI on CKD III due to nausea/vomiting and decreased PO intake POA and then Resolved then subsequent recurrence with post op YAMINI likely due to hypovolemia/hypotension improved  Now with Acute Hypoxic resp failure like to fluid overload, and acute diastolic CHF   Leukocytosis  post operative - risk of aspiration pneumonia, bacterial translocation in the gut   Anemia likely hemodilutional + blood loss related to surgery. More likely hemodilutional.   Hypercalcemia (multifactorial: dehydration + primary hyperparathyroidism)  HTN  BPH  SSS s/p PPM  toxic metabolic encephalopathy multifactorial   urinary retention and decreased urinary output  hypernatremia    Plan:  -admitted to surgery, originally on tele, then 1N, now being monitored on CICU  - Pt was seen on 1N , in acute hypoxic resp failure, stopped PPN, gave lasix, was on 100 NRM so placed in CICU for trial of BIPAP as tolerated on 5/11 -   - is tolerating BIPAP and will cont for now. on IV solu-medrol, titrating down the O2  - Hypernatremia, patient very dry, no pedal edema, cont BIPAP now on 40% FiO2 - place on low dose D5W 50cc/h   - does not need lasix now; dietary team to adjust PPN to correct the hypernatremia   - on empiric abx coverage post op due to risk of colonic bacterial translocation and possible aspiration PNA - leukocytosis worse today but cont meropenem   - CT Chest reviewed - biateral infiltrates/pulm edema; CXR in AM   - Normally on BB ARB amlodipine - now NPO and will place on IV BB alone   -  Flomax when able to take PO   - would hold PO diet for now until resp and mental status improve; cont PPN per surgery   - No Pall care veal at htis time, pt remains full code     Ty,  pls call with q

## 2022-05-15 NOTE — PROGRESS NOTE ADULT - SUBJECTIVE AND OBJECTIVE BOX
CC: Patient is a 89y old  Male who presents with a chief complaint of abdominal pain   89M hx colon ca s/p resection ~15y ago here with mid-epigastric pain x 2d. Mild nausea, no vomiting/diarrhea/constipation. No fever/chills. No clear precipitating factor. Mild tenderness "but it also hurts when I'm not touching it". Claims he had a BM yesterday. Has not really been eating due to pain. The patient had ventral hernia repair in July 2021.     Subjective:   5/2: Abdominal Pain improved. No nausea or vomiting. Passing a little flatus., Denies fever, chills, dizziness, chest pain, SOB  5/3: Abdominal pain similar to yesterday NGT failure/removal overnight. No nausea. Passing a little flatus. Still NPO and on IVF.   5/4: Abdominal pain unchanged; Off NGT; Repeat CT ->worsening SBO and ascites. Afebrile and hemodynamically stable.   5/5: more distended; more pain and more nausea. Planned for ex lap today.   5/6: s/p ex lap. Post op hypotension and rise in Cr. IVF boluses ordered to maintain MAP. Transferred to CICU for closer monitoring.   5/7: improved BP. Improved Cr. Improved Urine output. Pain controlled. No nausea. Intermittent confusion but easily re-oriented.   5/8: in restraints, not in acute distress   5/9: more alert  5/10 -seen in the morning,  alert,  knows his name, states he wants to sleep some more. denies abdo pain. ROS limited due to underlying dementia/delirium but he does not appear to be in acute distress   5/11 - seen this morning - shortness of breath, on NRM - gave lasix upgraded to CICU for bipap  5/12 - this morning tolerating bipap and looking more comfortable  5/13 - on bipap, titrating fio2, awake, alert, ROS limited but does not appear to be in distress, is NPO on PPN ; worsening leukocytosis   5/14 - seen in AM - on bipap, comfortable, appears dry, awake, alert  5/15 - looks better today, interactive, denies cp palps sob able to state his name       Vital Signs Last 24 Hrs  T(F): 97.5 (15 May 2022 11:56), Max: 99 (14 May 2022 22:15)  HR: 75 (15 May 2022 20:00) (75 - 97)  BP: 131/70 (15 May 2022 20:00) (110/58 - 137/74)  BP(mean): 85 (15 May 2022 20:00) (69 - 91)  SpO2: 96% (15 May 2022 20:00) (93% - 100%)  Constitutional: alert, currently off bipap  HEENT:  EOMI, PERRLA; Dry as a bone   Neck: supple  Back: no tenderness  Respiratory: few crackles lung bases - improved   Cardiovascular: S1S2 regular, no murmurs  Abdomen: appropriately tenders soft; ; hypoactive bowel sounds; Midline surgical incision site with soft surgical dressing; clean and dry  : Kc   Musculoskeletal: no muscle tenderness, no joint swelling or tenderness  Extremities: no pedal edema  Neurological: AxOx2, moving all extremities, no focal deficits  Skin:  no rash      RAD:  < from: US Duplex Venous Upper Ext Ltd, Right (05.08.22 @ 13:31) >  No evidence of right upper extremity deep venous thrombosis.  Extensive subcutaneous edema.    < from: CT Abdomen and Pelvis w/ Oral Cont (05.04.22 @ 14:53) >  Worsening small bowel obstruction with slight increase in dilatation.   Increasing ascites.  Developing small bilateral pleural effusions.    LABS: All Labs Reviewed:                     11.3   26.39 )-----------( 195      ( 14 May 2022 05:43 )             34.9  148<H>  |  116<H>  |  78<H>  ----------------------------<  163<H>  5.1   |  26  |  1.13  Ca    10.4<H>      15 May 2022 05:14  Phos  4.0     05-15  Mg     2.3     05-15  TPro  4.7<L>  /  Alb  1.5<L>  /  TBili  0.2  /  DBili  x   /  AST  21  /  ALT  15  /  AlkPhos  97  05-14        MEDS:   chlorhexidine 4% Liquid 1 Application(s) Topical <User Schedule>  fat emulsion (Fish Oil and Plant Based) 20% Infusion 0.92 Gm/kG/Day IV Continuous <Continuous>  heparin   Injectable 5000 Unit(s) SubCutaneous every 12 hours  meropenem  IVPB 1000 milliGRAM(s) IV Intermittent every 12 hours  methylPREDNISolone sodium succinate Injectable 40 milliGRAM(s) IV Push every 12 hours  metoprolol tartrate Injectable 5 milliGRAM(s) IV Push every 6 hours  ondansetron Injectable 4 milliGRAM(s) IV Push every 8 hours PRN  pantoprazole  Injectable 40 milliGRAM(s) IV Push daily  Parenteral Nutrition - Adult 1 Each TPN Continuous <Continuous>  Parenteral Nutrition - Adult 1 Each TPN Continuous <Continuous>  prochlorperazine   Injectable 10 milliGRAM(s) IntraMuscular every 8 hours PRN  silver sulfADIAZINE 1% Cream 1 Application(s) Topical daily                                         numerical 0-10

## 2022-05-15 NOTE — PROGRESS NOTE ADULT - ASSESSMENT
89M hx colon ca s/p resection in 2004 here with severe abdominal  pain. Symptoms started 2days ago and got worse.  Mild nausea, no vomiting/diarrhea/constipation. No fever/chills. No clear precipitating factor. Mild tenderness "but it also hurts when I'm not touching it". Claims he had a BM yesterday. Has not really been eating due to pain. The patient had ventral hernia repair in July 2021. CT abd shows high grade distal SBO.       A/P  SBO  YAMINI on CKD III due to nausea/vomiting and decreased PO intake POA and then Resolved then subsequent recurrence with post op YAMINI likely due to hypovolemia/hypotension improved  Now with Acute Hypoxic resp failure like to fluid overload, and acute diastolic CHF   Leukocytosis  post operative - risk of aspiration pneumonia, bacterial translocation in the gut   Anemia likely hemodilutional + blood loss related to surgery. More likely hemodilutional.   Hypercalcemia (multifactorial: dehydration + primary hyperparathyroidism)  HTN  BPH  SSS s/p PPM  toxic metabolic encephalopathy multifactorial   urinary retention and decreased urinary output  hypernatremia    Plan:  -admitted to surgery, originally on tele, then 1N, now being monitored on CICU  - Pt was seen on 1N , in acute hypoxic resp failure, stopped PPN, gave lasix, was on 100 NRM so placed in CICU for trial of BIPAP as tolerated on 5/11 -   - CT Chest reviewed - biateral infiltrates/pulm edema  - is tolerating BIPAP 40% FIO2 and trying to wean off today. on IV solu-medrol, titrating down the O2  - Hypernatremia, patient very dry, no pedal edema   -was on low dose D5W 50cc/h   - does not need IV lasix now, dced by me; dietary team to adjust PPN to correct the hypernatremia   - on empiric abx coverage post op due to risk of colonic bacterial translocation and possible aspiration PNA - leukocytosis worse today but cont meropenem   - Normally on BB ARB amlodipine - now NPO and will place on IV BB alone   -  Flomax when able to take PO   - would hold PO diet for now until resp and mental status improve; cont PPN per surgery   - No Pall care eval at this time, pt remains full code     Ty,  pls call with q

## 2022-05-15 NOTE — CHART NOTE - NSCHARTNOTEFT_GEN_A_CORE
Clinical Nutrition PPN Follow Up Note    *89 yo male admitted with high grad distal SBO, YAMINI on CKD now s/p ex lap on 5/5.  hypercalcemia 2/2 dehydration and primary hyperparathyroidism. Pt started on PPN on 5/5 2/2 SBO and extended time NPO.  *5/9: As per surgery on 5/6: "s/p Expl lap, SBR for high grade SBO. Pt likely volume depleted with low urine output, elevated CR. Will bolus IVF to treat low UO."  Nephrology is following. Pt transferred to CICU for closer monitoring.   NGT pulled out on 5/5, pt refused replacement. Urine output improving. S/P SLP evaluation with recommendations regular consistency with thin liquids when medically feasible    **5/11: S/P RRT on 5/10 for hypoxia; encephalopathy 2/2 hypoxia; on venti-mask, pt now NPO.  CT chest shows consistent with b/l infiltrates.  worsening leukocytosis.  d/w Surgery PA, PN on hold for 5/10 and 5/11, lasix being given.    *of note, pt had urine output of 1300mL plus 1 undocumented urine episode with 4 episodes of diarrhea in the past 24hours.  (+1) Lt/Rt ankle, (+2) Lt/Rt arm edema documented.  ********pt was infused IVF at 80mL/hr on 5/8, 5/9, and 5/10 (~1900mL additional fluid volume).    *5/12: continues NPO on bipap until resp and mental status improve; s/p transferred to CICU. PPN on hold x 2 days but to re-start today as per Dr. Peet. I&O's not being appropriately/ accurately documented so unable to determine accurate fluid balance - will start total PN volume at 2000 mL and monitor/ adjust tomorrow based on labs/ I&O's. Strict and accurate I&O's are essential when pt is on PPN.   Now pending palliative care consult - highly rec'd to confirm goals of care regarding long-term nutrition support.    *5/14: remains NPO and on bipap. Is high risk for aspiration. Currently w/ post-op pneumonia. Day #9 of PPN -Discussed with Surgical PA regarding PICC line placement stated will order consult today. Will continue with PPN until line placed then will start TPN to meet > 80% of ENN.     *current status: hypernatremia, being followed by nephrology, nephrology rec'd to limit lasix as pt with intravascular volume depletion and subcut edema due to third spacing.  continues on bipap and NPO.    *labs reviewed; 05-15; hypernatremia remains unchanged, will increase volume and decrease sodium; potassium and magnesium upper level norm, will remove magnesium and decrease potassium by 20mEq.  corrected Ca elevated.  bilirubin total WNL.  triglycerides within limits to c/w lipids.  no POCT ordered or checked, per protocol, while pt is on PPN POCT should be checked q6hrs due to high risk for hyper/hypoglycemia.    148<H>  |  116<H>  |  78<H>  ----------------------------<  163<H>  5.1   |  26  |  1.13    Ca    10.4<H>      15 May 2022 05:14  Phos  4.0     05-15  Mg     2.3     05-15    TPro  4.7<L>  /  Alb  1.5<L>  /  TBili  0.2  /  DBili  x   /  AST  21  /  ALT  15  /  AlkPhos  97  05-14  Lipid Panel: Date/Time: 05-13-22 @ 06:31  Triglycerides, Serum: 271    *pertinent meds: heparin, lasix, protonix, zofran    *I&O's Detail    14 May 2022 07:01  -  15 May 2022 07:00  --------------------------------------------------------  IN:    dextrose 5%: 150 mL    Fat Emulsion (Fish Oil &amp; Plant Based) 20% Infusion: 20.8 mL    PPN (Peripheral Parenteral Nutrition): 913 mL  Total IN: 1083.8 mL    OUT:    Indwelling Catheter - Urethral (mL): 2100 mL  Total OUT: 2100 mL    Total NET: -1016.2 mL    * fluid status: negative net volume. minimal PN or IVF documented.  will monitor and adjust PN prn.   *BM (+) 5/14; loose.  Pt not on bowel regimen.     *Mahesh Score of 13; stg 1 PI on sacrum documented. (+1) Rt arm, (+2) Lt arm edema documented.    *Malnutrition: Pt continues to meet criteria for severe malnutrition in acute on chronic illness r/t decreased ability to consume sufficient nutr 2/2 SBO on CA AEB meeting <50% of ENN x 7 days, severe muscle/ fat wasting    Estimated Needs: based on 55Kg (wt from 5/5, bedscale wt obtained by RD)  Calories: 3260-1181 Kcal (30-35 Kcal/Kg)  Protein:   g protein (1.5-2g/Kg)  Fluids:  7239-3075 mL (30-35mL/Kg)    Diet, NPO    Weight History: no significant changes x 2 days (admission weight may be inaccurate as there is a large discrepancy)   62.4Kg (5/15)  66kg (5/8)  66Kg (5/7)  Height (cm): 175.3 (05-01-22 @ 08:41)  Weight (kg): 54.4 (05-01-22 @ 08:41)  BMI (kg/m2): 17.7 (05-01-22 @ 08:41)  BSA (m2): 1.66 (05-01-22 @ 08:41)    PPN Recommendations: via peripheral venous line  Total Volume: 2300 mL    95 g  Amino Acids  125 g Dextrose  50 g Lipids 20%  102 mEq Sodium Chloride  0 mEq Sodium Acetate  10 mmol Sodium Phosphate  20 mEq Potassium Chloride  25 mEq Potassium Acetate  0 mmol Potassium Phosphate  0 mEq Calcium Gluconate    (Corrected Ca elevated - do NOT replete outside bag)  0 mEq Magnesium Sulfate  100 mg Thiamine  1 ml Trace   10 ml MVI    Total Calories:  1288  (Meets 78%  of  Estimated Energy needs  and   100%  Protein needs)     (osmolarity <900)      Recommendations:  1) Daily weights  2) Strict and accurate I & O's to closely monitor fluid balance  3) Daily lyte checks - including Mg and Phos  4) Weekly triglycerides/LFT checks  5) POCT q6 hours - maintain BG levels between 140- 180 mg/dL ***(Order POCT's as per protocol- pt at high risk for hyperglycemia when PPN infusing).  6) Confirm goals of care regarding long-term nutrition support   7) place central line to provide TPN and meet 100% ENN - It is now day #9 of PPN and meeting <80% ENN    *will monitor and adjust treatement plan prn*  Tamera Smith MA, RDN, CDN, [facility] [phone] Clinical Nutrition PPN Follow Up Note    *89 yo male admitted with high grad distal SBO, YAMINI on CKD now s/p ex lap on 5/5.  hypercalcemia 2/2 dehydration and primary hyperparathyroidism. Pt started on PPN on 5/5 2/2 SBO and extended time NPO.  *5/9: As per surgery on 5/6: "s/p Expl lap, SBR for high grade SBO. Pt likely volume depleted with low urine output, elevated CR. Will bolus IVF to treat low UO."  Nephrology is following. Pt transferred to CICU for closer monitoring.   NGT pulled out on 5/5, pt refused replacement. Urine output improving. S/P SLP evaluation with recommendations regular consistency with thin liquids when medically feasible    **5/11: S/P RRT on 5/10 for hypoxia; encephalopathy 2/2 hypoxia; on venti-mask, pt now NPO.  CT chest shows consistent with b/l infiltrates.  worsening leukocytosis.  d/w Surgery PA, PN on hold for 5/10 and 5/11, lasix being given.    *of note, pt had urine output of 1300mL plus 1 undocumented urine episode with 4 episodes of diarrhea in the past 24hours.  (+1) Lt/Rt ankle, (+2) Lt/Rt arm edema documented.  ********pt was infused IVF at 80mL/hr on 5/8, 5/9, and 5/10 (~1900mL additional fluid volume).    *5/12: continues NPO on bipap until resp and mental status improve; s/p transferred to CICU. PPN on hold x 2 days but to re-start today as per Dr. Pete. I&O's not being appropriately/ accurately documented so unable to determine accurate fluid balance - will start total PN volume at 2000 mL and monitor/ adjust tomorrow based on labs/ I&O's. Strict and accurate I&O's are essential when pt is on PPN.   Now pending palliative care consult - highly rec'd to confirm goals of care regarding long-term nutrition support.    *5/14: remains NPO and on bipap. Is high risk for aspiration. Currently w/ post-op pneumonia. Day #9 of PPN -Discussed with Surgical PA regarding PICC line placement stated will order consult today. Will continue with PPN until line placed then will start TPN to meet > 80% of ENN. pt started on PPN on 5/5/22 due to SBO and extended time NPO, remains on PPN due to bipap.  Today (5/15/22), is day # 10 on PPN, rec'd PICC line placement for complete nutr needs to be met.    *current status: hypernatremia, being followed by nephrology, nephrology rec'd to limit lasix as pt with intravascular volume depletion and subcut edema due to third spacing.  continues on bipap and NPO.    *labs reviewed; 05-15; hypernatremia remains unchanged, will increase volume and decrease sodium; potassium and magnesium upper level norm, will remove magnesium and decrease potassium by 20mEq.  corrected Ca elevated.  bilirubin total WNL.  triglycerides within limits to c/w lipids.  no POCT ordered or checked, per protocol, while pt is on PPN POCT should be checked q6hrs due to high risk for hyper/hypoglycemia.    148<H>  |  116<H>  |  78<H>  ----------------------------<  163<H>  5.1   |  26  |  1.13    Ca    10.4<H>      15 May 2022 05:14  Phos  4.0     05-15  Mg     2.3     05-15    TPro  4.7<L>  /  Alb  1.5<L>  /  TBili  0.2  /  DBili  x   /  AST  21  /  ALT  15  /  AlkPhos  97  05-14  Lipid Panel: Date/Time: 05-13-22 @ 06:31  Triglycerides, Serum: 271    *pertinent meds: heparin, lasix, protonix, zofran    *I&O's Detail    14 May 2022 07:01  -  15 May 2022 07:00  --------------------------------------------------------  IN:    dextrose 5%: 150 mL    Fat Emulsion (Fish Oil &amp; Plant Based) 20% Infusion: 20.8 mL    PPN (Peripheral Parenteral Nutrition): 913 mL  Total IN: 1083.8 mL    OUT:    Indwelling Catheter - Urethral (mL): 2100 mL  Total OUT: 2100 mL    Total NET: -1016.2 mL    * fluid status: negative net volume. minimal PN or IVF documented.  will monitor and adjust PN prn.   *BM (+) 5/14; loose.  Pt not on bowel regimen.     *Mahesh Score of 13; stg 1 PI on sacrum documented. (+1) Rt arm, (+2) Lt arm edema documented.    *Malnutrition: Pt continues to meet criteria for severe malnutrition in acute on chronic illness r/t decreased ability to consume sufficient nutr 2/2 SBO on CA AEB meeting <50% of ENN x 7 days, severe muscle/ fat wasting    Estimated Needs: based on 55Kg (wt from 5/5, bedscale wt obtained by RD)  Calories: 0605-1501 Kcal (30-35 Kcal/Kg)  Protein:   g protein (1.5-2g/Kg)  Fluids:  9872-1137 mL (30-35mL/Kg)    Diet, NPO    Weight History: no significant changes x 2 days (admission weight may be inaccurate as there is a large discrepancy)   62.4Kg (5/15)  66kg (5/8)  66Kg (5/7)  Height (cm): 175.3 (05-01-22 @ 08:41)  Weight (kg): 54.4 (05-01-22 @ 08:41)  BMI (kg/m2): 17.7 (05-01-22 @ 08:41)  BSA (m2): 1.66 (05-01-22 @ 08:41)    PPN Recommendations: via peripheral venous line  Total Volume: 2300 mL    95 g  Amino Acids  125 g Dextrose  50 g Lipids 20%  102 mEq Sodium Chloride  0 mEq Sodium Acetate  10 mmol Sodium Phosphate  20 mEq Potassium Chloride  25 mEq Potassium Acetate  0 mmol Potassium Phosphate  0 mEq Calcium Gluconate    (Corrected Ca elevated - do NOT replete outside bag)  0 mEq Magnesium Sulfate  100 mg Thiamine  1 ml Trace   10 ml MVI    Total Calories:  1288  (Meets 78%  of  Estimated Energy needs  and   100%  Protein needs)     (osmolarity <900)      Recommendations:  1) Daily weights  2) Strict and accurate I & O's to closely monitor fluid balance  3) Daily lyte checks - including Mg and Phos  4) Weekly triglycerides/LFT checks  5) POCT q6 hours - maintain BG levels between 140- 180 mg/dL ***(Order POCT's as per protocol- pt at high risk for hyperglycemia when PPN infusing).  6) Confirm goals of care regarding long-term nutrition support   7) place central line to provide TPN and meet 100% ENN - It is now day #9 of PPN and meeting <80% ENN    *will monitor and adjust treatement plan prn*  Tamera Smith MA, RDN, CDN, [facility] [phone]

## 2022-05-15 NOTE — PROGRESS NOTE ADULT - ASSESSMENT
89M hx colon ca s/p resection in 2004 here with severe abdominal  pain. Symptoms started 2days ago and got worse.  Mild nausea, no vomiting/diarrhea/constipation. No fever/chills. No clear precipitating factor. Mild tenderness "but it also hurts when I'm not touching it". Claims he had a BM yesterday. Has not really been eating due to pain. The patient had ventral hernia repair in July 2021. CT abd shows high grade distal SBO.     PROBLEMS:    AC HYPOXAEMIC RESPIRATORY FAILURE  ASPIRATION PNEUMONIA  SBO  YAMINI on CKD III   Anemia likely hemodilutional + blood loss related to surgery. More likely hemodilutional.   Hypercalcmemia (multifactorial: dehydration + primary hyperparathyroidism)  HTN  BPH  SSS s/p PPM  toxic metabolic encephalopathy multifactorial   urinary retention and decreased urinary output  hypernatremia    Plan:    VM 50%-titrate  BIPAP 10/5-titrate fio2 40%-qhs  IV ABX MEROPENEM  Iv solu-medrols 40mg q12hr  PPN  Iv lasix PRN  SUPPORTIVE CARE  KEEP NEG BALANCE  d/w staff  DVT PROPHYLASIX

## 2022-05-15 NOTE — PROGRESS NOTE ADULT - SUBJECTIVE AND OBJECTIVE BOX
Resting comfortably, denies pain    VSS afeb  lungs- clear  Cor- RRR  Abd- + BS soft non tender  Ext- no edema

## 2022-05-15 NOTE — PROGRESS NOTE ADULT - SUBJECTIVE AND OBJECTIVE BOX
Subjective:    pat better, awake, on 50% Vm sat 98%, no respiratory distress.    Home Medications:  Advil Dual Action With Acetaminophen 250 mg-125 mg oral tablet: 2 tab(s) orally once a day, As Needed (01 May 2022 21:57)  alfuzosin 10 mg oral tablet, extended release: 1 tab(s) orally once a day (01 May 2022 21:57)  amLODIPine 5 mg oral tablet: 1 tab(s) orally once a day (01 May 2022 21:57)  azelastine nasal: nasal once a day (01 May 2022 21:57)  hydroCHLOROthiazide 25 mg oral tablet: 1 tab(s) orally once a day (01 May 2022 21:57)  losartan 25 mg oral tablet: 1/2  tab(s) orally once a day (01 May 2022 21:57)  metoprolol succinate 25 mg oral tablet, extended release: 1 tab(s) orally once a day (01 May 2022 21:57)    MEDICATIONS  (STANDING):  chlorhexidine 4% Liquid 1 Application(s) Topical <User Schedule>  fat emulsion (Fish Oil and Plant Based) 20% Infusion 0.92 Gm/kG/Day (20.83 mL/Hr) IV Continuous <Continuous>  fat emulsion (Fish Oil and Plant Based) 20% Infusion 0.92 Gm/kG/Day (20.83 mL/Hr) IV Continuous <Continuous>  furosemide   Injectable 20 milliGRAM(s) IV Push daily  heparin   Injectable 5000 Unit(s) SubCutaneous every 12 hours  meropenem  IVPB 1000 milliGRAM(s) IV Intermittent every 12 hours  methylPREDNISolone sodium succinate Injectable 40 milliGRAM(s) IV Push every 12 hours  metoprolol tartrate Injectable 5 milliGRAM(s) IV Push every 6 hours  pantoprazole  Injectable 40 milliGRAM(s) IV Push daily  Parenteral Nutrition - Adult 1 Each (92 mL/Hr) TPN Continuous <Continuous>  Parenteral Nutrition - Adult 1 Each (96 mL/Hr) TPN Continuous <Continuous>  silver sulfADIAZINE 1% Cream 1 Application(s) Topical daily    MEDICATIONS  (PRN):  ondansetron Injectable 4 milliGRAM(s) IV Push every 8 hours PRN Nausea and/or Vomiting  prochlorperazine   Injectable 10 milliGRAM(s) IntraMuscular every 8 hours PRN Nasuea/Vomiting      Allergies    amoxicillin (Other (Severe))  penicillin (Unknown)    Intolerances        Vital Signs Last 24 Hrs  T(C): 36.3 (15 May 2022 08:24), Max: 37.2 (14 May 2022 22:15)  T(F): 97.3 (15 May 2022 08:24), Max: 99 (14 May 2022 22:15)  HR: 93 (15 May 2022 08:00) (80 - 93)  BP: 123/64 (15 May 2022 08:00) (110/58 - 149/98)  BP(mean): 78 (15 May 2022 08:00) (69 - 109)  RR: --  SpO2: 100% (15 May 2022 08:00) (93% - 100%)      PHYSICAL EXAMINATION:    NECK:  Supple. No lymphadenopathy. Jugular venous pressure not elevated. Carotids equal.   HEART:   The cardiac impulse has a normal quality. Reg., Nl S1 and S2.  There are no murmurs, rubs or gallops noted  CHEST:  Chest crackles to auscultation. Normal respiratory effort.  ABDOMEN:  Soft and nontender.   EXTREMITIES:  There is no edema.       LABS:                        11.3   26.39 )-----------( 195      ( 14 May 2022 05:43 )             34.9     05-15    148<H>  |  116<H>  |  78<H>  ----------------------------<  163<H>  5.1   |  26  |  1.13    Ca    10.4<H>      15 May 2022 05:14  Phos  4.0     05-15  Mg     2.3     05-15    TPro  4.7<L>  /  Alb  1.5<L>  /  TBili  0.2  /  DBili  x   /  AST  21  /  ALT  15  /  AlkPhos  97  05-14

## 2022-05-16 NOTE — PROGRESS NOTE ADULT - ASSESSMENT
89M hx colon ca s/p resection in 2004 here with severe abdominal  pain. Symptoms started 2days ago and got worse.  Mild nausea, no vomiting/diarrhea/constipation. No fever/chills. No clear precipitating factor. Mild tenderness "but it also hurts when I'm not touching it". Claims he had a BM yesterday. Has not really been eating due to pain. The patient had ventral hernia repair in July 2021. CT abd shows high grade distal SBO.       A/P  SBO  YAMINI on CKD III due to nausea/vomiting and decreased PO intake POA and then Resolved then subsequent recurrence with post op YAMINI likely due to hypovolemia/hypotension improved  Acute Hypoxic resp failure likely to fluid overload and acute diastolic CHF exacerbation - resolved  Leukocytosis  post operative - risk of aspiration pneumonia, bacterial translocation in the gut   Anemia likely hemodilutional + blood loss related to surgery. More likely hemodilutional.   Hypercalcemia (multifactorial: dehydration + primary hyperparathyroidism)  HTN  BPH  SSS s/p PPM  toxic metabolic encephalopathy multifactorial   urinary retention and decreased urinary output  hypernatremia    Plan:  -admitted to surgery, originally on tele, then 1N, now being monitored on CICU  - Resp failure - resolved, steroids taper and BIPAP Q HS  - Hypernatremia - add IVF as PPN is not sufficient with worsening Na, BUN  - on empiric abx coverage post op due to risk of colonic bacterial translocation and possible aspiration PNA  - Normally on BB ARB amlodipine - now NPO - IV BB alone   - Flomax when able to take PO   - No Pall care eval at this time, pt remains full code     Discussed with Dr. Gaytan  All available medical records personally reviewed

## 2022-05-16 NOTE — PROGRESS NOTE ADULT - SUBJECTIVE AND OBJECTIVE BOX
Date of service: 05-16-22 @ 11:28    Sitting in bed in NAD  Off BiPAP  SOB is improved; has dry cough  NPO  On TPN    ROS: no fever or chills; no HA, no SOB or cough, no abdominal pain, no diarrhea or constipation; no dysuria, no legs pain, no rashes    MEDICATIONS  (STANDING):  chlorhexidine 4% Liquid 1 Application(s) Topical <User Schedule>  fat emulsion (Fish Oil and Plant Based) 20% Infusion 0.92 Gm/kG/Day (20.83 mL/Hr) IV Continuous <Continuous>  fat emulsion (Fish Oil and Plant Based) 20% Infusion 0.92 Gm/kG/Day (20.83 mL/Hr) IV Continuous <Continuous>  heparin   Injectable 5000 Unit(s) SubCutaneous every 12 hours  meropenem  IVPB 1000 milliGRAM(s) IV Intermittent every 12 hours  methylPREDNISolone sodium succinate Injectable 40 milliGRAM(s) IV Push every 12 hours  metoprolol tartrate Injectable 5 milliGRAM(s) IV Push every 6 hours  pantoprazole  Injectable 40 milliGRAM(s) IV Push daily  Parenteral Nutrition - Adult 1 Each (96 mL/Hr) TPN Continuous <Continuous>  Parenteral Nutrition - Adult 1 Each (100 mL/Hr) TPN Continuous <Continuous>  silver sulfADIAZINE 1% Cream 1 Application(s) Topical daily  sodium chloride 0.9%. 1000 milliLiter(s) (80 mL/Hr) IV Continuous <Continuous>    Vital Signs Last 24 Hrs  T(C): 36.4 (16 May 2022 05:00), Max: 36.4 (15 May 2022 11:56)  T(F): 97.6 (16 May 2022 05:00), Max: 97.6 (16 May 2022 05:00)  HR: 82 (16 May 2022 07:00) (72 - 97)  BP: 135/73 (16 May 2022 07:00) (114/68 - 151/79)  BP(mean): 89 (16 May 2022 07:00) (78 - 96)  RR: --  SpO2: 97% (16 May 2022 07:00) (90% - 99%)     Physical exam:    Constitutional:  frail looking male  HEENT: NC/AT  Neck: supple; thyroid not palpable  Back: no tenderness  Respiratory: respiratory effort normal; crackles at bases  Cardiovascular: S1S2 regular, no murmurs  Abdomen: soft, not tender, not distended, positive BS; midline incision intact  Genitourinary: no suprapubic tenderness  Musculoskeletal: no muscle tenderness, no joint swelling or tenderness  Extremities: no pedal edema  Neurological/ Psychiatric: obtunded, moving all extremities  Skin: no rashes; no palpable lesions    Labs: reviewed                        10.7   24.56 )-----------( 189      ( 16 May 2022 05:32 )             32.9     05-16    149<H>  |  117<H>  |  87<H>  ----------------------------<  156<H>  4.3   |  29  |  1.12    Ca    10.2<H>      16 May 2022 05:32  Phos  3.0     05-16  Mg     2.1     05-16    TPro  4.6<L>  /  Alb  1.5<L>  /  TBili  0.3  /  DBili  x   /  AST  17  /  ALT  19  /  AlkPhos  80  05-16                        11.7   15.74 )-----------( 133      ( 12 May 2022 06:51 )             35.5     05-12    147<H>  |  117<H>  |  56<H>  ----------------------------<  112<H>  3.8   |  21<L>  |  1.28    Ca    9.8      12 May 2022 06:51  Phos  4.3     05-12  Mg     2.1     05-12    TPro  5.4<L>  /  Alb  1.7<L>  /  TBili  0.4  /  DBili  x   /  AST  35  /  ALT  25  /  AlkPhos  104  05-11                        11.4   17.44 )-----------( 116      ( 11 May 2022 07:03 )             34.4     05-11    141  |  114<H>  |  49<H>  ----------------------------<  135<H>  4.1   |  18<L>  |  1.39<H>    Ca    9.6      11 May 2022 05:40  Phos  3.4     05-11  Mg     2.1     05-11    TPro  5.4<L>  /  Alb  1.7<L>  /  TBili  0.4  /  DBili  x   /  AST  35  /  ALT  25  /  AlkPhos  104  05-11    Cultures:       Culture - Blood (collected 07 May 2022 09:46)  Source: .Blood None  Final Report (12 May 2022 17:00):    No Growth Final    Culture - Blood (collected 07 May 2022 09:40)  Source: .Blood None  Final Report (12 May 2022 17:00):    No Growth Final    Radiology: all available radiological tests reviewed    < from: CT Abdomen and Pelvis w/ Oral Cont (05.04.22 @ 14:53) >  Worsening small bowel obstruction with slight increase in dilatation. Increasing ascites.  Developing small bilateral pleural effusions.  < end of copied text >    < from: CT Chest No Cont (05.11.22 @ 08:37) >  Interval development of bilateral patchy opacities in the lungs most   pronounced in the upper lobes raising concern for infection. Interval   developmentof atelectasis of the right lower lobe and increased partial   atelectasis left lower lobe. Moderate bilateral pleural effusions have increased.  < end of copied text >      Advanced directives addressed: full resuscitation

## 2022-05-16 NOTE — PROGRESS NOTE ADULT - ASSESSMENT
89M hx colon ca s/p resection in 2004 here with severe abdominal  pain. Symptoms started 2days ago and got worse.  Mild nausea, no vomiting/diarrhea/constipation. No fever/chills. No clear precipitating factor. Mild tenderness "but it also hurts when I'm not touching it". Claims he had a BM yesterday. Has not really been eating due to pain. The patient had ventral hernia repair in July 2021. CT abd shows high grade distal SBO.     PROBLEMS:    AC HYPOXAEMIC RESPIRATORY FAILURE  ASPIRATION PNEUMONIA  SBO  YAMINI on CKD III   Anemia likely hemodilutional + blood loss related to surgery. More likely hemodilutional.   Hypercalcmemia (multifactorial: dehydration + primary hyperparathyroidism)  HTN  BPH  SSS s/p PPM  toxic metabolic encephalopathy multifactorial   urinary retention and decreased urinary output  hypernatremia    Plan:    4lpm nc sat 97%  BIPAP 10/5-titrate fio2 40%-qhs  IV MEROPENEM  taper iv solu-medrols 20mg q12hr  PPN  iv hydration  SUPPORTIVE CARE  KEEP NEG BALANCE  d/w staff  DVT PROPHYLASIX

## 2022-05-16 NOTE — PROGRESS NOTE ADULT - SUBJECTIVE AND OBJECTIVE BOX
Resting comfortably, no distress  VSS afeb  lungs- clear, diminished BS bases  Cor- RRR  Abd- + BS soft non tender, staples out  Ext- no edema

## 2022-05-16 NOTE — PROGRESS NOTE ADULT - SUBJECTIVE AND OBJECTIVE BOX
Subjective:    pat better, on 4lpm nc sat 97%, no respiratory distress, dehydrated iv fluids on PPN.    Home Medications:  Advil Dual Action With Acetaminophen 250 mg-125 mg oral tablet: 2 tab(s) orally once a day, As Needed (01 May 2022 21:57)  alfuzosin 10 mg oral tablet, extended release: 1 tab(s) orally once a day (01 May 2022 21:57)  amLODIPine 5 mg oral tablet: 1 tab(s) orally once a day (01 May 2022 21:57)  azelastine nasal: nasal once a day (01 May 2022 21:57)  hydroCHLOROthiazide 25 mg oral tablet: 1 tab(s) orally once a day (01 May 2022 21:57)  losartan 25 mg oral tablet: 1/2  tab(s) orally once a day (01 May 2022 21:57)  metoprolol succinate 25 mg oral tablet, extended release: 1 tab(s) orally once a day (01 May 2022 21:57)    MEDICATIONS  (STANDING):  chlorhexidine 4% Liquid 1 Application(s) Topical <User Schedule>  fat emulsion (Fish Oil and Plant Based) 20% Infusion 0.92 Gm/kG/Day (20.83 mL/Hr) IV Continuous <Continuous>  heparin   Injectable 5000 Unit(s) SubCutaneous every 12 hours  meropenem  IVPB 1000 milliGRAM(s) IV Intermittent every 12 hours  methylPREDNISolone sodium succinate Injectable 40 milliGRAM(s) IV Push every 12 hours  metoprolol tartrate Injectable 5 milliGRAM(s) IV Push every 6 hours  pantoprazole  Injectable 40 milliGRAM(s) IV Push daily  Parenteral Nutrition - Adult 1 Each (96 mL/Hr) TPN Continuous <Continuous>  Parenteral Nutrition - Adult 1 Each (100 mL/Hr) TPN Continuous <Continuous>  silver sulfADIAZINE 1% Cream 1 Application(s) Topical daily  sodium chloride 0.9%. 1000 milliLiter(s) (80 mL/Hr) IV Continuous <Continuous>    MEDICATIONS  (PRN):  ondansetron Injectable 4 milliGRAM(s) IV Push every 8 hours PRN Nausea and/or Vomiting  prochlorperazine   Injectable 10 milliGRAM(s) IntraMuscular every 8 hours PRN Nasuea/Vomiting      Allergies    amoxicillin (Other (Severe))  penicillin (Unknown)    Intolerances        Vital Signs Last 24 Hrs  T(C): 36.4 (16 May 2022 10:00), Max: 36.4 (16 May 2022 05:00)  T(F): 97.5 (16 May 2022 10:00), Max: 97.6 (16 May 2022 05:00)  HR: 88 (16 May 2022 17:00) (72 - 93)  BP: 120/69 (16 May 2022 17:00) (115/69 - 151/79)  BP(mean): 81 (16 May 2022 17:00) (78 - 101)  RR: --  SpO2: 100% (16 May 2022 17:00) (90% - 100%)      PHYSICAL EXAMINATION:    NECK:  Supple. No lymphadenopathy. Jugular venous pressure not elevated. Carotids equal.   HEART:   The cardiac impulse has a normal quality. Reg., Nl S1 and S2.  There are no murmurs, rubs or gallops noted  CHEST:  Chest crackles to auscultation. Normal respiratory effort.  ABDOMEN:  Soft and nontender.   EXTREMITIES:  There is no edema.       LABS:                        10.7   24.56 )-----------( 189      ( 16 May 2022 05:32 )             32.9     05-16    149<H>  |  117<H>  |  87<H>  ----------------------------<  156<H>  4.3   |  29  |  1.12    Ca    10.2<H>      16 May 2022 05:32  Phos  3.0     05-16  Mg     2.1     05-16    TPro  4.6<L>  /  Alb  1.5<L>  /  TBili  0.3  /  DBili  x   /  AST  17  /  ALT  19  /  AlkPhos  80  05-16

## 2022-05-16 NOTE — PROGRESS NOTE ADULT - SUBJECTIVE AND OBJECTIVE BOX
CC: Patient is a 89y old  Male who presents with a chief complaint of abdominal pain   89M hx colon ca s/p resection ~15y ago here with mid-epigastric pain x 2d. Mild nausea, no vomiting/diarrhea/constipation. No fever/chills. No clear precipitating factor. Mild tenderness "but it also hurts when I'm not touching it". Claims he had a BM yesterday. Has not really been eating due to pain. The patient had ventral hernia repair in 2021.     Subjective:   : Abdominal Pain improved. No nausea or vomiting. Passing a little flatus., Denies fever, chills, dizziness, chest pain, SOB  5/3: Abdominal pain similar to yesterday NGT failure/removal overnight. No nausea. Passing a little flatus. Still NPO and on IVF.   : Abdominal pain unchanged; Off NGT; Repeat CT ->worsening SBO and ascites. Afebrile and hemodynamically stable.   : more distended; more pain and more nausea. Planned for ex lap today.   : s/p ex lap. Post op hypotension and rise in Cr. IVF boluses ordered to maintain MAP. Transferred to CICU for closer monitoring.   : improved BP. Improved Cr. Improved Urine output. Pain controlled. No nausea. Intermittent confusion but easily re-oriented.   : in restraints, not in acute distress   : more alert  5/10 -seen in the morning,  alert,  knows his name, states he wants to sleep some more. denies abdo pain. ROS limited due to underlying dementia/delirium but he does not appear to be in acute distress    - seen this morning - shortness of breath, on NRM - gave lasix upgraded to CICU for bipap   - this morning tolerating bipap and looking more comfortable   - on bipap, titrating fio2, awake, alert, ROS limited but does not appear to be in distress, is NPO on PPN ; worsening leukocytosis    - seen in AM - on bipap, comfortable, appears dry, awake, alert  5/15 - looks better today, interactive, denies cp palps sob able to state his name   : + [place], visibly dehydrated with dry mucosals/skin, not in distress, FQ to gravity  Other ROS reviewed and neg     T(C): 36.4 (22 @ 10:00), Max: 36.4 (22 @ 05:00)  HR: 90 (22 @ 15:00) (72 - 93)  BP: 120/68 (22 @ 15:00) (115/69 - 151/79)  RR: --  SpO2: 97% (22 @ 15:00) (90% - 98%)    05-15-22 @ 07:01  -  22 @ 07:00  --------------------------------------------------------  IN: 0 mL / OUT: 2450 mL / NET: -2450 mL    Daily Weight in k (16 May 2022 02:33)                        10.7   24.56 )-----------( 189      ( 16 May 2022 05:32 )             32.9     16 May 2022 05:32    149    |  117    |  87     ----------------------------<  156    4.3     |  29     |  1.12     Ca    10.2       16 May 2022 05:32  Phos  3.0       16 May 2022 05:32  Mg     2.1       16 May 2022 05:32    TPro  4.6    /  Alb  1.5    /  TBili  0.3    /  DBili  x      /  AST  17     /  ALT  19     /  AlkPhos  80     16 May 2022 05:32    LIVER FUNCTIONS - ( 16 May 2022 05:32 )  Alb: 1.5 g/dL / Pro: 4.6 gm/dL / ALK PHOS: 80 U/L / ALT: 19 U/L / AST: 17 U/L / GGT: x           chlorhexidine 4% Liquid 1 Application(s) Topical <User Schedule>  fat emulsion (Fish Oil and Plant Based) 20% Infusion 0.92 Gm/kG/Day IV Continuous <Continuous>  fat emulsion (Fish Oil and Plant Based) 20% Infusion 0.92 Gm/kG/Day IV Continuous <Continuous>  heparin   Injectable 5000 Unit(s) SubCutaneous every 12 hours  meropenem  IVPB 1000 milliGRAM(s) IV Intermittent every 12 hours  methylPREDNISolone sodium succinate Injectable 40 milliGRAM(s) IV Push every 12 hours  metoprolol tartrate Injectable 5 milliGRAM(s) IV Push every 6 hours  ondansetron Injectable 4 milliGRAM(s) IV Push every 8 hours PRN  pantoprazole  Injectable 40 milliGRAM(s) IV Push daily  Parenteral Nutrition - Adult 1 Each TPN Continuous <Continuous>  Parenteral Nutrition - Adult 1 Each TPN Continuous <Continuous>  prochlorperazine   Injectable 10 milliGRAM(s) IntraMuscular every 8 hours PRN  silver sulfADIAZINE 1% Cream 1 Application(s) Topical daily  sodium chloride 0.9%. 1000 milliLiter(s) IV Continuous <Continuous>    Constitutional: alert male in NAD  HEENT:  EOMI, PERRLA, Dry mucosals  Neck: supple  Back: no tenderness  Respiratory: CTA BL with diminished BSA at bases and some crackles  Cardiovascular: S1, S2 regular, I/VI JONY  Abdomen: appropriately tenders soft; ; hypoactive bowel sounds; Midline surgical incision site with soft surgical dressing; clean and dry  : Kc to gravity  Musculoskeletal: no muscle tenderness, no joint swelling or tenderness  Extremities: no pedal edema  Neurological: AxOx2, moving all extremities, no focal deficits  Skin: no rash

## 2022-05-16 NOTE — PROGRESS NOTE ADULT - ASSESSMENT
Post op pneumonia, slowly improving. Severe post op malnutrition. Repeat swallow evaluation. TPN. IV abx. Pulmonary follow up.

## 2022-05-16 NOTE — PROGRESS NOTE ADULT - ASSESSMENT
89 y/o Male with h/o colon Ca s/p resection ~15y ago, SSS ss/p PM, HTN, BPH, b/l THR was admitted on 5/7 for worsening mid-epigastric pain x 2d. Mild nausea, no vomiting/diarrhea/constipation. No fever/chills at home. No clear precipitating factor. On 5/5 he underwent an exploratory laparotomy and small bowel resection. He was given cefotetan IV. Postoperative, he became hypotensive, but improved with IV fluids. Alert, but confused. He remained on cefotetan.    1. B/l pneumonia. Probable aspiration pneumonia. SBO s/p SBR. CRF stage 3. Prior colon Ca s/p resection. B/l pleural effusions. Encephalopathy. Allergy to PCN.   -leukocytosis is worse  -respiratory frail, but improving  -s/p BiPAP  -he remains at risk for aspiration; likely dysphagia  s/p cefotetan  -on meropenem 1 gm IV q12h # 6  -tolerating abx well so far; no side effects noted  -continue abx coverage   -monitor abdomen  -monitor temps  -f/u CBC  -supportive care  2. Other issues:   -care per medicine    d/w medical team

## 2022-05-16 NOTE — CHART NOTE - NSCHARTNOTEFT_GEN_A_CORE
Clinical Nutrition PPN Follow Up Note    *89 yo male admitted with high grad distal SBO, YAMINI on CKD now s/p ex lap on 5/5.  hypercalcemia 2/2 dehydration and primary hyperparathyroidism. Pt started on PPN on 5/5 2/2 SBO and extended time NPO.  *5/9: As per surgery on 5/6: "s/p Expl lap, SBR for high grade SBO. Pt likely volume depleted with low urine output, elevated CR. Will bolus IVF to treat low UO."  Nephrology is following. Pt transferred to CICU for closer monitoring.   NGT pulled out on 5/5, pt refused replacement. Urine output improving. S/P SLP evaluation with recommendations regular consistency with thin liquids when medically feasible    **5/11: S/P RRT on 5/10 for hypoxia; encephalopathy 2/2 hypoxia; on venti-mask, pt now NPO.  CT chest shows consistent with b/l infiltrates.  worsening leukocytosis.  d/w Surgery PA, PN on hold for 5/10 and 5/11, lasix being given.    *of note, pt had urine output of 1300mL plus 1 undocumented urine episode with 4 episodes of diarrhea in the past 24hours.  (+1) Lt/Rt ankle, (+2) Lt/Rt arm edema documented.  ********pt was infused IVF at 80mL/hr on 5/8, 5/9, and 5/10 (~1900mL additional fluid volume).    *5/12: continues NPO on bipap until resp and mental status improve; s/p transferred to CICU. PPN on hold x 2 days but to re-start today as per Dr. Pete. I&O's not being appropriately/ accurately documented so unable to determine accurate fluid balance - will start total PN volume at 2000 mL and monitor/ adjust tomorrow based on labs/ I&O's. Strict and accurate I&O's are essential when pt is on PPN.   Now pending palliative care consult - highly rec'd to confirm goals of care regarding long-term nutrition support.    *5/14: remains NPO and on bipap. Is high risk for aspiration. Currently w/ post-op pneumonia. Day #9 of PPN -Discussed with Surgical PA regarding PICC line placement stated will order consult today. Will continue with PPN until line placed then will start TPN to meet > 80% of ENN.     *5/15: pt started on PPN on 5/5/22 due to SBO and extended time NPO, remains on PPN due to bipap.  Today (5/15/22), is day # 10 on PPN, rec'd PICC line placement for complete nutr needs to be met. c/w hypernatremia, being followed by nephrology, nephrology rec'd to limit lasix as pt with intravascular volume depletion and subcut edema due to third spacing.  continues on bipap and NPO.    *current status: remains NPO and day #11 of PPN - c/w hypernatremia. As per hospitalist note on 5/15 - IV lasix now d/c. Pt is very dry w/ no pedal edema. No palliative care consult at [facility] time - pt remains full code. RD spoke to surgical PA about PICC line placement again.    *labs reviewed; hypernatremia remains unchanged, will increase volume (total volume now 2400 mL) and decrease sodium in PN bag to 110 mEq; potassium and magnesium now WNL - will monitor K closely.  corrected Ca elevated.  bilirubin total WNL.  triglycerides within limits to c/w lipids.  no POCT ordered or checked; per protocol - while pt is on PPN POCT should be checked q6hrs due to high risk for hyper/hypoglycemia.    05-16    149<H>  |  117<H>  |  87<H>  ----------------------------<  156<H>  4.3   |  29  |  1.12    Ca    10.2<H>      16 May 2022 05:32  Phos  3.0     05-16  Mg     2.1     05-16    TPro  4.6<L>  /  Alb  1.5<L>  /  TBili  0.3  /  DBili  x   /  AST  17  /  ALT  19  /  AlkPhos  80  05-16    Lipid Panel: Date/Time: 05-13-22 @ 06:31  Triglycerides, Serum: 271    *pertinent meds: heparin, protonix, zofran, prochlorperazine     *I&O's Detail    15 May 2022 07:01  -  16 May 2022 07:00  --------------------------------------------------------  IN:  Total IN: 0 mL    OUT:    Indwelling Catheter - Urethral (mL): 2450 mL  Total OUT: 2450 mL    Total NET: -2450 mL  * fluid status: negative net volume. however, no intake documented. STRICT I&O'S ARE NECESSARY WHILE PT IS ON PPN TO DETERMINE FLUID BALANCE - especially in lieu of continued hypernatremia.  will monitor and adjust PN volume prn.     *BM (+) 5/16; small, creamy.  Pt not on bowel regimen.   *Mahesh Score of 13; stg 1 PI on sacrum documented. (+2) Lt arm edema documented.    *Malnutrition: Pt continues to meet criteria for severe malnutrition in acute on chronic illness r/t decreased ability to consume sufficient nutr 2/2 SBO on CA AEB meeting <50% of ENN x 7 days, severe muscle/ fat wasting    Estimated Needs: based on 55Kg (wt from 5/5, bedscale wt obtained by RD)  Calories: 5182-3447 Kcal (30-35 Kcal/Kg)  Protein:   g protein (1.5-2g/Kg)  Fluids:  6860-1027 mL (30-35mL/Kg)    Diet, NPO    Weight History: no significant changes x 2 days (admission weight may be inaccurate as there is a large discrepancy)   64 kg (5/16)  62.4Kg (5/15)  66 kg (5/8)  66 Kg (5/7)  Height (cm): 175.3 (05-01-22 @ 08:41)  Weight (kg): 54.4 (05-01-22 @ 08:41)  BMI (kg/m2): 17.7 (05-01-22 @ 08:41)  BSA (m2): 1.66 (05-01-22 @ 08:41)    PPN Recommendations: via peripheral venous line  Total Volume: 2300 mL  110 g  Amino Acids  150 g Dextrose  50 g Lipids 20%  97 mEq Sodium Chloride  0 mEq Sodium Acetate  10 mmol Sodium Phosphate  20 mEq Potassium Chloride  25 mEq Potassium Acetate  0 mmol Potassium Phosphate  0 mEq Calcium Gluconate    (Corrected Ca elevated - do NOT replete outside bag)  0 mEq Magnesium Sulfate  100 mg Thiamine  1 ml Trace   10 ml MVI    Total Calories:  1450  (Meets  88%  of  Estimated Energy needs  and   100%  Protein needs)     (osmolarity <900)      Recommendations:  1) Daily weights  2) Strict and accurate I & O's to closely monitor fluid balance  3) Daily lyte checks - including Mg and Phos  4) Weekly triglycerides/LFT checks  5) POCT q6 hours - maintain BG levels between 140- 180 mg/dL ***(Order POCT's as per protocol - pt at high risk for hyperglycemia when PPN infusing).  6) Confirm goals of care regarding long-term nutrition support   7) place central line to provide TPN and meet 100% ENN    *will monitor and adjust treatement plan prn*  Deana Glynn MS, RDN, [phone] Clinical Nutrition PPN Follow Up Note    *89 yo male admitted with high grad distal SBO, YAMINI on CKD now s/p ex lap on 5/5.  hypercalcemia 2/2 dehydration and primary hyperparathyroidism. Pt started on PPN on 5/5 2/2 SBO and extended time NPO.  *5/9: As per surgery on 5/6: "s/p Expl lap, SBR for high grade SBO. Pt likely volume depleted with low urine output, elevated CR. Will bolus IVF to treat low UO."  Nephrology is following. Pt transferred to CICU for closer monitoring.   NGT pulled out on 5/5, pt refused replacement. Urine output improving. S/P SLP evaluation with recommendations regular consistency with thin liquids when medically feasible    **5/11: S/P RRT on 5/10 for hypoxia; encephalopathy 2/2 hypoxia; on venti-mask, pt now NPO.  CT chest shows consistent with b/l infiltrates.  worsening leukocytosis.  d/w Surgery PA, PN on hold for 5/10 and 5/11, lasix being given.    *of note, pt had urine output of 1300mL plus 1 undocumented urine episode with 4 episodes of diarrhea in the past 24hours.  (+1) Lt/Rt ankle, (+2) Lt/Rt arm edema documented.  ********pt was infused IVF at 80mL/hr on 5/8, 5/9, and 5/10 (~1900mL additional fluid volume).    *5/12: continues NPO on bipap until resp and mental status improve; s/p transferred to CICU. PPN on hold x 2 days but to re-start today as per Dr. Pete. I&O's not being appropriately/ accurately documented so unable to determine accurate fluid balance - will start total PN volume at 2000 mL and monitor/ adjust tomorrow based on labs/ I&O's. Strict and accurate I&O's are essential when pt is on PPN.   Now pending palliative care consult - highly rec'd to confirm goals of care regarding long-term nutrition support.    *5/14: remains NPO and on bipap. Is high risk for aspiration. Currently w/ post-op pneumonia. Day #9 of PPN -Discussed with Surgical PA regarding PICC line placement stated will order consult today. Will continue with PPN until line placed then will start TPN to meet > 80% of ENN.     *5/15: pt started on PPN on 5/5/22 due to SBO and extended time NPO, remains on PPN due to bipap.  Today (5/15/22), is day # 10 on PPN, rec'd PICC line placement for complete nutr needs to be met. c/w hypernatremia, being followed by nephrology, nephrology rec'd to limit lasix as pt with intravascular volume depletion and subcut edema due to third spacing.  continues on bipap and NPO.    *current status: remains NPO and day #11 of PPN - c/w hypernatremia. As per hospitalist note on 5/15 - IV lasix now d/c. Pt is very dry w/ no pedal edema. No palliative care consult at [facility] time - pt remains full code. RD spoke to surgical PA about PICC line placement again.    *labs reviewed; hypernatremia remains unchanged, will increase volume (total volume now 2400 mL) and decrease sodium in PN bag to 110 mEq; potassium and magnesium now WNL - will monitor K closely.  corrected Ca elevated.  bilirubin total WNL.  triglycerides within limits to c/w lipids.  no POCT ordered or checked; per protocol - while pt is on PPN POCT should be checked q6hrs due to high risk for hyper/hypoglycemia.    05-16    149<H>  |  117<H>  |  87<H>  ----------------------------<  156<H>  4.3   |  29  |  1.12    Ca    10.2<H>      16 May 2022 05:32  Phos  3.0     05-16  Mg     2.1     05-16    TPro  4.6<L>  /  Alb  1.5<L>  /  TBili  0.3  /  DBili  x   /  AST  17  /  ALT  19  /  AlkPhos  80  05-16    Lipid Panel: Date/Time: 05-13-22 @ 06:31  Triglycerides, Serum: 271    *pertinent meds: heparin, protonix, zofran, prochlorperazine     *I&O's Detail    15 May 2022 07:01  -  16 May 2022 07:00  --------------------------------------------------------  IN:  Total IN: 0 mL    OUT:    Indwelling Catheter - Urethral (mL): 2450 mL  Total OUT: 2450 mL    Total NET: -2450 mL  * fluid status: negative net volume. however, no intake documented. STRICT I&O'S ARE NECESSARY WHILE PT IS ON PPN TO DETERMINE FLUID BALANCE - especially in lieu of continued hypernatremia.  will monitor and adjust PN volume prn.     *BM (+) 5/16; small, creamy.  Pt not on bowel regimen.   *Mahesh Score of 13; stg 1 PI on sacrum documented. (+2) Lt arm edema documented.    *Malnutrition: Pt continues to meet criteria for severe malnutrition in acute on chronic illness r/t decreased ability to consume sufficient nutr 2/2 SBO on CA AEB meeting <50% of ENN x 7 days, severe muscle/ fat wasting    Estimated Needs: based on 55Kg (wt from 5/5, bedscale wt obtained by RD)  Calories: 1156-6613 Kcal (30-35 Kcal/Kg)  Protein:   g protein (1.5-2g/Kg)  Fluids:  4619-4797 mL (30-35mL/Kg)    Diet, NPO    Weight History: no significant changes x 2 days (admission weight may be inaccurate as there is a large discrepancy)   64 kg (5/16)  62.4Kg (5/15)  66 kg (5/8)  66 Kg (5/7)  Height (cm): 175.3 (05-01-22 @ 08:41)  Weight (kg): 54.4 (05-01-22 @ 08:41)  BMI (kg/m2): 17.7 (05-01-22 @ 08:41)  BSA (m2): 1.66 (05-01-22 @ 08:41)    PPN Recommendations: via peripheral venous line  Total Volume: 2400 mL  110 g  Amino Acids  150 g Dextrose  50 g Lipids 20%  97 mEq Sodium Chloride  0 mEq Sodium Acetate  10 mmol Sodium Phosphate  20 mEq Potassium Chloride  25 mEq Potassium Acetate  0 mmol Potassium Phosphate  0 mEq Calcium Gluconate    (Corrected Ca elevated - do NOT replete outside bag)  0 mEq Magnesium Sulfate  100 mg Thiamine  1 ml Trace   10 ml MVI    Total Calories:  1450  (Meets  88%  of  Estimated Energy needs  and   100%  Protein needs)     (osmolarity <900)      Recommendations:  1) Daily weights  2) Strict and accurate I & O's to closely monitor fluid balance  3) Daily lyte checks - including Mg and Phos  4) Weekly triglycerides/LFT checks  5) POCT q6 hours - maintain BG levels between 140- 180 mg/dL ***(Order POCT's as per protocol - pt at high risk for hyperglycemia when PPN infusing).  6) Confirm goals of care regarding long-term nutrition support   7) place central line to provide TPN and meet 100% ENN    *will monitor and adjust treatement plan prn*  Deana Glynn MS, RDN, [phone]

## 2022-05-17 NOTE — CHART NOTE - NSCHARTNOTEFT_GEN_A_CORE
Pt in MSOF/MODS: resp failure, shock requiring pressors, YAMINI  He is on vasopressor therapy for shock  He is on a 100% NRB for respiratory support    Concern by nursing staff as patient with frequent melena stool. I discussed this with wife Ana as patient may require blood transfusion and trending of hgb as appears to have GIB.   Aan - HCP, stated she does not want any blood transfusions or increased blood sampling. Reviewed wishes and capping of vasopressor at this time, agreed with medical decisions as likely would not change outcome given condition and critical status. Provided supportive care.  MOLST form updated. All questions answered.

## 2022-05-17 NOTE — PROGRESS NOTE ADULT - SUBJECTIVE AND OBJECTIVE BOX
Subjective:    pat low BP not responsive to iv fluids, transfer to CCU, on phenylephrine infusion.    Home Medications:  Advil Dual Action With Acetaminophen 250 mg-125 mg oral tablet: 2 tab(s) orally once a day, As Needed (01 May 2022 21:57)  alfuzosin 10 mg oral tablet, extended release: 1 tab(s) orally once a day (01 May 2022 21:57)  amLODIPine 5 mg oral tablet: 1 tab(s) orally once a day (01 May 2022 21:57)  azelastine nasal: nasal once a day (01 May 2022 21:57)  hydroCHLOROthiazide 25 mg oral tablet: 1 tab(s) orally once a day (01 May 2022 21:57)  losartan 25 mg oral tablet: 1/2  tab(s) orally once a day (01 May 2022 21:57)  metoprolol succinate 25 mg oral tablet, extended release: 1 tab(s) orally once a day (01 May 2022 21:57)    MEDICATIONS  (STANDING):  albumin human 25% IVPB 100 milliLiter(s) IV Intermittent <User Schedule>  chlorhexidine 4% Liquid 1 Application(s) Topical <User Schedule>  fat emulsion (Fish Oil and Plant Based) 20% Infusion 0.92 Gm/kG/Day (20.83 mL/Hr) IV Continuous <Continuous>  fat emulsion (Fish Oil and Plant Based) 20% Infusion 0.92 Gm/kG/Day (20.83 mL/Hr) IV Continuous <Continuous>  meropenem  IVPB 1000 milliGRAM(s) IV Intermittent every 12 hours  methylPREDNISolone sodium succinate Injectable 40 milliGRAM(s) IV Push every 12 hours  pantoprazole  Injectable 40 milliGRAM(s) IV Push every 12 hours  Parenteral Nutrition - Adult 1 Each (104 mL/Hr) TPN Continuous <Continuous>  Parenteral Nutrition - Adult 1 Each (100 mL/Hr) TPN Continuous <Continuous>  phenylephrine    Infusion 0.5 MICROgram(s)/kG/Min (10.2 mL/Hr) IV Continuous <Continuous>  silver sulfADIAZINE 1% Cream 1 Application(s) Topical daily  sodium chloride 0.9%. 1000 milliLiter(s) (80 mL/Hr) IV Continuous <Continuous>    MEDICATIONS  (PRN):  ondansetron Injectable 4 milliGRAM(s) IV Push every 8 hours PRN Nausea and/or Vomiting  prochlorperazine   Injectable 10 milliGRAM(s) IntraMuscular every 8 hours PRN Nasuea/Vomiting      Allergies    amoxicillin (Other (Severe))  penicillin (Unknown)    Intolerances        Vital Signs Last 24 Hrs  T(C): 36.4 (17 May 2022 08:54), Max: 36.4 (17 May 2022 08:54)  T(F): 97.5 (17 May 2022 08:54), Max: 97.5 (17 May 2022 08:54)  HR: 100 (17 May 2022 12:15) (75 - 112)  BP: 118/65 (17 May 2022 12:15) (69/49 - 139/97)  BP(mean): 77 (17 May 2022 12:15) (52 - 108)  RR: 31 (17 May 2022 12:15) (20 - 32)  SpO2: 99% (17 May 2022 12:15) (80% - 100%)      PHYSICAL EXAMINATION:    NECK:  Supple. No lymphadenopathy. Jugular venous pressure not elevated. Carotids equal.   HEART:   The cardiac impulse has a normal quality. Reg., Nl S1 and S2.  There are no murmurs, rubs or gallops noted  CHEST:  Chest crackles to auscultation. Normal respiratory effort.  ABDOMEN:  Soft and nontender.   EXTREMITIES:  There is no edema.       LABS:                        10.8   19.96 )-----------( 176      ( 17 May 2022 11:25 )             33.5     05-17    148<H>  |  121<H>  |  128<H>  ----------------------------<  147<H>  4.8   |  18<L>  |  1.62<H>    Ca    10.2<H>      17 May 2022 11:25  Phos  2.8     05-17  Mg     2.0     05-17    TPro  4.3<L>  /  Alb  1.6<L>  /  TBili  0.7  /  DBili  x   /  AST  61<H>  /  ALT  70  /  AlkPhos  90  05-17

## 2022-05-17 NOTE — PROGRESS NOTE ADULT - ASSESSMENT
89 y/o Male with h/o colon Ca s/p resection ~15y ago, SSS ss/p PM, HTN, BPH, b/l THR was admitted on 5/7 for worsening mid-epigastric pain x 2d. Mild nausea, no vomiting/diarrhea/constipation. No fever/chills at home. No clear precipitating factor. On 5/5 he underwent an exploratory laparotomy and small bowel resection. He was given cefotetan IV. Postoperative, he became hypotensive, but improved with IV fluids. Alert, but confused. He remained on cefotetan.    1. B/l pneumonia. Probable aspiration pneumonia. SBO s/p SBR. CRF stage 3. Prior colon Ca s/p resection. B/l pleural effusions. Encephalopathy. Allergy to PCN.   -leukocytosis is improving  -respiratory frail, but improving  -s/p BiPAP  -he remains at risk for aspiration; likely dysphagia  s/p cefotetan  -on meropenem 1 gm IV q12h # 7  -tolerating abx well so far; no side effects noted  -continue abx coverage   -monitor abdomen  -monitor temps  -f/u CBC  -supportive care  2. Other issues:   -care per medicine    d/w medical team

## 2022-05-17 NOTE — PROCEDURE NOTE - NSPROCDETAILS_GEN_ALL_CORE
guidewire recovered/lumen(s) aspirated and flushed/sterile dressing applied/sterile technique, catheter placed/ultrasound guidance with use of sterile gel and probe cove
Seldinger technique/dressing applied/secured in place/sterile dressing applied/Trendelenburg position/thoracostomy tube placed percutaneously/ultrasound assessment of fluid (location)
Seldinger technique/dressing applied/secured in place/sterile dressing applied/Trendelenburg position/percutaneous/thoracostomy tube placed percutaneously/ultrasound assessment of fluid (location)
location identified, draped/prepped, sterile technique used, needle inserted/introduced/positive blood return obtained via catheter/connected to a pressurized flush line/sutured in place/hemostasis with direct pressure, dressing applied/Seldinger technique/all materials/supplies accounted for at end of procedure
nasogastric/gastric secretions aspirated, placement confirmed/connected to suction

## 2022-05-17 NOTE — PROGRESS NOTE ADULT - SUBJECTIVE AND OBJECTIVE BOX
Date of service: 05-17-22 @ 08:14    Sitting in bed in NAD  Alert  Comfortable   No SOB at rest  Has dry cough    ROS: no fever or chills; denies dizziness, no HA, denies abdominal pain, no diarrhea or constipation; no dysuria, no legs pain, no rashes    MEDICATIONS  (STANDING):  chlorhexidine 4% Liquid 1 Application(s) Topical <User Schedule>  fat emulsion (Fish Oil and Plant Based) 20% Infusion 0.92 Gm/kG/Day (20.83 mL/Hr) IV Continuous <Continuous>  heparin   Injectable 5000 Unit(s) SubCutaneous every 12 hours  lactated ringers Bolus 500 milliLiter(s) IV Bolus once  meropenem  IVPB 1000 milliGRAM(s) IV Intermittent every 12 hours  methylPREDNISolone sodium succinate Injectable 40 milliGRAM(s) IV Push every 12 hours  metoprolol tartrate Injectable 5 milliGRAM(s) IV Push every 6 hours  pantoprazole  Injectable 40 milliGRAM(s) IV Push daily  Parenteral Nutrition - Adult 1 Each (100 mL/Hr) TPN Continuous <Continuous>  silver sulfADIAZINE 1% Cream 1 Application(s) Topical daily  sodium chloride 0.9%. 1000 milliLiter(s) (80 mL/Hr) IV Continuous <Continuous>    Vital Signs Last 24 Hrs  T(C): 36.3 (17 May 2022 06:18), Max: 36.4 (16 May 2022 10:00)  T(F): 97.3 (17 May 2022 06:18), Max: 97.5 (16 May 2022 10:00)  HR: 107 (17 May 2022 07:02) (75 - 112)  BP: 91/59 (17 May 2022 07:02) (89/58 - 141/80)  BP(mean): 68 (17 May 2022 07:02) (65 - 108)  RR: --  SpO2: 96% (17 May 2022 07:02) (80% - 100%)     Physical exam:    Constitutional:  frail looking male  HEENT: NC/AT  Neck: supple; thyroid not palpable  Back: no tenderness  Respiratory: respiratory effort normal; crackles at bases  Cardiovascular: S1S2 regular, no murmurs  Abdomen: soft, not tender, not distended, positive BS; midline incision intact  Genitourinary: no suprapubic tenderness  Musculoskeletal: no muscle tenderness, no joint swelling or tenderness  Extremities: no pedal edema  Neurological/ Psychiatric: obtunded, moving all extremities  Skin: no rashes; no palpable lesions    Labs: reviewed                        12.7   13.13 )-----------( 199      ( 17 May 2022 05:29 )             40.1     05-17    150<H>  |  119<H>  |  118<H>  ----------------------------<  157<H>  4.6   |  23  |  1.45<H>    Ca    9.8      17 May 2022 05:29  Phos  2.8     05-17  Mg     2.0     05-17    TPro  4.3<L>  /  Alb  1.6<L>  /  TBili  0.7  /  DBili  x   /  AST  61<H>  /  ALT  70  /  AlkPhos  90  05-17                        10.7   24.56 )-----------( 189      ( 16 May 2022 05:32 )             32.9     05-16    149<H>  |  117<H>  |  87<H>  ----------------------------<  156<H>  4.3   |  29  |  1.12    Ca    10.2<H>      16 May 2022 05:32  Phos  3.0     05-16  Mg     2.1     05-16    TPro  4.6<L>  /  Alb  1.5<L>  /  TBili  0.3  /  DBili  x   /  AST  17  /  ALT  19  /  AlkPhos  80  05-16                                   11.4   17.44 )-----------( 116      ( 11 May 2022 07:03 )             34.4     05-11    141  |  114<H>  |  49<H>  ----------------------------<  135<H>  4.1   |  18<L>  |  1.39<H>    Ca    9.6      11 May 2022 05:40  Phos  3.4     05-11  Mg     2.1     05-11    TPro  5.4<L>  /  Alb  1.7<L>  /  TBili  0.4  /  DBili  x   /  AST  35  /  ALT  25  /  AlkPhos  104  05-11    Cultures:       Culture - Blood (collected 07 May 2022 09:46)  Source: .Blood None  Final Report (12 May 2022 17:00):    No Growth Final    Culture - Blood (collected 07 May 2022 09:40)  Source: .Blood None  Final Report (12 May 2022 17:00):    No Growth Final    Radiology: all available radiological tests reviewed    < from: CT Abdomen and Pelvis w/ Oral Cont (05.04.22 @ 14:53) >  Worsening small bowel obstruction with slight increase in dilatation. Increasing ascites.  Developing small bilateral pleural effusions.  < end of copied text >    < from: CT Chest No Cont (05.11.22 @ 08:37) >  Interval development of bilateral patchy opacities in the lungs most   pronounced in the upper lobes raising concern for infection. Interval   developmentof atelectasis of the right lower lobe and increased partial   atelectasis left lower lobe. Moderate bilateral pleural effusions have increased.  < end of copied text >      Advanced directives addressed: full resuscitation

## 2022-05-17 NOTE — PROGRESS NOTE ADULT - ASSESSMENT
A/P: 89 male with Acute Hypoxic Respiratory failure frol Large B/L pleural effusions, septic shock from possible asp Pneumonia, Recurrent YAMINI, and altered awareness    Plan:  Transferred to the CCU    PULM: Will have B/L pigtails placed for respiratory comfort.  Likely worsening from the PPN, will start Albumin and low dose diuretics, continue meropenam    Cardio: Hypotensive from septic shock, continue brit.  NO MORE VOL resuscitation as he is total body vol overloaded.  Wean brit if BP tolerates    Renal: In YAMINI likely from ATN.  Continue TPN    GI: NPO, PPN.  Will start TPN tomorrow after the CVL is placed , PPI    ID: Septic shock from possible aspiration pna.  CT A/P to R/O intraabdominal process.      Add SQH DVT Prophylaxis    GOC: DNR/DNI

## 2022-05-17 NOTE — PROVIDER CONTACT NOTE (CHANGE IN STATUS NOTIFICATION) - SITUATION
Pt came in for SBO, S/p exploratory lap/open bowel resection. BIpap at night, HR in the 110's. Denies any Pain and afebrile.
Patient's blood pressure =85/45, P=55
Pt hypotensive, see flowsheet.
Patient lethargic this morning and on a nonrebreather O2 Sat 88-90%, RR;26, BP: 110/48, HR:98

## 2022-05-17 NOTE — PHYSICAL THERAPY INITIAL EVALUATION ADULT - PERTINENT HX OF CURRENT PROBLEM, REHAB EVAL
89M hx colon ca s/p resection in 2004 here with severe abdominal  pain. Symptoms started 2days ago and got worse.  Mild nausea, no vomiting/diarrhea/constipation. No fever/chills. No clear precipitating factor. Mild tenderness "but it also hurts when I'm not touching it". Claims he had a BM yesterday. Has not really been eating due to pain. The patient had ventral hernia repair in July 2021. CT abd shows high grade distal SBO.
89 y/o Male with h/o colon Ca s/p resection ~15y ago, SSS ss/p PM, HTN, BPH, b/l THR was admitted on 5/7 for worsening mid-epigastric pain x 2d. Mild nausea, no vomiting/diarrhea/constipation. No fever/chills at home. No clear precipitating factor. On 5/5 he underwent an exploratory laparotomy and small bowel resection. He was given cefotetan IV. Postoperative, he became hypotensive, but improved with IV fluids

## 2022-05-17 NOTE — PROCEDURE NOTE - NSPOSTPRCRAD_GEN_A_CORE
post-procedure radiography performed
central line located in the/central line located in the superior vena cava/no pneumothorax/post-procedure radiography performed
chest tube in correct position
chest tube in correct position

## 2022-05-17 NOTE — PROVIDER CONTACT NOTE (OTHER) - NAME OF MD/NP/PA/DO NOTIFIED:
Dr Ronquillo
MD Ndiaye
Dr Krishnan
Dr. Carpenter here on unit
Dr. Oliva
Surgical CHIOMA Shrestha
surgery Vivian
Patient Bladder scan 590cc

## 2022-05-17 NOTE — PROVIDER CONTACT NOTE (CHANGE IN STATUS NOTIFICATION) - ASSESSMENT
PAtient is alert and oriented, afebrile, surgical site is clean and dry. No swelling noted
Pt hypotensive & tachypneic, 02 sat 92% and above on 4LNC.
Pt sinus tach in the 110's but BP soft during the night. Held metoprolol a midnight due to BP being outside parameter.
Patient lethargic this morning and on a nonrebreather O2 Sat 88-90%, RR 26. Patient on PPN and fat emulsion. Lung sounds coarse crackles. Patient CR elevated and has not received IV lasix. CXR this morning showed there was fluid in patients lung.

## 2022-05-17 NOTE — PROCEDURE NOTE - NSPROCNAME_GEN_A_CORE
Chest Tube
Central Line Insertion
Gastric Intubation/Gastric Lavage
Chest Tube
Arterial Puncture/Cannulation

## 2022-05-17 NOTE — PROCEDURE NOTE - NSFINDINGS_GEN_A_CORE
NGT placement shallow, advanced, pending repeat imaging to confirm placement
serous fluid
serous fluid

## 2022-05-17 NOTE — CHART NOTE - NSCHARTNOTEFT_GEN_A_CORE
INTERVAL HPI/OVERNIGHT EVENTS:  Pt. seen and examined at bedside, hypoxic, bP 72/52, hr 110, alert and oriented x2, + shortness of breathe    Vital Signs Last 24 Hrs  T(C): 36.4 (17 May 2022 08:54), Max: 36.4 (17 May 2022 08:54)  T(F): 97.5 (17 May 2022 08:54), Max: 97.5 (17 May 2022 08:54)  HR: 100 (17 May 2022 17:00) (75 - 112)  BP: 98/53 (17 May 2022 14:30) (69/49 - 139/97)  BP(mean): 64 (17 May 2022 14:30) (52 - 108)  RR: 26 (17 May 2022 17:00) (20 - 32)  SpO2: 95% (17 May 2022 17:00) (80% - 100%)    PHYSICAL EXAM:    GENERAL: NAD  HEAD:  Atraumatic, Normocephalic  EYES: EOMI, PERRLA, conjunctiva and sclera clear  CHEST/LUNG: Clear to ausculation, bilaterally   HEART: S1S2  ABDOMEN: non distended, +BS, soft, non tender, no guarding  EXTREMITIES:  calf soft, non tender b/l. 2+ distal pulses b/l.     I&O's Detail    16 May 2022 07:01  -  17 May 2022 07:00  --------------------------------------------------------  IN:    Fat Emulsion (Fish Oil &amp; Plant Based) 20% Infusion: 437 mL    IV PiggyBack: 50 mL    PPN (Peripheral Parenteral Nutrition): 3900 mL    sodium chloride 0.9%: 1440 mL  Total IN: 5827 mL    OUT:    Indwelling Catheter - Urethral (mL): 1400 mL  Total OUT: 1400 mL    Total NET: 4427 mL          LABS:                        10.8   19.96 )-----------( 176      ( 17 May 2022 11:25 )             33.5     05-17    149<H>  |  120<H>  |  138<H>  ----------------------------<  121<H>  4.8   |  23  |  1.91<H>    Ca    10.1      17 May 2022 15:46  Phos  2.8     05-17  Mg     2.0     05-17    TPro  4.3<L>  /  Alb  1.6<L>  /  TBili  0.7  /  DBili  x   /  AST  61<H>  /  ALT  70  /  AlkPhos  90  05-17          type    RADIOLOGY & ADDITIONAL STUDIES:    Impression: 89y Male in acute respiratory distress, 2nd CHF, +pulmonary edema, Sepsis    Plan:  repeat CXR, am labs  ICU CONSULT, UPGRADE TO CCU  - START PHENYEPHRINE 1.4 MCQ  - TITRATE TO EFFECT   - CONT PPN AT THIS TIME   -will have d/w pt family about long term goals continued care  - consider palliative care consult  -D/w Dr nagy

## 2022-05-17 NOTE — PROCEDURE NOTE - ADDITIONAL PROCEDURE DETAILS
Dx: Pleural effusions, resp failure  Palliative/theraputic
Dx: Pleural effusions, resp failure  Palliative/theraputic
Dx: Septic shock    Procedure done under dynamic US guidance  Image obtained
Dx: Septic shock    Procedure done with dynamic US guidance  Image obtained

## 2022-05-17 NOTE — CONSULT NOTE ADULT - CONSULT REQUESTED DATE/TIME
11-May-2022 09:02
17-May-2022 10:06
05-May-2022 16:02
11-May-2022 16:18
08-May-2022 13:33
01-May-2022 22:09
02-May-2022 08:13

## 2022-05-17 NOTE — PROGRESS NOTE ADULT - ASSESSMENT
89M hx colon ca s/p resection in 2004 here with severe abdominal  pain. Symptoms started 2days ago and got worse.  Mild nausea, no vomiting/diarrhea/constipation. No fever/chills. No clear precipitating factor. Mild tenderness "but it also hurts when I'm not touching it". Claims he had a BM yesterday. Has not really been eating due to pain. The patient had ventral hernia repair in July 2021. CT abd shows high grade distal SBO.       A/P    Acute shock of unclear etiology with hypotension/hypoxia/confusion - initiated immediate w/u and Tx with labs, CXR, IV pressors and albumin, IVF bolus  - discussed with wife on the phone - as per wife pt previously expressed wished to be DNR/DNI, confirmed and documented, agreed on IVF and pressors.  CC PA evaluation  - transfer to CCU, continue on pressors.  All records reviewed - would reevaluate with CT chest and abd + TTE - potential cardiac vs hemorrhagic vs septic shock  CC time spent 45 min    SBO  YAMINI on CKD III due to nausea/vomiting and decreased PO intake POA and then Resolved then subsequent recurrence with post op YAMINI likely due to hypovolemia/hypotension improved  Acute Hypoxic resp failure likely to fluid overload and acute diastolic CHF exacerbation - resolved  Leukocytosis  post operative - risk of aspiration pneumonia, bacterial translocation in the gut   Anemia likely hemodilutional + blood loss related to surgery. More likely hemodilutional.   Hypercalcemia (multifactorial: dehydration + primary hyperparathyroidism)  HTN  BPH  SSS s/p PPM  toxic metabolic encephalopathy multifactorial   urinary retention and decreased urinary output  hypernatremia    Plan:  - admitted to surgery, originally on tele, then 1N, now being monitored on CICU  - Resp failure  - Hypernatremia with YAMINI - prerenal in etiology despite PPN and IVF  - on empiric abx coverage post op due to risk of colonic bacterial translocation and + BL infiltrates - possible aspiration PNA  - Normally on BB ARB amlodipine - now NPO - IV BB alone   - Flomax when able to take PO   - DNR/DNI   - IV pressors    Discussed with Dr. Gaytan  All available medical records personally reviewed 89M hx colon ca s/p resection in 2004 here with severe abdominal  pain. Symptoms started 2days ago and got worse.  Mild nausea, no vomiting/diarrhea/constipation. No fever/chills. No clear precipitating factor. Mild tenderness "but it also hurts when I'm not touching it". Claims he had a BM yesterday. Has not really been eating due to pain. The patient had ventral hernia repair in July 2021. CT abd shows high grade distal SBO.       A/P    Acute shock of unclear etiology with hypotension/hypoxia/confusion - initiated immediate w/u and Tx with labs, CXR, IV pressors and albumin, IVF bolus  - discussed with wife on the phone - as per wife pt previously expressed wished to be DNR/DNI, confirmed and documented, agreed on IVF and pressors.  CC PA evaluation  - transfer to CCU, continue on pressors  BL pleural effusion - thoracocentesis if BP allows  All records reviewed - would reevaluate with CT chest and abd + TTE - potential cardiac vs hemorrhagic vs septic shock  CC time spent 45 min    SBO  YAMINI on CKD III due to nausea/vomiting and decreased PO intake POA and then Resolved then subsequent recurrence with post op YAMINI likely due to hypovolemia/hypotension improved  Acute Hypoxic resp failure likely to fluid overload and acute diastolic CHF exacerbation - resolved  Leukocytosis  post operative - risk of aspiration pneumonia, bacterial translocation in the gut   Anemia likely hemodilutional + blood loss related to surgery. More likely hemodilutional.   Hypercalcemia (multifactorial: dehydration + primary hyperparathyroidism)  HTN  BPH  SSS s/p PPM  toxic metabolic encephalopathy multifactorial   urinary retention and decreased urinary output  hypernatremia    Plan:  - admitted to surgery, originally on tele, then 1N, now being monitored on CICU  - Resp failure  - Hypernatremia with YAMINI - prerenal in etiology despite PPN and IVF  - on empiric abx coverage post op due to risk of colonic bacterial translocation and + BL infiltrates - possible aspiration PNA  - Normally on BB ARB amlodipine - now NPO - IV BB alone   - Flomax when able to take PO   - DNR/DNI   - IV pressors    Discussed with Dr. Gaytan  All available medical records personally reviewed

## 2022-05-17 NOTE — PROVIDER CONTACT NOTE (OTHER) - REASON
MD Ndiaye
NG tube coiled
patients left arm with swelling.
did not void post mullen removal
consult
notified pcp
patient pulled NGT
Patients O2 is 84% on Nasal Cannula

## 2022-05-17 NOTE — CONSULT NOTE ADULT - SUBJECTIVE AND OBJECTIVE BOX
ICU Progress Note    HPI:    S:    Pt seen and examined  HD #  Pt here for    PRESSORS: [ ] YES [ ] NO  WHICH:  DOSE:    Allergies    amoxicillin (Other (Severe))  penicillin (Unknown)    Intolerances        MEDICATIONS  (STANDING):  albumin human 25% IVPB 100 milliLiter(s) IV Intermittent <User Schedule>  chlorhexidine 4% Liquid 1 Application(s) Topical <User Schedule>  fat emulsion (Fish Oil and Plant Based) 20% Infusion 0.92 Gm/kG/Day (20.83 mL/Hr) IV Continuous <Continuous>  furosemide   Injectable 40 milliGRAM(s) IV Push daily  heparin   Injectable 5000 Unit(s) SubCutaneous every 12 hours  meropenem  IVPB 1000 milliGRAM(s) IV Intermittent every 12 hours  methylPREDNISolone sodium succinate Injectable 40 milliGRAM(s) IV Push every 12 hours  metoprolol tartrate Injectable 5 milliGRAM(s) IV Push every 6 hours  pantoprazole  Injectable 40 milliGRAM(s) IV Push daily  Parenteral Nutrition - Adult 1 Each (100 mL/Hr) TPN Continuous <Continuous>  phenylephrine    Infusion 0.5 MICROgram(s)/kG/Min (10.2 mL/Hr) IV Continuous <Continuous>  silver sulfADIAZINE 1% Cream 1 Application(s) Topical daily  sodium chloride 0.9%. 1000 milliLiter(s) (80 mL/Hr) IV Continuous <Continuous>    MEDICATIONS  (PRN):  ondansetron Injectable 4 milliGRAM(s) IV Push every 8 hours PRN Nausea and/or Vomiting  prochlorperazine   Injectable 10 milliGRAM(s) IntraMuscular every 8 hours PRN Nasuea/Vomiting      Drug Dosing Weight  Height (cm): 175.3 (01 May 2022 08:41)  Weight (kg): 54.4 (01 May 2022 08:41)  BMI (kg/m2): 17.7 (01 May 2022 08:41)  BSA (m2): 1.66 (01 May 2022 08:41)    CENTRAL LINE: [ ] YES [ ] NO  LOCATION:   DATE INSERTED:  REMOVE: [ ] YES [ ] NO  EXPLAIN:    SARGENT: [ ] YES [ ] NO    DATE INSERTED:  REMOVE: [ ] YES [ ] NO  EXPLAIN:    A-LINE: [ ] YES [ ] NO  LOCATION:   DATE INSERTED:  REMOVE: [ ] YES [ ] NO  EXPLAIN:    PAST MEDICAL & SURGICAL HISTORY:  HTN (hypertension)      BPH (benign prostatic hyperplasia)      Ventral hernia      Colon cancer  h/o chemo 2004      Heart block      Pacemaker      HLD (hyperlipidemia)      H/O colectomy  2004      History of carpal tunnel surgery of left wrist      H/O hernia repair      History of total hip replacement, bilateral      History of appendectomy  ruptured      Cardiac pacemaker      H/O ventral hernia repair          FAMILY HISTORY:          ROS: See HPI; otherwise, all systems reviewed and negative.    O:    ICU Vital Signs Last 24 Hrs  T(C): 36.4 (17 May 2022 08:54), Max: 36.4 (17 May 2022 08:54)  T(F): 97.5 (17 May 2022 08:54), Max: 97.5 (17 May 2022 08:54)  HR: 102 (17 May 2022 08:30) (75 - 112)  BP: 88/54 (17 May 2022 08:30) (80/52 - 139/97)  BP(mean): 62 (17 May 2022 08:30) (60 - 108)  ABP: --  ABP(mean): --  RR: --  SpO2: 94% (17 May 2022 08:30) (80% - 100%)          I&O's Detail    16 May 2022 07:01  -  17 May 2022 07:00  --------------------------------------------------------  IN:    Fat Emulsion (Fish Oil &amp; Plant Based) 20% Infusion: 437 mL    IV PiggyBack: 50 mL    PPN (Peripheral Parenteral Nutrition): 3900 mL    sodium chloride 0.9%: 1440 mL  Total IN: 5827 mL    OUT:    Indwelling Catheter - Urethral (mL): 1400 mL  Total OUT: 1400 mL    Total NET: 4427 mL              PE:    Constitutional: Healthy M lying in bed in NAD.   Neck: No JVD, trachea midline.   Respiratory: CTA B/L good BS B/L no W/R/R.  Cardiovascular: S1S2+ RRR no M/R/G.  Gastrointestinal: Soft, NTND.  Extremities: No peripheral edema, No cyanosis, clubbing.  Neurological: Awake, conversive, alert, no gross deficits.  Skin: No rashes, warm, moist.    LABS:    CBC Full  -  ( 17 May 2022 05:29 )  WBC Count : 13.13 K/uL  RBC Count : 4.28 M/uL  Hemoglobin : 12.7 g/dL  Hematocrit : 40.1 %  Platelet Count - Automated : 199 K/uL  Mean Cell Volume : 93.7 fl  Mean Cell Hemoglobin : 29.7 pg  Mean Cell Hemoglobin Concentration : 31.7 gm/dL  Auto Neutrophil # : x  Auto Lymphocyte # : x  Auto Monocyte # : x  Auto Eosinophil # : x  Auto Basophil # : x  Auto Neutrophil % : x  Auto Lymphocyte % : x  Auto Monocyte % : x  Auto Eosinophil % : x  Auto Basophil % : x    05-17    150<H>  |  119<H>  |  118<H>  ----------------------------<  157<H>  4.6   |  23  |  1.45<H>    Ca    9.8      17 May 2022 05:29  Phos  2.8     05-17  Mg     2.0     05-17    TPro  4.3<L>  /  Alb  1.6<L>  /  TBili  0.7  /  DBili  x   /  AST  61<H>  /  ALT  70  /  AlkPhos  90  05-17        CAPILLARY BLOOD GLUCOSE            LIVER FUNCTIONS - ( 17 May 2022 05:29 )  Alb: 1.6 g/dL / Pro: 4.3 gm/dL / ALK PHOS: 90 U/L / ALT: 70 U/L / AST: 61 U/L / GGT: x                ICU Progress Note    HPI:    S:    Pt seen and examined  HD # 17  FULL CODE  PMHx colon CA, HTN, HLD, PPM, ventral hernia, BPH  Pt here for colon ca s/p resection ~15y ago here with mid-epigastric pain x 2d. Mild nausea, no vomiting/diarrhea/constipation. No fever/chills. No clear precipitating factor. Mild tenderness "but it also hurts when I'm not touching it". Claims he had a BM yesterday. Has not really been eating due to pain    Dx with SBO s/p ex-lap 5/5  ---> MEAGAN, SBR    Hypotensive this AM    5/17 AM: Hypotensive, lethargic. + black, tarry stools.     ROS: Unable to obtain 2/2 Pt status    Allergies    amoxicillin (Other (Severe))  penicillin (Unknown)    Intolerances    MEDICATIONS  (STANDING):    albumin human 25% IVPB 100 milliLiter(s) IV Intermittent <User Schedule>  chlorhexidine 4% Liquid 1 Application(s) Topical <User Schedule>  fat emulsion (Fish Oil and Plant Based) 20% Infusion 0.92 Gm/kG/Day (20.83 mL/Hr) IV Continuous <Continuous>  furosemide   Injectable 40 milliGRAM(s) IV Push daily  heparin   Injectable 5000 Unit(s) SubCutaneous every 12 hours  meropenem  IVPB 1000 milliGRAM(s) IV Intermittent every 12 hours  methylPREDNISolone sodium succinate Injectable 40 milliGRAM(s) IV Push every 12 hours  metoprolol tartrate Injectable 5 milliGRAM(s) IV Push every 6 hours  pantoprazole  Injectable 40 milliGRAM(s) IV Push daily  Parenteral Nutrition - Adult 1 Each (100 mL/Hr) TPN Continuous <Continuous>  phenylephrine    Infusion 0.5 MICROgram(s)/kG/Min (10.2 mL/Hr) IV Continuous <Continuous>  silver sulfADIAZINE 1% Cream 1 Application(s) Topical daily  sodium chloride 0.9%. 1000 milliLiter(s) (80 mL/Hr) IV Continuous <Continuous>    MEDICATIONS  (PRN):    ondansetron Injectable 4 milliGRAM(s) IV Push every 8 hours PRN Nausea and/or Vomiting  prochlorperazine   Injectable 10 milliGRAM(s) IntraMuscular every 8 hours PRN Nasuea/Vomiting    Drug Dosing Weight    Height (cm): 175.3 (01 May 2022 08:41)  Weight (kg): 54.4 (01 May 2022 08:41)  BMI (kg/m2): 17.7 (01 May 2022 08:41)  BSA (m2): 1.66 (01 May 2022 08:41):    PAST MEDICAL & SURGICAL HISTORY:  HTN (hypertension)    BPH (benign prostatic hyperplasia)    Ventral hernia    Colon cancer  h/o chemo 2004    Heart block    Pacemaker    HLD (hyperlipidemia)    H/O colectomy  2004    History of carpal tunnel surgery of left wrist    H/O hernia repair    History of total hip replacement, bilateral    History of appendectomy  ruptured    Cardiac pacemaker    H/O ventral hernia repair    FAMILY HISTORY:    ROS: See HPI; otherwise, all systems reviewed and negative.    O:    ICU Vital Signs Last 24 Hrs  T(C): 36.4 (17 May 2022 08:54), Max: 36.4 (17 May 2022 08:54)  T(F): 97.5 (17 May 2022 08:54), Max: 97.5 (17 May 2022 08:54)  HR: 102 (17 May 2022 08:30) (75 - 112)  BP: 88/54 (17 May 2022 08:30) (80/52 - 139/97)  BP(mean): 62 (17 May 2022 08:30) (60 - 108)  ABP: --  ABP(mean): --  RR: --  SpO2: 94% (17 May 2022 08:30) (80% - 100%)    I&O's Detail    16 May 2022 07:01  -  17 May 2022 07:00  --------------------------------------------------------  IN:    Fat Emulsion (Fish Oil &amp; Plant Based) 20% Infusion: 437 mL    IV PiggyBack: 50 mL    PPN (Peripheral Parenteral Nutrition): 3900 mL    sodium chloride 0.9%: 1440 mL  Total IN: 5827 mL    OUT:    Indwelling Catheter - Urethral (mL): 1400 mL  Total OUT: 1400 mL    Total NET: 4427 mL    PE:    Elderly M lying in bed  + emaciated and chronically ill appearing  + tachypneic  S1S2+  Diminished BS B/L  + ex-lap scar healing, no discharge from wound  Abd soft NTND  No leg swelling/edema noted  Awake but lethargic    LABS:    CBC Full  -  ( 17 May 2022 05:29 )  WBC Count : 13.13 K/uL  RBC Count : 4.28 M/uL  Hemoglobin : 12.7 g/dL  Hematocrit : 40.1 %  Platelet Count - Automated : 199 K/uL  Mean Cell Volume : 93.7 fl  Mean Cell Hemoglobin : 29.7 pg  Mean Cell Hemoglobin Concentration : 31.7 gm/dL  Auto Neutrophil # : x  Auto Lymphocyte # : x  Auto Monocyte # : x  Auto Eosinophil # : x  Auto Basophil # : x  Auto Neutrophil % : x  Auto Lymphocyte % : x  Auto Monocyte % : x  Auto Eosinophil % : x  Auto Basophil % : x    05-17    150<H>  |  119<H>  |  118<H>  ----------------------------<  157<H>  4.6   |  23  |  1.45<H>    Ca    9.8      17 May 2022 05:29  Phos  2.8     05-17  Mg     2.0     05-17    TPro  4.3<L>  /  Alb  1.6<L>  /  TBili  0.7  /  DBili  x   /  AST  61<H>  /  ALT  70  /  AlkPhos  90  05-17    CAPILLARY BLOOD GLUCOSE    LIVER FUNCTIONS - ( 17 May 2022 05:29 )  Alb: 1.6 g/dL / Pro: 4.3 gm/dL / ALK PHOS: 90 U/L / ALT: 70 U/L / AST: 61 U/L / GGT: x

## 2022-05-17 NOTE — PROGRESS NOTE ADULT - RS GEN PE MLT RESP DETAILS PC
no rales/no rhonchi/no wheezes/diminished breath sounds, L/diminished breath sounds, R/respirations labored

## 2022-05-17 NOTE — PROGRESS NOTE ADULT - ASSESSMENT
89M hx colon ca s/p resection in 2004 here with severe abdominal  pain. Symptoms started 2days ago and got worse.  Mild nausea, no vomiting/diarrhea/constipation. No fever/chills. No clear precipitating factor. Mild tenderness "but it also hurts when I'm not touching it". Claims he had a BM yesterday. Has not really been eating due to pain. The patient had ventral hernia repair in July 2021. CT abd shows high grade distal SBO.     PROBLEMS:    AC HYPOXAEMIC RESPIRATORY FAILURE  ASPIRATION PNEUMONIA  SBO  YAMINI on CKD III   Anemia likely hemodilutional + blood loss related to surgery. More likely hemodilutional.   Hypercalcmemia (multifactorial: dehydration + primary hyperparathyroidism)  HTN  BPH  SSS s/p PPM  toxic metabolic encephalopathy multifactorial   urinary retention and decreased urinary output  hypernatremia    Plan:    4lpm nc sat 97%  BIPAP 10/5-titrate fio2 40%-qhs  IV MEROPENEM  taper iv solu-medrols 20mg q12hr  PPN  iv hydration  SUPPORTIVE CARE  KEEP NEG BALANCE  d/w staff  DVT PROPHYLASIX  89M hx colon ca s/p resection in 2004 here with severe abdominal  pain. Symptoms started 2days ago and got worse.  Mild nausea, no vomiting/diarrhea/constipation. No fever/chills. No clear precipitating factor. Mild tenderness "but it also hurts when I'm not touching it". Claims he had a BM yesterday. Has not really been eating due to pain. The patient had ventral hernia repair in July 2021. CT abd shows high grade distal SBO.     PROBLEMS:    AC HYPOXAEMIC RESPIRATORY FAILURE  ASPIRATION PNEUMONIA  SBO  YAMINI on CKD III   Anemia likely hemodilutional + blood loss related to surgery. More likely hemodilutional.   Hypercalcmemia (multifactorial: dehydration + primary hyperparathyroidism)  HTN  BPH  SSS s/p PPM  toxic metabolic encephalopathy multifactorial   urinary retention and decreased urinary output  hypernatremia    Plan:    Titrate pressors-d/w intensivitis  4lpm nc sat 97%  BIPAP 10/5-titrate fio2 40%-qhs  IV MEROPENEM  IV solu-medrols 40mg q12hr  PPN  iv hydration  SUPPORTIVE CARE  KEEP NEG BALANCE  d/w staff  DVT PROPHYLASIX

## 2022-05-17 NOTE — PROGRESS NOTE ADULT - SUBJECTIVE AND OBJECTIVE BOX
Lethargic. Events of today noted. Passing melena now    On pressors, afeb    lungs-- clear but diminished BS bilat  Cor- RRR  Abd- few BS soft, tender R side  Ext- no edema

## 2022-05-17 NOTE — CONSULT NOTE ADULT - CONSULT REASON
SOB
Medical management
abdominal pain
Hypotension
CKD  Hypercalcemia
pneumonia
small bowel obstruction

## 2022-05-17 NOTE — CHART NOTE - NSCHARTNOTEFT_GEN_A_CORE
Clinical Nutrition PPN Follow Up Note    *89 yo male admitted with high grad distal SBO, YAMINI on CKD now s/p ex lap on 5/5.  hypercalcemia 2/2 dehydration and primary hyperparathyroidism. Pt started on PPN on 5/5 2/2 SBO and extended time NPO.    *5/9: As per surgery on 5/6: "s/p Expl lap, SBR for high grade SBO. Pt likely volume depleted with low urine output, elevated CR. Will bolus IVF to treat low UO."  Nephrology is following. Pt transferred to CICU for closer monitoring.   NGT pulled out on 5/5, pt refused replacement. Urine output improving. S/P SLP evaluation with recommendations regular consistency with thin liquids when medically feasible  *5/11: S/P RRT on 5/10 for hypoxia; encephalopathy 2/2 hypoxia; on venti-mask, pt now NPO.  CT chest shows consistent with b/l infiltrates.  worsening leukocytosis.  d/w Surgery PA, PN on hold for 5/10 and 5/11, lasix being given.    *of note, pt had urine output of 1300mL plus 1 undocumented urine episode with 4 episodes of diarrhea in the past 24hours.  (+1) Lt/Rt ankle, (+2) Lt/Rt arm edema documented.  ********pt was infused IVF at 80mL/hr on 5/8, 5/9, and 5/10 (~1900mL additional fluid volume).    *5/12: continues NPO on bipap until resp and mental status improve; s/p transferred to CICU. PPN on hold x 2 days but to re-start today as per Dr. Pete. I&O's not being appropriately/ accurately documented so unable to determine accurate fluid balance - will start total PN volume at 2000 mL and monitor/ adjust tomorrow based on labs/ I&O's. Strict and accurate I&O's are essential when pt is on PPN.   Now pending palliative care consult - highly rec'd to confirm goals of care regarding long-term nutrition support.    *5/14: remains NPO and on bipap. Is high risk for aspiration. Currently w/ post-op pneumonia. Day #9 of PPN -Discussed with Surgical PA regarding PICC line placement stated will order consult today. Will continue with PPN until line placed then will start TPN to meet > 80% of ENN.     *5/15: pt started on PPN on 5/5/22 due to SBO and extended time NPO, remains on PPN due to bipap.  Today (5/15/22), is day # 10 on PPN, rec'd PICC line placement for complete nutr needs to be met. c/w hypernatremia, being followed by nephrology, nephrology rec'd to limit lasix as pt with intravascular volume depletion and subcut edema due to third spacing.  continues on bipap and NPO.    *current status: remains NPO and day #12 of PPN - c/w hypernatremia and dehydration. IV lasix d/c on 5/15. IVF added yesterday (5/16). No palliative care consult at this time - pt remains full code. RD spoke to surgical PA about PICC line placement again - possible advancement to CLD today.    *labs reviewed; hypernatremia remains unchanged, will increase volume (total volume now 2500 mL). Also receiving IVF;  potassium high-normal, will decrease K in bag to 35 mEq.  Mg WNL - will add 5 mEq to bag.  Phos low-normal, will add 5 mEq to bag.  corrected Ca elevated.  bilirubin total WNL to continue trace elements.  triglycerides within limits to c/w lipids.  no POCT ordered or checked; per protocol - while pt is on PPN POCT should be checked q6hrs due to high risk for hyper/hypoglycemia.    05-17    150<H>  |  119<H>  |  118<H>  ----------------------------<  157<H>  4.6   |  23  |  1.45<H>    Ca    9.8      17 May 2022 05:29  Phos  2.8     05-17  Mg     2.0     05-17    TPro  4.3<L>  /  Alb  1.6<L>  /  TBili  0.7  /  DBili  x   /  AST  61<H>  /  ALT  70  /  AlkPhos  90  05-17      Lipid Panel: Date/Time: 05-13-22 @ 06:31  Triglycerides, Serum: 271    *pertinent meds: heparin, protonix, zofran, prochlorperazine, NaCl 0.9%    *I&O's Detail    16 May 2022 07:01  -  17 May 2022 07:00  --------------------------------------------------------  IN:    Fat Emulsion (Fish Oil &amp; Plant Based) 20% Infusion: 437 mL    IV PiggyBack: 50 mL    PPN (Peripheral Parenteral Nutrition): 3900 mL    sodium chloride 0.9%: 1440 mL  Total IN: 5827 mL    OUT:    Indwelling Catheter - Urethral (mL): 1400 mL  Total OUT: 1400 mL    Total NET: 4427 mL  * fluid status: positive net volume. Increased volume to 2500 mL 2/2 continued hypernatremia; IVF started yesterday - will monitor and adjust PN volume prn.    *BM (+) 5/16 x 3.  Pt not on bowel regimen.   *Mahesh Score of 13; stg 1 PI on sacrum documented. (+2) Lt arm edema documented.    *Malnutrition: Pt continues to meet criteria for severe malnutrition in acute on chronic illness r/t decreased ability to consume sufficient nutr 2/2 SBO on CA AEB meeting <50% of ENN x 7 days, severe muscle/ fat wasting    Estimated Needs: based on 55Kg (wt from 5/5, [facility] wt obtained by )  Calories: 3765-0868 Kcal (30-35 Kcal/Kg)  Protein:   g protein (1.5-2g/Kg)  Fluids:  9394-4653 mL (30-35mL/Kg)    Diet, NPO    Weight History: no significant changes x 2 days (admission weight may be inaccurate as there is a large discrepancy)   64 kg (5/16)  62.4Kg (5/15)  66 kg (5/8)  66 Kg (5/7)  Height (cm): 175.3 (05-01-22 @ 08:41)  Weight (kg): 54.4 (05-01-22 @ 08:41)  BMI (kg/m2): 17.7 (05-01-22 @ 08:41)  BSA (m2): 1.66 (05-01-22 @ 08:41)    PPN Recommendations: via peripheral venous line  Total Volume: 2500 mL  110 g  Amino Acids  150 g Dextrose  50 g Lipids 20%  97 mEq Sodium Chloride  0 mEq Sodium Acetate  10 mmol Sodium Phosphate  14 mEq Potassium Chloride  14 mEq Potassium Acetate  5 mmol Potassium Phosphate  0 mEq Calcium Gluconate    (Corrected Ca elevated - do NOT replete outside bag)  5 mEq Magnesium Sulfate  100 mg Thiamine  1 ml Trace   10 ml MVI    Total Calories:  1450  (Meets  88%  of  Estimated Energy needs  and   100%  Protein needs)     (osmolarity <900)      Recommendations:  1) Daily weights  2) Strict and accurate I & O's to closely monitor fluid balance  3) Daily lyte checks - including Mg and Phos  4) Weekly triglycerides/LFT checks  5) POCT q6 hours - maintain BG levels between 140- 180 mg/dL ***(Order POCT's as per protocol - pt at high risk for hyperglycemia when PPN infusing).  6) Confirm goals of care regarding long-term nutrition support   7) place central line to provide TPN and meet 100% ENN    *will monitor and adjust treatement plan prn*  Deana Glynn, MS, RDN, [phone]

## 2022-05-17 NOTE — PROVIDER CONTACT NOTE (CHANGE IN STATUS NOTIFICATION) - NAME OF MD/NP/PA/DO NOTIFIED:
RAPID RESPONSE, ICU CHI[facility] and Surgical CHIOMA Masterson
MD [facility] PA Robin Treadwell
Nicolás Lugo
CHIOMA Hunt

## 2022-05-17 NOTE — CONSULT NOTE ADULT - ASSESSMENT
A:    89yMale  HD #    Here for:    1.    This patient requires critical care for support of one or more vital organ systems with a high probability of imminent or life threatening deterioration in his/her condition    P:    Neuro: GCS 15. Monitor for delirium.  Continue to optimize pain control. Serial Neurologic assessments.    HEENT: No issues.    CV: Continue hemodynamic monitoring    Pulm: Pulmonary toilet.  Continue incentive spirometer.  Chest PT.  Encourage OOB to chair and ambulation. Nebs. f/u ABG, CXR.    GI/Nutrition: Cont diet, bowel regimen.    /Renal: Monitor UOP. Monitor BMP.  Replete Lytes as needed.    HEME- Chemical and mechanical DVT ppx. f/u CBC. f/u coags as needed.    ID:  Cont abx. f/u Cx's.    Lines/Tubes:     Endo: Maintain euglycemia.    Skin:  Cont skin care, pressure ulcer prevention.    Dispo: Cont critical care.    TOTAL CRITICAL CARE TIME:   (EXCLUSIVE of any non bundled procedures)    Note: This time spent INCLUDES time spent directly as this patient's bedside with evaluation, review of chart including review of laboratory and imaging studies, interpretation of vital signs and cardiac output measurements, any necessary ventilator management, and time spent discussing plan of care with patient and family, including goals of care discussion.   A:    89M  HD # 18  FULL CODE    Here for:    1. Septic shock  2. SBO s/p ex-lap  3. PNA, B/L  4. Aspiration PNA  5. Protein calorie malnutrition  6. Lactic acidosis  7. YAMINI  8. CKD    This patient requires critical care for support of one or more vital organ systems with a high probability of imminent or life threatening deterioration in his/her condition    P:    Dispo: Accept to critical care. Pressors, IVF, f/u end points, POCUS. Goals of care.     TOTAL CRITICAL CARE TIME:  45 minutes (EXCLUSIVE of any non bundled procedures)    Note: This time spent INCLUDES time spent directly as this patient's bedside with evaluation, review of chart including review of laboratory and imaging studies, interpretation of vital signs and cardiac output measurements, any necessary ventilator management, and time spent discussing plan of care with patient and family, including goals of care discussion.   A:    89M  HD # 18  FULL CODE    Here for:    1. Septic shock  2. SBO s/p ex-lap  3. PNA, B/L  4. Aspiration PNA  5. Protein calorie malnutrition  6. Lactic acidosis  7. YAMINI  8. CKD  9. Acute hypoxic resp failure  10. Pleural effusions  11. GIB    This patient requires critical care for support of one or more vital organ systems with a high probability of imminent or life threatening deterioration in his/her condition    P:    Prolonged hospital stay for SBO s/p ex-lap  Complicated by PNA, acute hypoxic resp failure requiring NIPPV  Hypotension this AM prompting ICU consult    Avoid deleriogenic meds  HD monitoring, start vasopressors phenylpehrine via PIV, goal MAP > 65. Rpt TTE, last done ~ 14 days ago shows largely pLVfxn (~55%), some restricted systolic compliance. Per cardiology note (Dr Ronquillo 5/11, Dx HFpEF). d/c all anti hypertensives  Oxygen therapy as needed; may benefit from NIPPV; rpt CT chest to evaluate effusions, may benefit from drainage  NPO for now, on parenteral nutrition; diet plan per surgery. Midline wound healing well, no drainage  Merrem for sepsis, ID involved; most likely source lungs given CT chest 6 days ago, serial CXR's; will obtain CT chest w/o IVC today to better elucidate, send BC  VTE ppx ICD only given black, tarry stools; PUD ppx  YAMINI on CKD; f/u UOP, avoid renal toxic meds  Hgb ~ 12, Plt 176; black tarry stools, maybe GIB; ?some bowel ischemia 2/2 hypoperfusion with shock state, will trend Hgb, change PPI to BID  Given shock state d/c lasix, given albumin as volume support/colloid to protect intravascular pressures  Weaning steroids for pulmonary support  d/c lopressor for shock state  May require central vascular access; for now, PIV, mullen    Dispo: Accept to critical care. Pressors, IVF, f/u end points, TTE, CT chest. ? thoracentesis, central lines. Ongoing goals of care discussion.    Discussed with pt wife and daughter at bedside, all questions answered.  Discussed with Dr Cruz.     TOTAL CRITICAL CARE TIME:  108 minutes (EXCLUSIVE of any non bundled procedures)    Note: This time spent INCLUDES time spent directly as this patient's bedside with evaluation, review of chart including review of laboratory and imaging studies, interpretation of vital signs and cardiac output measurements, any necessary ventilator management, and time spent discussing plan of care with patient and family, including goals of care discussion.

## 2022-05-17 NOTE — PROVIDER CONTACT NOTE (OTHER) - SITUATION
notified Dr Krishnan's office of admission
Dr Pete here rounding on patients, made MD aware that RN noted swelling to left arm. IV's paused for time being as PA was coming to evaluate.
Oxygen demands have increased this am, Patient saturation on 6L NC now is 84%
patient found with NGT coiled in mouth.
Dr Ronquillo is aware of patient admission
patient pulled out NGT.
Patient Bladder scan 590cc
removed Kc at 10 am pt has not voided since Kc removal. bladder scan 95ml
no

## 2022-05-17 NOTE — PROGRESS NOTE ADULT - SUBJECTIVE AND OBJECTIVE BOX
CC: Patient is a 89y old  Male who presents with a chief complaint of abdominal pain   89M hx colon ca s/p resection ~15y ago here with mid-epigastric pain x 2d. Mild nausea, no vomiting/diarrhea/constipation. No fever/chills. No clear precipitating factor. Mild tenderness "but it also hurts when I'm not touching it". Claims he had a BM yesterday. Has not really been eating due to pain. The patient had ventral hernia repair in 2021.     Subjective:   : Abdominal Pain improved. No nausea or vomiting. Passing a little flatus., Denies fever, chills, dizziness, chest pain, SOB  5/3: Abdominal pain similar to yesterday NGT failure/removal overnight. No nausea. Passing a little flatus. Still NPO and on IVF.   : Abdominal pain unchanged; Off NGT; Repeat CT ->worsening SBO and ascites. Afebrile and hemodynamically stable.   : more distended; more pain and more nausea. Planned for ex lap today.   : s/p ex lap. Post op hypotension and rise in Cr. IVF boluses ordered to maintain MAP. Transferred to CICU for closer monitoring.   : improved BP. Improved Cr. Improved Urine output. Pain controlled. No nausea. Intermittent confusion but easily re-oriented.   : in restraints, not in acute distress   : more alert  5/10 -seen in the morning,  alert,  knows his name, states he wants to sleep some more. denies abdo pain. ROS limited due to underlying dementia/delirium but he does not appear to be in acute distress    - seen this morning - shortness of breath, on NRM - gave lasix upgraded to CICU for bipap   - this morning tolerating bipap and looking more comfortable   - on bipap, titrating fio2, awake, alert, ROS limited but does not appear to be in distress, is NPO on PPN ; worsening leukocytosis    - seen in AM - on bipap, comfortable, appears dry, awake, alert  5/15 - looks better today, interactive, denies cp palps sob able to state his name   : + [place], visibly dehydrated with dry mucosals/skin, not in distress, FQ to gravity  : hypotensive, minimally responsive with open eyes, dark stool, hypoxia  ROS UTO due to above    T(C): 36.4 (22 @ 08:54), Max: 36.4 (22 @ 08:54)  HR: 97 (22 @ 15:30) (75 - 112)  BP: 98/53 (22 @ 14:30) (69/49 - 139/97)  RR: 26 (22 @ 15:30) (20 - 32)  SpO2: 97% (22 @ 15:30) (80% - 100%)    22 @ 07:01  -  22 @ 07:00  --------------------------------------------------------  IN: 5827 mL / OUT: 1400 mL / NET: 4427 mL       Daily Weight in k (17 May 2022 06:18)                        10.8   19.96 )-----------( 176      ( 17 May 2022 11:25 )             33.5     17 May 2022 15:46    149    |  120    |  138    ----------------------------<  121    4.8     |  23     |  1.91     Ca    10.1       17 May 2022 15:46  Phos  2.8       17 May 2022 05:29  Mg     2.0       17 May 2022 05:29    TPro  4.3    /  Alb  1.6    /  TBili  0.7    /  DBili  x      /  AST  61     /  ALT  70     /  AlkPhos  90     17 May 2022 05:29    LIVER FUNCTIONS - ( 17 May 2022 05:29 )  Alb: 1.6 g/dL / Pro: 4.3 gm/dL / ALK PHOS: 90 U/L / ALT: 70 U/L / AST: 61 U/L / GGT: x           albumin human 25% IVPB 100 milliLiter(s) IV Intermittent <User Schedule>  albumin human 25% IVPB 50 milliLiter(s) IV Intermittent every 6 hours  chlorhexidine 4% Liquid 1 Application(s) Topical <User Schedule>  chlorhexidine 4% Liquid 1 Application(s) Topical <User Schedule>  fat emulsion (Fish Oil and Plant Based) 20% Infusion 0.92 Gm/kG/Day IV Continuous <Continuous>  furosemide   Injectable 20 milliGRAM(s) IV Push every 6 hours  heparin   Injectable 5000 Unit(s) SubCutaneous every 8 hours  meropenem  IVPB 1000 milliGRAM(s) IV Intermittent every 12 hours  methylPREDNISolone sodium succinate Injectable 40 milliGRAM(s) IV Push every 12 hours  ondansetron Injectable 4 milliGRAM(s) IV Push every 8 hours PRN  pantoprazole  Injectable 40 milliGRAM(s) IV Push every 12 hours  Parenteral Nutrition - Adult 1 Each TPN Continuous <Continuous>  Parenteral Nutrition - Adult 1 Each TPN Continuous <Continuous>  phenylephrine    Infusion 0.5 MICROgram(s)/kG/Min IV Continuous <Continuous>  prochlorperazine   Injectable 10 milliGRAM(s) IntraMuscular every 8 hours PRN  silver sulfADIAZINE 1% Cream 1 Application(s) Topical daily  sodium chloride 0.9% lock flush 10 milliLiter(s) IV Push every 1 hour PRN  sodium chloride 0.9%. 1000 milliLiter(s) IV Continuous <Continuous>    Constitutional: alert minimally responsive male with mild respiratory distress  HEENT:  EOMI, PERRLA, Dry mucosals  Neck: supple  Back: no tenderness  Respiratory: BL with diminished BS with crackles  Cardiovascular: S1, S2 regular, I/VI JONY  Abdomen: soft; + bowel sounds; Midline surgical incision site with soft surgical dressing; clean and dry  : Kc to gravity  Musculoskeletal: no muscle tenderness, no joint swelling or tenderness  Extremities: no pedal edema  Neurological: Alert but not following commands, moving all extremities, no focal deficits  Skin: no rash

## 2022-05-17 NOTE — PROGRESS NOTE ADULT - SUBJECTIVE AND OBJECTIVE BOX
HPI: Patient transferred to the CCU for hypotension and respiratory failure    This admission patient presented with a SBP.  S/P Ex LAP on 5/5.  Post Op developed YAMINI, aspiration pneumonia, and now with hypotension likely from Septic Shock.  He has hypoxic respiratory failure from large B/L pleural effusions.           PAST MEDICAL & SURGICAL HISTORY:  HTN (hypertension)      BPH (benign prostatic hyperplasia)      Ventral hernia      Colon cancer  h/o chemo 2004      Heart block      Pacemaker      HLD (hyperlipidemia)      H/O colectomy  2004      History of carpal tunnel surgery of left wrist      H/O hernia repair      History of total hip replacement, bilateral      History of appendectomy  ruptured      Cardiac pacemaker      H/O ventral hernia repair          FAMILY HISTORY:      Social Hx:    Allergies    amoxicillin (Other (Severe))  penicillin (Unknown)    Intolerances            ICU Vital Signs Last 24 Hrs  T(C): 36.4 (17 May 2022 08:54), Max: 36.4 (17 May 2022 08:54)  T(F): 97.5 (17 May 2022 08:54), Max: 97.5 (17 May 2022 08:54)  HR: 100 (17 May 2022 12:15) (75 - 112)  BP: 118/65 (17 May 2022 12:15) (69/49 - 139/97)  BP(mean): 77 (17 May 2022 12:15) (52 - 108)  ABP: --  ABP(mean): --  RR: 31 (17 May 2022 12:15) (20 - 32)  SpO2: 99% (17 May 2022 12:15) (80% - 100%)          I&O's Summary    16 May 2022 07:01  -  17 May 2022 07:00  --------------------------------------------------------  IN: 5827 mL / OUT: 1400 mL / NET: 4427 mL                              10.8   19.96 )-----------( 176      ( 17 May 2022 11:25 )             33.5       05-17    148<H>  |  121<H>  |  128<H>  ----------------------------<  147<H>  4.8   |  18<L>  |  1.62<H>    Ca    10.2<H>      17 May 2022 11:25  Phos  2.8     05-17  Mg     2.0     05-17    TPro  4.3<L>  /  Alb  1.6<L>  /  TBili  0.7  /  DBili  x   /  AST  61<H>  /  ALT  70  /  AlkPhos  90  05-17                    MEDICATIONS  (STANDING):  albumin human 25% IVPB 100 milliLiter(s) IV Intermittent <User Schedule>  chlorhexidine 4% Liquid 1 Application(s) Topical <User Schedule>  fat emulsion (Fish Oil and Plant Based) 20% Infusion 0.92 Gm/kG/Day (20.83 mL/Hr) IV Continuous <Continuous>  fat emulsion (Fish Oil and Plant Based) 20% Infusion 0.92 Gm/kG/Day (20.83 mL/Hr) IV Continuous <Continuous>  meropenem  IVPB 1000 milliGRAM(s) IV Intermittent every 12 hours  methylPREDNISolone sodium succinate Injectable 40 milliGRAM(s) IV Push every 12 hours  pantoprazole  Injectable 40 milliGRAM(s) IV Push every 12 hours  Parenteral Nutrition - Adult 1 Each (104 mL/Hr) TPN Continuous <Continuous>  Parenteral Nutrition - Adult 1 Each (100 mL/Hr) TPN Continuous <Continuous>  phenylephrine    Infusion 0.5 MICROgram(s)/kG/Min (10.2 mL/Hr) IV Continuous <Continuous>  silver sulfADIAZINE 1% Cream 1 Application(s) Topical daily  sodium chloride 0.9%. 1000 milliLiter(s) (80 mL/Hr) IV Continuous <Continuous>    MEDICATIONS  (PRN):  ondansetron Injectable 4 milliGRAM(s) IV Push every 8 hours PRN Nausea and/or Vomiting  prochlorperazine   Injectable 10 milliGRAM(s) IntraMuscular every 8 hours PRN Nasuea/Vomiting      DVT Prophylaxis: [facility]    Advanced Directives:  Discussed with:    Visit Information: 90 min    ** Time is exclusive of billed procedures and/or teaching and/or routine family updates.

## 2022-05-17 NOTE — CONSULT NOTE ADULT - REASON FOR ADMISSION
abdominal pain
Small bowel obstruction
abdominal pain

## 2022-05-17 NOTE — PROVIDER CONTACT NOTE (CHANGE IN STATUS NOTIFICATION) - ACTION/TREATMENT ORDERED:
Continue to monitor Pt, no new orders.
LR bolus, albumin, phenelephrine infusion ordered. Critical care consult. Transferred to CCU.
 ml bolus will be administered.
Gave patient STAT IV lasix, blood gases drawn, EKG same as previous as per surgical Zelalem BEDOLLA. Took patient down to CT to rule out any PNA. Patient O2 sat stable on Non-rebreather sating 93-95% RR 20. Will continue to monitor patient.

## 2022-05-17 NOTE — PROVIDER CONTACT NOTE (OTHER) - DATE AND TIME:
17-May-2022 11:17
02-May-2022 05:50
05-May-2022 12:17
03-May-2022 02:00
08-May-2022 17:45
09-May-2022 21:30
10-May-2022 20:05
11-May-2022 06:42

## 2022-05-17 NOTE — CHART NOTE - NSCHARTNOTEFT_GEN_A_CORE
Discussed Pt condition, prognosis    Pt in MSOF/MODS: resp failure, shock requiring pressors, YAMINI  He is on vasopressor therapy for shock  He is on a 100% NRB for respiratory support    I discussed these are life supporting measures    We discussed labs, imaging and his condition; poor prognosis  I suspect B/L PNA, pulmonary edema, sepsis   in setting of advanced age, significant comorbidities, long hospitalization, malnutrition    I discussed with wife Ana and daughter  I explained that despite our best efforts, he may not make it through this illness    We discussed GOC  Ana reiterates DNR/DNI  While Ana still desires treatment, she does NOT wish for any further invasive procedures beyond those already performed (CVC, ankita, B/L pigtails for effusion drainage)    Ana does not want his to suffer any further  We agreed to no further invasive measures  We agreed to no dialysis  We agreed to no additional vasopressors beyond what he is already on (phenylephrine)    I have updated MOLST    B/L chest tubes done; see procedure note for details    Continue critical care    Dr Cruz updated    Add'l CC Time: 45 minutes, NOT INCLUDING procedures

## 2022-05-17 NOTE — PROGRESS NOTE ADULT - ASSESSMENT
Sepsis, s/p post op pneumonia, s/p SBR for obstruction. Currently septic requiring pressors, likely secondary to pneumonia. Large pleural effusions bilat on CT scan, fluid collection R abdomen. Discussed with family. Will consult IR re percutaneous drainage of effusions and possible abd collection. Official CT reading is pending.

## 2022-05-18 NOTE — CHART NOTE - NSCHARTNOTESELECT_GEN_ALL_CORE
Dietitian F/U w/ PPN
Dietitian F/U w/ PPN recs
Dietitian PPN f/u note
Event Note
Event Note
F/U GOC/Event Note
ICU RRT Note/Event Note
Dietitian F/U w/ PPN
Dietitian F/U w/ PPN recs
Dietitian F/U w/ PPN recs
Dietitian Follow-Up
Event Note

## 2022-05-18 NOTE — PROGRESS NOTE ADULT - SUBJECTIVE AND OBJECTIVE BOX
Subjective:    pat on 100% NRB & unresponsive, family @ bedside seen @ 930am, pat poor prognosis family does not wants any aggressive measures-pat  @ 12:05mid noon.    Home Medications:  Advil Dual Action With Acetaminophen 250 mg-125 mg oral tablet: 2 tab(s) orally once a day, As Needed (01 May 2022 21:57)  alfuzosin 10 mg oral tablet, extended release: 1 tab(s) orally once a day (01 May 2022 21:57)  amLODIPine 5 mg oral tablet: 1 tab(s) orally once a day (01 May 2022 21:57)  azelastine nasal: nasal once a day (01 May 2022 21:57)  hydroCHLOROthiazide 25 mg oral tablet: 1 tab(s) orally once a day (01 May 2022 21:57)  losartan 25 mg oral tablet: 1/2  tab(s) orally once a day (01 May 2022 21:57)  metoprolol succinate 25 mg oral tablet, extended release: 1 tab(s) orally once a day (01 May 2022 21:57)    MEDICATIONS  (STANDING):  morphine  - Injectable 2 milliGRAM(s) IV Push every 1 hour  Parenteral Nutrition - Adult 1 Each (104 mL/Hr) TPN Continuous <Continuous>  silver sulfADIAZINE 1% Cream 1 Application(s) Topical daily    MEDICATIONS  (PRN):      Allergies    amoxicillin (Other (Severe))  penicillin (Unknown)    Intolerances        Vital Signs Last 24 Hrs  T(C): 36.5 (18 May 2022 08:40), Max: 36.6 (18 May 2022 05:00)  T(F): 97.7 (18 May 2022 08:40), Max: 97.8 (18 May 2022 05:00)  HR: 55 (18 May 2022 12:00) (55 - 101)  BP: --  BP(mean): --  RR: 0 (18 May 2022 12:00) (0 - 28)  SpO2: 91% (18 May 2022 12:00) (91% - 100%)      PHYSICAL EXAMINATION:    NECK:  Supple. No lymphadenopathy. Jugular venous pressure not elevated. Carotids equal.   HEART:   The cardiac impulse has a normal quality. Reg., Nl S1 and S2.  There are no murmurs, rubs or gallops noted  CHEST:  Chest is clear to auscultation. Normal respiratory effort.  ABDOMEN:  Soft and nontender.   EXTREMITIES:  There is no edema.       LABS:                        9.1    23.97 )-----------( 140      ( 18 May 2022 06:00 )             29.5     05-18    146<H>  |  117<H>  |  148<H>  ----------------------------<  161<H>  5.8<H>   |  24  |  2.79<H>    Ca    10.2<H>      18 May 2022 06:00  Phos  6.8       Mg     2.0         TPro  4.4<L>  /  Alb  1.9<L>  /  TBili  0.4  /  DBili  x   /  AST  21  /  ALT  35  /  AlkPhos  69

## 2022-05-18 NOTE — PROGRESS NOTE ADULT - SUBJECTIVE AND OBJECTIVE BOX
Date of service: 05-18-22 @ 10:33    Events noted: hypotensive and hypoxic  Transferred to ICU  Noted with abdominal wound fecal discharge   No fever    ROS: no fever or chills; poorly verbal    MEDICATIONS  (STANDING):  morphine  - Injectable 2 milliGRAM(s) IV Push every 1 hour  Parenteral Nutrition - Adult 1 Each (104 mL/Hr) TPN Continuous <Continuous>  silver sulfADIAZINE 1% Cream 1 Application(s) Topical daily    Vital Signs Last 24 Hrs  T(C): 36.5 (18 May 2022 08:40), Max: 36.6 (18 May 2022 05:00)  T(F): 97.7 (18 May 2022 08:40), Max: 97.8 (18 May 2022 05:00)  HR: 86 (18 May 2022 10:00) (79 - 105)  BP: 98/53 (17 May 2022 14:30) (78/47 - 118/65)  BP(mean): 64 (17 May 2022 14:30) (54 - 77)  RR: 22 (18 May 2022 10:00) (20 - 32)  SpO2: 100% (18 May 2022 09:00) (87% - 100%)     Physical exam:    Constitutional:  frail looking male  HEENT: NC/AT  Neck: supple; thyroid not palpable  Back: no tenderness  Respiratory: respiratory effort normal; crackles at bases  Cardiovascular: S1S2 regular, no murmurs  Abdomen: soft, not tender, not distended, positive BS; midline incision with fecal discharge  Genitourinary: no suprapubic tenderness  Musculoskeletal: no muscle tenderness, no joint swelling or tenderness  Extremities: no pedal edema  Neurological/ Psychiatric: obtunded, moving all extremities  Skin: no rashes; no palpable lesions    Labs: reviewed                        12.7   13.13 )-----------( 199      ( 17 May 2022 05:29 )             40.1     05-17    150<H>  |  119<H>  |  118<H>  ----------------------------<  157<H>  4.6   |  23  |  1.45<H>    Ca    9.8      17 May 2022 05:29  Phos  2.8     05-17  Mg     2.0     05-17    TPro  4.3<L>  /  Alb  1.6<L>  /  TBili  0.7  /  DBili  x   /  AST  61<H>  /  ALT  70  /  AlkPhos  90  05-17                        10.7   24.56 )-----------( 189      ( 16 May 2022 05:32 )             32.9     05-16    149<H>  |  117<H>  |  87<H>  ----------------------------<  156<H>  4.3   |  29  |  1.12    Ca    10.2<H>      16 May 2022 05:32  Phos  3.0     05-16  Mg     2.1     05-16    TPro  4.6<L>  /  Alb  1.5<L>  /  TBili  0.3  /  DBili  x   /  AST  17  /  ALT  19  /  AlkPhos  80  05-16                                   11.4   17.44 )-----------( 116      ( 11 May 2022 07:03 )             34.4     05-11    141  |  114<H>  |  49<H>  ----------------------------<  135<H>  4.1   |  18<L>  |  1.39<H>    Ca    9.6      11 May 2022 05:40  Phos  3.4     05-11  Mg     2.1     05-11    TPro  5.4<L>  /  Alb  1.7<L>  /  TBili  0.4  /  DBili  x   /  AST  35  /  ALT  25  /  AlkPhos  104  05-11    Cultures:       Culture - Blood (collected 07 May 2022 09:46)  Source: .Blood None  Final Report (12 May 2022 17:00):    No Growth Final    Culture - Blood (collected 07 May 2022 09:40)  Source: .Blood None  Final Report (12 May 2022 17:00):    No Growth Final    Radiology: all available radiological tests reviewed    < from: CT Abdomen and Pelvis w/ Oral Cont (05.04.22 @ 14:53) >  Worsening small bowel obstruction with slight increase in dilatation. Increasing ascites.  Developing small bilateral pleural effusions.  < end of copied text >    < from: CT Chest No Cont (05.11.22 @ 08:37) >  Interval development of bilateral patchy opacities in the lungs most   pronounced in the upper lobes raising concern for infection. Interval   developmentof atelectasis of the right lower lobe and increased partial   atelectasis left lower lobe. Moderate bilateral pleural effusions have increased.  < end of copied text >      Advanced directives addressed: full resuscitation

## 2022-05-18 NOTE — PROGRESS NOTE ADULT - NUTRITIONAL ASSESSMENT
This patient has been assessed with a concern for Malnutrition and has been determined to have a diagnosis/diagnoses of Severe protein-calorie malnutrition and Underweight (BMI < 19).    This patient is being managed with:   Parenteral Nutrition - Adult-  Entered: May  5 2022 10:00PM    Diet NPO-  Entered: May  1 2022  1:34PM    
This patient has been assessed with a concern for Malnutrition and has been determined to have a diagnosis/diagnoses of Severe protein-calorie malnutrition and Underweight (BMI < 19).    This patient is being managed with:   Parenteral Nutrition - Adult-  Entered: May  6 2022  5:00PM    fat emulsion (Fish Oil and Plant Based) 20% Infusion-[Known as SMOFLIPID 20% Infusion]  50.05 Gram(s) in IV Solution 250.25 milliLiter(s) infuse at 20.9 mL/Hr  Dose Rate: 0.92 Gm/kG/Day Infuse Over: 12 Hours; Stop After 12 Hours  Administration Instructions: Use 1.2 micron in-line filter  Entered: May  6 2022  5:00PM    Parenteral Nutrition - Adult-  Entered: May  5 2022 10:00PM    Diet NPO-  Entered: May  5 2022  7:40PM    
This patient has been assessed with a concern for Malnutrition and has been determined to have a diagnosis/diagnoses of Severe protein-calorie malnutrition and Underweight (BMI < 19).    This patient is being managed with:   Parenteral Nutrition - Adult-  Entered: May  7 2022 10:00PM    fat emulsion (Fish Oil and Plant Based) 20% Infusion-[Known as SMOFLIPID 20% Infusion]  50 Gram(s) in IV Solution 250 milliLiter(s) infuse at 20.83 mL/Hr  Dose Rate: 0.92 Gm/kG/Day Infuse Over: 12 Hours; Stop After 12 Hours  Administration Instructions: Use 1.2 micron in-line filter  Entered: May  7 2022 10:00PM    Parenteral Nutrition - Adult-  Entered: May  6 2022 10:00PM    fat emulsion (Fish Oil and Plant Based) 20% Infusion-[Known as SMOFLIPID 20% Infusion]  50 Gram(s) in IV Solution 250.25 milliLiter(s) infuse at 20.9 mL/Hr  Dose Rate: 0.92 Gm/kG/Day Infuse Over: 12 Hours; Stop After 12 Hours  Administration Instructions: Use 1.2 micron in-line filter  Entered: May  6 2022 10:00PM    Diet NPO-  Entered: May  5 2022  7:40PM    
This patient has been assessed with a concern for Malnutrition and has been determined to have a diagnosis/diagnoses of Severe protein-calorie malnutrition and Underweight (BMI < 19).    This patient is being managed with:   Parenteral Nutrition - Adult-  Entered: May  6 2022 10:00PM    fat emulsion (Fish Oil and Plant Based) 20% Infusion-[Known as SMOFLIPID 20% Infusion]  50 Gram(s) in IV Solution 250.25 milliLiter(s) infuse at 20.9 mL/Hr  Dose Rate: 0.92 Gm/kG/Day Infuse Over: 12 Hours; Stop After 12 Hours  Administration Instructions: Use 1.2 micron in-line filter  Entered: May  6 2022 10:00PM    Parenteral Nutrition - Adult-  Entered: May  5 2022 10:00PM    Diet NPO-  Entered: May  5 2022  7:40PM    
This patient has been assessed with a concern for Malnutrition and has been determined to have a diagnosis/diagnoses of Severe protein-calorie malnutrition and Underweight (BMI < 19).    This patient is being managed with:   Parenteral Nutrition - Adult-  Entered: May  7 2022 10:00PM    fat emulsion (Fish Oil and Plant Based) 20% Infusion-[Known as SMOFLIPID 20% Infusion]  50 Gram(s) in IV Solution 250 milliLiter(s) infuse at 20.83 mL/Hr  Dose Rate: 0.92 Gm/kG/Day Infuse Over: 12 Hours; Stop After 12 Hours  Administration Instructions: Use 1.2 micron in-line filter  Entered: May  7 2022 10:00PM    Parenteral Nutrition - Adult-  Entered: May  6 2022 10:00PM    fat emulsion (Fish Oil and Plant Based) 20% Infusion-[Known as SMOFLIPID 20% Infusion]  50 Gram(s) in IV Solution 250.25 milliLiter(s) infuse at 20.9 mL/Hr  Dose Rate: 0.92 Gm/kG/Day Infuse Over: 12 Hours; Stop After 12 Hours  Administration Instructions: Use 1.2 micron in-line filter  Entered: May  6 2022 10:00PM    Diet NPO-  Entered: May  5 2022  7:40PM    
This patient has been assessed with a concern for Malnutrition and has been determined to have a diagnosis/diagnoses of Severe protein-calorie malnutrition and Underweight (BMI < 19).    This patient is being managed with:   Parenteral Nutrition - Adult-  Entered: May  6 2022 10:00PM    fat emulsion (Fish Oil and Plant Based) 20% Infusion-[Known as SMOFLIPID 20% Infusion]  50 Gram(s) in IV Solution 250.25 milliLiter(s) infuse at 20.9 mL/Hr  Dose Rate: 0.92 Gm/kG/Day Infuse Over: 12 Hours; Stop After 12 Hours  Administration Instructions: Use 1.2 micron in-line filter  Entered: May  6 2022 10:00PM    Parenteral Nutrition - Adult-  Entered: May  5 2022 10:00PM    Diet NPO-  Entered: May  5 2022  7:40PM    
This patient has been assessed with a concern for Malnutrition and has been determined to have a diagnosis/diagnoses of Severe protein-calorie malnutrition and Underweight (BMI < 19).    This patient is being managed with:   Parenteral Nutrition - Adult-  Entered: May  7 2022 10:00PM    fat emulsion (Fish Oil and Plant Based) 20% Infusion-[Known as SMOFLIPID 20% Infusion]  50 Gram(s) in IV Solution 250 milliLiter(s) infuse at 20.83 mL/Hr  Dose Rate: 0.92 Gm/kG/Day Infuse Over: 12 Hours; Stop After 12 Hours  Administration Instructions: Use 1.2 micron in-line filter  Entered: May  7 2022 10:00PM    Parenteral Nutrition - Adult-  Entered: May  6 2022 10:00PM    fat emulsion (Fish Oil and Plant Based) 20% Infusion-[Known as SMOFLIPID 20% Infusion]  50 Gram(s) in IV Solution 250.25 milliLiter(s) infuse at 20.9 mL/Hr  Dose Rate: 0.92 Gm/kG/Day Infuse Over: 12 Hours; Stop After 12 Hours  Administration Instructions: Use 1.2 micron in-line filter  Entered: May  6 2022 10:00PM    Diet NPO-  Entered: May  5 2022  7:40PM    
This patient has been assessed with a concern for Malnutrition and has been determined to have a diagnosis/diagnoses of Severe protein-calorie malnutrition and Underweight (BMI < 19).    This patient is being managed with:   Parenteral Nutrition - Adult-  Entered: May  7 2022 10:00PM    fat emulsion (Fish Oil and Plant Based) 20% Infusion-[Known as SMOFLIPID 20% Infusion]  50 Gram(s) in IV Solution 250 milliLiter(s) infuse at 20.83 mL/Hr  Dose Rate: 0.92 Gm/kG/Day Infuse Over: 12 Hours; Stop After 12 Hours  Administration Instructions: Use 1.2 micron in-line filter  Entered: May  7 2022 10:00PM    Parenteral Nutrition - Adult-  Entered: May  6 2022 10:00PM    fat emulsion (Fish Oil and Plant Based) 20% Infusion-[Known as SMOFLIPID 20% Infusion]  50 Gram(s) in IV Solution 250.25 milliLiter(s) infuse at 20.9 mL/Hr  Dose Rate: 0.92 Gm/kG/Day Infuse Over: 12 Hours; Stop After 12 Hours  Administration Instructions: Use 1.2 micron in-line filter  Entered: May  6 2022 10:00PM    Diet NPO-  Entered: May  5 2022  7:40PM    
This patient has been assessed with a concern for Malnutrition and has been determined to have a diagnosis/diagnoses of Severe protein-calorie malnutrition and Underweight (BMI < 19).    This patient is being managed with:   Parenteral Nutrition - Adult-  Entered: May  9 2022 10:00PM    fat emulsion (Fish Oil and Plant Based) 20% Infusion-[Known as SMOFLIPID 20% Infusion]  50 Gram(s) in IV Solution 250 milliLiter(s) infuse at 20.83 mL/Hr  Dose Rate: 0.92 Gm/kG/Day Infuse Over: 12 Hours; Stop After 12 Hours  Administration Instructions: Use 1.2 micron in-line filter  Entered: May  9 2022 10:00PM    Parenteral Nutrition - Adult-  Entered: May  8 2022 10:00PM    Diet Clear Liquid-  Entered: May  8 2022  1:00PM    
This patient has been assessed with a concern for Malnutrition and has been determined to have a diagnosis/diagnoses of Severe protein-calorie malnutrition and Underweight (BMI < 19).    This patient is being managed with:   Diet NPO-  Entered: May 11 2022  2:13PM    Parenteral Nutrition - Adult-  Entered: May 10 2022 10:00PM    fat emulsion (Fish Oil and Plant Based) 20% Infusion-[Known as SMOFLIPID 20% Infusion]  50 Gram(s) in IV Solution 250 milliLiter(s) infuse at 20.83 mL/Hr  Dose Rate: 0.92 Gm/kG/Day Infuse Over: 12 Hours; Stop After 12 Hours  Administration Instructions: Use 1.2 micron in-line filter  Entered: May 10 2022 10:00PM    
This patient has been assessed with a concern for Malnutrition and has been determined to have a diagnosis/diagnoses of Severe protein-calorie malnutrition and Underweight (BMI < 19).    This patient is being managed with:   Parenteral Nutrition - Adult-  Entered: May  6 2022 10:00PM    fat emulsion (Fish Oil and Plant Based) 20% Infusion-[Known as SMOFLIPID 20% Infusion]  50 Gram(s) in IV Solution 250.25 milliLiter(s) infuse at 20.9 mL/Hr  Dose Rate: 0.92 Gm/kG/Day Infuse Over: 12 Hours; Stop After 12 Hours  Administration Instructions: Use 1.2 micron in-line filter  Entered: May  6 2022 10:00PM    Parenteral Nutrition - Adult-  Entered: May  5 2022 10:00PM    Diet NPO-  Entered: May  5 2022  7:40PM    
This patient has been assessed with a concern for Malnutrition and has been determined to have a diagnosis/diagnoses of Severe protein-calorie malnutrition and Underweight (BMI < 19).    This patient is being managed with:   Parenteral Nutrition - Adult-  Entered: May  7 2022 10:00PM    fat emulsion (Fish Oil and Plant Based) 20% Infusion-[Known as SMOFLIPID 20% Infusion]  50 Gram(s) in IV Solution 250 milliLiter(s) infuse at 20.83 mL/Hr  Dose Rate: 0.92 Gm/kG/Day Infuse Over: 12 Hours; Stop After 12 Hours  Administration Instructions: Use 1.2 micron in-line filter  Entered: May  7 2022 10:00PM    Parenteral Nutrition - Adult-  Entered: May  6 2022 10:00PM    fat emulsion (Fish Oil and Plant Based) 20% Infusion-[Known as SMOFLIPID 20% Infusion]  50 Gram(s) in IV Solution 250.25 milliLiter(s) infuse at 20.9 mL/Hr  Dose Rate: 0.92 Gm/kG/Day Infuse Over: 12 Hours; Stop After 12 Hours  Administration Instructions: Use 1.2 micron in-line filter  Entered: May  6 2022 10:00PM    Diet NPO-  Entered: May  5 2022  7:40PM    
This patient has been assessed with a concern for Malnutrition and has been determined to have a diagnosis/diagnoses of Severe protein-calorie malnutrition and Underweight (BMI < 19).    This patient is being managed with:   Parenteral Nutrition - Adult-  Entered: May  8 2022 10:00PM    fat emulsion (Fish Oil and Plant Based) 20% Infusion-[Known as SMOFLIPID 20% Infusion]  50 Gram(s) in IV Solution 250 milliLiter(s) infuse at 20.9 mL/Hr  Dose Rate: 0.92 Gm/kG/Day Infuse Over: 12 Hours; Stop After 12 Hours  Administration Instructions: Use 1.2 micron in-line filter  Entered: May  8 2022 10:00PM    Parenteral Nutrition - Adult-  Entered: May  7 2022 10:00PM    fat emulsion (Fish Oil and Plant Based) 20% Infusion-[Known as SMOFLIPID 20% Infusion]  50 Gram(s) in IV Solution 250 milliLiter(s) infuse at 20.83 mL/Hr  Dose Rate: 0.92 Gm/kG/Day Infuse Over: 12 Hours; Stop After 12 Hours  Administration Instructions: Use 1.2 micron in-line filter  Entered: May  7 2022 10:00PM    Diet NPO-  Entered: May  5 2022  7:40PM    
This patient has been assessed with a concern for Malnutrition and has been determined to have a diagnosis/diagnoses of Severe protein-calorie malnutrition and Underweight (BMI < 19).    This patient is being managed with:   Parenteral Nutrition - Adult-  Entered: May 14 2022 10:00PM    fat emulsion (Fish Oil and Plant Based) 20% Infusion-[Known as SMOFLIPID 20% Infusion]  50 Gram(s) in IV Solution 250 milliLiter(s) infuse at 20.83 mL/Hr  Dose Rate: 0.92 Gm/kG/Day Infuse Over: 12 Hours; Stop After 12 Hours  Administration Instructions: Use 1.2 micron in-line filter  Entered: May 14 2022 10:00PM    Parenteral Nutrition - Adult-  Entered: May 13 2022 10:00PM    Diet NPO-  Entered: May 11 2022  2:13PM    
This patient has been assessed with a concern for Malnutrition and has been determined to have a diagnosis/diagnoses of Severe protein-calorie malnutrition and Underweight (BMI < 19).    This patient is being managed with:   Diet NPO-  Entered: May 11 2022  2:13PM    Parenteral Nutrition - Adult-  Entered: May 10 2022 10:00PM    
This patient has been assessed with a concern for Malnutrition and has been determined to have a diagnosis/diagnoses of Severe protein-calorie malnutrition and Underweight (BMI < 19).    This patient is being managed with:   Parenteral Nutrition - Adult-  Entered: May  6 2022 10:00PM    fat emulsion (Fish Oil and Plant Based) 20% Infusion-[Known as SMOFLIPID 20% Infusion]  50 Gram(s) in IV Solution 250.25 milliLiter(s) infuse at 20.9 mL/Hr  Dose Rate: 0.92 Gm/kG/Day Infuse Over: 12 Hours; Stop After 12 Hours  Administration Instructions: Use 1.2 micron in-line filter  Entered: May  6 2022 10:00PM    Parenteral Nutrition - Adult-  Entered: May  5 2022 10:00PM    Diet NPO-  Entered: May  5 2022  7:40PM    
This patient has been assessed with a concern for Malnutrition and has been determined to have a diagnosis/diagnoses of Severe protein-calorie malnutrition and Underweight (BMI < 19).    This patient is being managed with:   Parenteral Nutrition - Adult-  Entered: May  7 2022 10:00PM    fat emulsion (Fish Oil and Plant Based) 20% Infusion-[Known as SMOFLIPID 20% Infusion]  50 Gram(s) in IV Solution 250 milliLiter(s) infuse at 20.83 mL/Hr  Dose Rate: 0.92 Gm/kG/Day Infuse Over: 12 Hours; Stop After 12 Hours  Administration Instructions: Use 1.2 micron in-line filter  Entered: May  7 2022 10:00PM    Parenteral Nutrition - Adult-  Entered: May  6 2022 10:00PM    fat emulsion (Fish Oil and Plant Based) 20% Infusion-[Known as SMOFLIPID 20% Infusion]  50 Gram(s) in IV Solution 250.25 milliLiter(s) infuse at 20.9 mL/Hr  Dose Rate: 0.92 Gm/kG/Day Infuse Over: 12 Hours; Stop After 12 Hours  Administration Instructions: Use 1.2 micron in-line filter  Entered: May  6 2022 10:00PM    Diet NPO-  Entered: May  5 2022  7:40PM    
This patient has been assessed with a concern for Malnutrition and has been determined to have a diagnosis/diagnoses of Severe protein-calorie malnutrition and Underweight (BMI < 19).    This patient is being managed with:   Parenteral Nutrition - Adult-  Entered: May 10 2022 10:00PM    fat emulsion (Fish Oil and Plant Based) 20% Infusion-[Known as SMOFLIPID 20% Infusion]  50 Gram(s) in IV Solution 250 milliLiter(s) infuse at 20.83 mL/Hr  Dose Rate: 0.92 Gm/kG/Day Infuse Over: 12 Hours; Stop After 12 Hours  Administration Instructions: Use 1.2 micron in-line filter  Entered: May 10 2022 10:00PM    Diet Regular-  Entered: May 10 2022  3:24PM    
This patient has been assessed with a concern for Malnutrition and has been determined to have a diagnosis/diagnoses of Severe protein-calorie malnutrition and Underweight (BMI < 19).    This patient is being managed with:   Parenteral Nutrition - Adult-  Entered: May 10 2022 10:00PM    fat emulsion (Fish Oil and Plant Based) 20% Infusion-[Known as SMOFLIPID 20% Infusion]  50 Gram(s) in IV Solution 250 milliLiter(s) infuse at 20.83 mL/Hr  Dose Rate: 0.92 Gm/kG/Day Infuse Over: 12 Hours; Stop After 12 Hours  Administration Instructions: Use 1.2 micron in-line filter  Entered: May 10 2022 10:00PM    Parenteral Nutrition - Adult-  Entered: May  9 2022 10:00PM    fat emulsion (Fish Oil and Plant Based) 20% Infusion-[Known as SMOFLIPID 20% Infusion]  50 Gram(s) in IV Solution 250 milliLiter(s) infuse at 20.83 mL/Hr  Dose Rate: 0.92 Gm/kG/Day Infuse Over: 12 Hours; Stop After 12 Hours  Administration Instructions: Use 1.2 micron in-line filter  Entered: May  9 2022 10:00PM    Diet Clear Liquid-  Entered: May  8 2022  1:00PM    
This patient has been assessed with a concern for Malnutrition and has been determined to have a diagnosis/diagnoses of Severe protein-calorie malnutrition and Underweight (BMI < 19).    This patient is being managed with:   Parenteral Nutrition - Adult-  Entered: May 12 2022 10:00PM    fat emulsion (Fish Oil and Plant Based) 20% Infusion-[Known as SMOFLIPID 20% Infusion]  50 Gram(s) in IV Solution 250 milliLiter(s) infuse at 20.83 mL/Hr  Dose Rate: 0.92 Gm/kG/Day Infuse Over: 12 Hours; Stop After 12 Hours  Administration Instructions: Use 1.2 micron in-line filter  Entered: May 12 2022 10:00PM    Diet NPO-  Entered: May 11 2022  2:13PM    
This patient has been assessed with a concern for Malnutrition and has been determined to have a diagnosis/diagnoses of Severe protein-calorie malnutrition and Underweight (BMI < 19).    This patient is being managed with:   Parenteral Nutrition - Adult-  Entered: May 13 2022 10:00PM    fat emulsion (Fish Oil and Plant Based) 20% Infusion-[Known as SMOFLIPID 20% Infusion]  50 Gram(s) in IV Solution 250 milliLiter(s) infuse at 20.83 mL/Hr  Dose Rate: 0.92 Gm/kG/Day Infuse Over: 12 Hours; Stop After 12 Hours  Administration Instructions: Use 1.2 micron in-line filter  Entered: May 13 2022 10:00PM    Parenteral Nutrition - Adult-  Entered: May 12 2022 10:00PM    fat emulsion (Fish Oil and Plant Based) 20% Infusion-[Known as SMOFLIPID 20% Infusion]  50 Gram(s) in IV Solution 250 milliLiter(s) infuse at 20.83 mL/Hr  Dose Rate: 0.92 Gm/kG/Day Infuse Over: 12 Hours; Stop After 12 Hours  Administration Instructions: Use 1.2 micron in-line filter  Entered: May 12 2022 10:00PM    Diet NPO-  Entered: May 11 2022  2:13PM    
This patient has been assessed with a concern for Malnutrition and has been determined to have a diagnosis/diagnoses of Severe protein-calorie malnutrition and Underweight (BMI < 19).    This patient is being managed with:   Parenteral Nutrition - Adult-  Entered: May 15 2022 10:00PM    fat emulsion (Fish Oil and Plant Based) 20% Infusion-[Known as SMOFLIPID 20% Infusion]  50 Gram(s) in IV Solution 250 milliLiter(s) infuse at 20.83 mL/Hr  Dose Rate: 0.92 Gm/kG/Day Infuse Over: 12 Hours; Stop After 12 Hours  Administration Instructions: Use 1.2 micron in-line filter  Entered: May 15 2022 10:00PM    Parenteral Nutrition - Adult-  Entered: May 14 2022 10:00PM    Diet NPO-  Entered: May 11 2022  2:13PM    
This patient has been assessed with a concern for Malnutrition and has been determined to have a diagnosis/diagnoses of Severe protein-calorie malnutrition and Underweight (BMI < 19).    This patient is being managed with:   Parenteral Nutrition - Adult-  Entered: May 16 2022 10:00PM    fat emulsion (Fish Oil and Plant Based) 20% Infusion-[Known as SMOFLIPID 20% Infusion]  50 Gram(s) in IV Solution 250 milliLiter(s) infuse at 20.83 mL/Hr  Dose Rate: 0.92 Gm/kG/Day Infuse Over: 12 Hours; Stop After 12 Hours  Administration Instructions: Use 1.2 micron in-line filter  Entered: May 16 2022 10:00PM    Parenteral Nutrition - Adult-  Entered: May 15 2022 10:00PM    fat emulsion (Fish Oil and Plant Based) 20% Infusion-[Known as SMOFLIPID 20% Infusion]  50 Gram(s) in IV Solution 250 milliLiter(s) infuse at 20.83 mL/Hr  Dose Rate: 0.92 Gm/kG/Day Infuse Over: 12 Hours; Stop After 12 Hours  Administration Instructions: Use 1.2 micron in-line filter  Entered: May 15 2022 10:00PM    Diet NPO-  Entered: May 11 2022  2:13PM    
This patient has been assessed with a concern for Malnutrition and has been determined to have a diagnosis/diagnoses of Severe protein-calorie malnutrition and Underweight (BMI < 19).    This patient is being managed with:   Parenteral Nutrition - Adult-  Entered: May 17 2022 10:00PM    fat emulsion (Fish Oil and Plant Based) 20% Infusion-[Known as SMOFLIPID 20% Infusion]  50 Gram(s) in IV Solution 250 milliLiter(s) infuse at 20.83 mL/Hr  Dose Rate: 0.92 Gm/kG/Day Infuse Over: 12 Hours; Stop After 12 Hours  Administration Instructions: Use 1.2 micron in-line filter  Entered: May 17 2022 10:00PM    Parenteral Nutrition - Adult-  Entered: May 16 2022 10:00PM    fat emulsion (Fish Oil and Plant Based) 20% Infusion-[Known as SMOFLIPID 20% Infusion]  50 Gram(s) in IV Solution 250 milliLiter(s) infuse at 20.83 mL/Hr  Dose Rate: 0.92 Gm/kG/Day Infuse Over: 12 Hours; Stop After 12 Hours  Administration Instructions: Use 1.2 micron in-line filter  Entered: May 16 2022 10:00PM    Diet NPO-  Entered: May 11 2022  2:13PM    
This patient has been assessed with a concern for Malnutrition and has been determined to have a diagnosis/diagnoses of Severe protein-calorie malnutrition and Underweight (BMI < 19).    This patient is being managed with:   Parenteral Nutrition - Adult-  Entered: May 17 2022 10:00PM    fat emulsion (Fish Oil and Plant Based) 20% Infusion-[Known as SMOFLIPID 20% Infusion]  50 Gram(s) in IV Solution 250 milliLiter(s) infuse at 20.83 mL/Hr  Dose Rate: 0.92 Gm/kG/Day Infuse Over: 12 Hours; Stop After 12 Hours  Administration Instructions: Use 1.2 micron in-line filter  Entered: May 17 2022 10:00PM    Diet NPO-  Entered: May 11 2022  2:13PM    
This patient has been assessed with a concern for Malnutrition and has been determined to have a diagnosis/diagnoses of Severe protein-calorie malnutrition and Underweight (BMI < 19).    This patient is being managed with:   Parenteral Nutrition - Adult-  Entered: May 17 2022 10:00PM    fat emulsion (Fish Oil and Plant Based) 20% Infusion-[Known as SMOFLIPID 20% Infusion]  50 Gram(s) in IV Solution 250 milliLiter(s) infuse at 20.83 mL/Hr  Dose Rate: 0.92 Gm/kG/Day Infuse Over: 12 Hours; Stop After 12 Hours  Administration Instructions: Use 1.2 micron in-line filter  Entered: May 17 2022 10:00PM    Parenteral Nutrition - Adult-  Entered: May 16 2022 10:00PM    fat emulsion (Fish Oil and Plant Based) 20% Infusion-[Known as SMOFLIPID 20% Infusion]  50 Gram(s) in IV Solution 250 milliLiter(s) infuse at 20.83 mL/Hr  Dose Rate: 0.92 Gm/kG/Day Infuse Over: 12 Hours; Stop After 12 Hours  Administration Instructions: Use 1.2 micron in-line filter  Entered: May 16 2022 10:00PM    Diet NPO-  Entered: May 11 2022  2:13PM

## 2022-05-18 NOTE — DISCHARGE NOTE FOR THE EXPIRED PATIENT - HOSPITAL COURSE
This admission patient presented with a SBP.  S/P Ex LAP on 5/5.  Post Op developed YAMINI, aspiration pneumonia, and then developed hypotension likely from Septic Shock. Septic shock was from peritonitis.  He has hypoxic respiratory failure from large B/L pleural effusions.      5/18: Patient continues to do poorly, on pressors, 100% NRB, patient now having fecal contents coming through the abdominal wound.    Family transitioned to full Comfort Care.  Pressors and all medications D/C.      Patient Passed with his family at bedside

## 2022-05-18 NOTE — PROGRESS NOTE ADULT - SUBJECTIVE AND OBJECTIVE BOX
HPI: Patient transferred to the CCU for hypotension and respiratory failure    This admission patient presented with a SBP.  S/P Ex LAP on 5/5.  Post Op developed YAMINI, aspiration pneumonia, and now with hypotension likely from Septic Shock.  He has hypoxic respiratory failure from large B/L pleural effusions.      5/18: Patient continues to do poorly, on pressors, 100% NRB, patient now having fecal contents coming through the abdominal wound          PAST MEDICAL & SURGICAL HISTORY:  HTN (hypertension)      BPH (benign prostatic hyperplasia)      Ventral hernia      Colon cancer  h/o chemo 2004      Heart block      Pacemaker      HLD (hyperlipidemia)      H/O colectomy  2004      History of carpal tunnel surgery of left wrist      H/O hernia repair      History of total hip replacement, bilateral      History of appendectomy  ruptured      Cardiac pacemaker      H/O ventral hernia repair          FAMILY HISTORY:      Social Hx:    Allergies    amoxicillin (Other (Severe))  penicillin (Unknown)    Intolerances            ICU Vital Signs Last 24 Hrs  T(C): 36.5 (18 May 2022 08:40), Max: 36.6 (18 May 2022 05:00)  T(F): 97.7 (18 May 2022 08:40), Max: 97.8 (18 May 2022 05:00)  HR: 81 (18 May 2022 08:00) (79 - 108)  BP: 98/53 (17 May 2022 14:30) (69/49 - 118/65)  BP(mean): 64 (17 May 2022 14:30) (52 - 77)  ABP: 90/45 (18 May 2022 08:00) (73/37 - 110/68)  ABP(mean): 62 (18 May 2022 08:00) (3 - 85)  RR: 23 (18 May 2022 08:00) (20 - 32)  SpO2: 100% (18 May 2022 08:00) (87% - 100%)          I&O's Summary    17 May 2022 07:01  -  18 May 2022 07:00  --------------------------------------------------------  IN: 3663 mL / OUT: 2225 mL / NET: 1438 mL                              9.1    23.97 )-----------( 140      ( 18 May 2022 06:00 )             29.5       05-18    146<H>  |  117<H>  |  148<H>  ----------------------------<  161<H>  5.8<H>   |  24  |  2.79<H>    Ca    10.2<H>      18 May 2022 06:00  Phos  6.8     05-18  Mg     2.0     05-18    TPro  4.4<L>  /  Alb  1.9<L>  /  TBili  0.4  /  DBili  x   /  AST  21  /  ALT  35  /  AlkPhos  69  05-18                    MEDICATIONS  (STANDING):  albumin human 25% IVPB 50 milliLiter(s) IV Intermittent every 6 hours  chlorhexidine 4% Liquid 1 Application(s) Topical <User Schedule>  chlorhexidine 4% Liquid 1 Application(s) Topical <User Schedule>  fat emulsion (Fish Oil and Plant Based) 20% Infusion 0.92 Gm/kG/Day (20.83 mL/Hr) IV Continuous <Continuous>  heparin   Injectable 5000 Unit(s) SubCutaneous every 8 hours  meropenem  IVPB 1000 milliGRAM(s) IV Intermittent every 12 hours  methylPREDNISolone sodium succinate Injectable 40 milliGRAM(s) IV Push every 12 hours  pantoprazole  Injectable 40 milliGRAM(s) IV Push every 12 hours  Parenteral Nutrition - Adult 1 Each (104 mL/Hr) TPN Continuous <Continuous>  phenylephrine    Infusion 0.5 MICROgram(s)/kG/Min (10.2 mL/Hr) IV Continuous <Continuous>  silver sulfADIAZINE 1% Cream 1 Application(s) Topical daily  sodium chloride 0.9%. 1000 milliLiter(s) (80 mL/Hr) IV Continuous <Continuous>    MEDICATIONS  (PRN):  ondansetron Injectable 4 milliGRAM(s) IV Push every 8 hours PRN Nausea and/or Vomiting  prochlorperazine   Injectable 10 milliGRAM(s) IntraMuscular every 8 hours PRN Nasuea/Vomiting  sodium chloride 0.9% lock flush 10 milliLiter(s) IV Push every 1 hour PRN Pre/post blood products, medications, blood draw, and to maintain line patency      DVT Prophylaxis:    Advanced Directives:  Discussed with:    Visit Information: 90 min    ** Time is exclusive of billed procedures and/or teaching and/or routine family updates.

## 2022-05-18 NOTE — PROGRESS NOTE ADULT - PROVIDER SPECIALTY LIST ADULT
Hospitalist
Nephrology
Surgery
Surgery
Hospitalist
Hospitalist
Infectious Disease
Infectious Disease
Nephrology
Pulmonology
Surgery
Hospitalist
Infectious Disease
Nephrology
Nephrology
Pulmonology
Surgery
Cardiology
Hospitalist
Infectious Disease
Infectious Disease
Nephrology
Nephrology
Surgery
Hospitalist
Surgery
Critical Care
Hospitalist
Critical Care

## 2022-05-18 NOTE — PROGRESS NOTE ADULT - ASSESSMENT
89 y/o Male with h/o colon Ca s/p resection ~15y ago, SSS ss/p PM, HTN, BPH, b/l THR was admitted on 5/7 for worsening mid-epigastric pain x 2d. Mild nausea, no vomiting/diarrhea/constipation. No fever/chills at home. No clear precipitating factor. On 5/5 he underwent an exploratory laparotomy and small bowel resection. He was given cefotetan IV. Postoperative, he became hypotensive, but improved with IV fluids. Alert, but confused. He remained on cefotetan.    1. Likely fecal peritonitis. B/l pneumonia. Probable aspiration pneumonia. SBO s/p SBR. CRF stage 3. Prior colon Ca s/p resection. B/l pleural effusions. Encephalopathy. Allergy to PCN.   -leukocytosis is improving  -respiratory frail  -encephalopathy  -abdomen is worse  s/p cefotetan  -on meropenem 1 gm IV q12h # 7  -tolerating abx well so far; no side effects noted  -the patient ws placed on comfort care  -no further abx   -monitor abdomen  -monitor temps  -f/u CBC  -supportive care  2. Other issues:   -care per medicine    d/w medical team and Dr. Cruz

## 2022-05-18 NOTE — PROGRESS NOTE ADULT - ASSESSMENT
89M hx colon ca s/p resection in  here with severe abdominal  pain. Symptoms started 2days ago and got worse.  Mild nausea, no vomiting/diarrhea/constipation. No fever/chills. No clear precipitating factor. Mild tenderness "but it also hurts when I'm not touching it". Claims he had a BM yesterday. Has not really been eating due to pain. The patient had ventral hernia repair in 2021. CT abd shows high grade distal SBO.     PROBLEMS:    AC HYPOXAEMIC RESPIRATORY FAILURE  ASPIRATION PNEUMONIA  SBO  YAMINI on CKD III   Anemia likely hemodilutional + blood loss related to surgery. More likely hemodilutional.   Hypercalcmemia (multifactorial: dehydration + primary hyperparathyroidism)  HTN  BPH  SSS s/p PPM  toxic metabolic encephalopathy multifactorial   urinary retention and decreased urinary output  hypernatremia    Plan:    pat on 100% NRB & unresponsive, family @ bedside seen @ 930am, pipe poor prognosis family does not wants any aggressive measures-pipe  @ 12:05mid noon.  D/w staff

## 2022-05-18 NOTE — CHART NOTE - NSCHARTNOTEFT_GEN_A_CORE
Clinical Nutrition Follow Up Note:    *89 yo male admitted with high grad distal SBO, YAMINI on CKD now s/p ex lap on 5/5.  hypercalcemia 2/2 dehydration and primary hyperparathyroidism. Pt started on PPN on 5/5 2/2 SBO and extended time NPO.     **Current status: Pt has had complicated hospital course - has been on PPN for 12 days. Had period of fluid overload which received IV lasix - now with continued dehydration and hypernatremia. Pt transferred to CCU for pressor support; now with YAMINI and K/ Phos elevated. PPN stopped for today 2/2 abnormal renal labs.     ***New MOLST: DNR/ DNI, limited medical interventions, trial of IV fluids. Pending [facility] conversation with family and Dr. Cruz to determine nutrition support specific goals. Pt will not receive dialysis. Has chest tubes in place. Central line has been placed for TPN if within goals of care - currently NPO and PPN on hold for today. Plan for pursue comfort care measures.    *Labs Reviewed: C/w hypernatremia, but improved/  Hyperkalemia, hyperphosphatemia, with elevated BUN/ Cr.   Mg WNL.    05-18    146<H>  |  117<H>  |  148<H>  ----------------------------<  161<H>  5.8<H>   |  24  |  2.79<H>    Ca    10.2<H>      18 May 2022 06:00  Phos  6.8     05-18  Mg     2.0     05-18    TPro  4.4<L>  /  Alb  1.9<L>  /  TBili  0.4  /  DBili  x   /  AST  21  /  ALT  35  /  AlkPhos  69  05-18      BMI: BMI (kg/m2): 17.7 (05-01-22 @ 08:41)  HbA1c:   Glucose: POCT Blood Glucose.: 167 mg/dL (05-11-22 @ 07:54)    BP: 98/53 (05-17-22 @ 14:30) (69/49 - 151/79)  Lipid Panel: Date/Time: 05-17-22 @ 05:29  Triglycerides, Serum: 279    *pertinent meds: morphine    *I and O's:    05-17-22 @ 07:01  -  05-18-22 @ 07:00  --------------------------------------------------------  IN:    Fat Emulsion (Fish Oil &amp; Plant Based) 20% Infusion: 158 mL    IV PiggyBack: 150 mL    Phenylephrine: 813 mL    PPN (Peripheral Parenteral Nutrition): 2042 mL    Sodium Chloride 0.9% Bolus: 500 mL  Total IN: 3663 mL    OUT:    Chest Tube (mL): 850 mL    Chest Tube (mL): 1200 mL    Indwelling Catheter - Urethral (mL): 175 mL  Total OUT: 2225 mL    Total NET: 1438 mL    *(+) BM on  5/17; large, black stool - family denies blood transfusion; pt not on bowel regimen.    *Mahesh score of 12: stage 1 sacrum PU documented. (2+) L arm, weeping edema documented.      *Malnutrition: Pt continues to meet criteria for severe malnutrition in acute on chronic illness r/t decreased ability to consume sufficient nutr 2/2 SBO on CA AEB meeting <50% of ENN x 7 days, severe muscle/ fat wasting    Estimated Needs: based on 55Kg (wt from 5/5, bedscale wt obtained by RD)  Calories: 0822-6553 Kcal (30-35 Kcal/Kg)  Protein:   g protein (1.5-2g/Kg)  Fluids:  9251-8849 mL (30-35mL/Kg)    Weight History: no significant changes x 2 days (admission weight may be inaccurate as there is a large discrepancy)   64 kg (5/16)  62.4Kg (5/15)  66 kg (5/8)  66 Kg (5/7)  Height (cm): 175.3 (05-01-22 @ 08:41)  Weight (kg): 54.4 (05-01-22 @ 08:41)  BMI (kg/m2): 17.7 (05-01-22 @ 08:41)  BSA (m2): 1.66 (05-01-22 @ 08:41)      Recommendations:  1) Confirm goals of care regarding nutrition support and provide nutrition within [facility]. Nutrition support is not recommended due to overall declining medical status which evidenced based studies indicate EN is not effective in prolonging survival and improving quality of life. Plan to pursue comfort care measures. If comfort care pursued - RD will sign off on case.  2) D/c PPN today 2/2 abnormal renal labs    *will continue to monitor and follow up with adjustments to treatment plan prn*  Deana Glynn MS, RDN, [phone]

## 2022-05-18 NOTE — PROGRESS NOTE ADULT - REASON FOR ADMISSION
abdominal pain

## 2022-05-22 LAB
CULTURE RESULTS: SIGNIFICANT CHANGE UP
SPECIMEN SOURCE: SIGNIFICANT CHANGE UP

## 2022-05-23 DIAGNOSIS — Z51.5 ENCOUNTER FOR PALLIATIVE CARE: ICD-10-CM
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
## 2022-05-23 DIAGNOSIS — E87.0 HYPEROSMOLALITY AND HYPERNATREMIA: ICD-10-CM

## 2022-05-23 DIAGNOSIS — Z95.0 PRESENCE OF CARDIAC PACEMAKER: ICD-10-CM

## 2022-05-23 DIAGNOSIS — E43 UNSPECIFIED SEVERE PROTEIN-CALORIE MALNUTRITION: ICD-10-CM

## 2022-05-23 DIAGNOSIS — E87.5 HYPERKALEMIA: ICD-10-CM

## 2022-05-23 DIAGNOSIS — Z92.21 PERSONAL HISTORY OF ANTINEOPLASTIC CHEMOTHERAPY: ICD-10-CM

## 2022-05-23 DIAGNOSIS — D62 ACUTE POSTHEMORRHAGIC ANEMIA: ICD-10-CM

## 2022-05-23 DIAGNOSIS — K65.9 PERITONITIS, UNSPECIFIED: ICD-10-CM
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
## 2022-05-23 DIAGNOSIS — Z85.038 PERSONAL HISTORY OF OTHER MALIGNANT NEOPLASM OF LARGE INTESTINE: ICD-10-CM

## 2022-05-23 DIAGNOSIS — N17.0 ACUTE KIDNEY FAILURE WITH TUBULAR NECROSIS: ICD-10-CM

## 2022-05-23 DIAGNOSIS — Z88.0 ALLERGY STATUS TO PENICILLIN: ICD-10-CM

## 2022-05-23 DIAGNOSIS — R65.21 SEVERE SEPSIS WITH SEPTIC SHOCK: ICD-10-CM

## 2022-05-23 DIAGNOSIS — A41.9 SEPSIS, UNSPECIFIED ORGANISM: ICD-10-CM

## 2022-05-23 DIAGNOSIS — E87.2 ACIDOSIS: ICD-10-CM

## 2022-05-23 DIAGNOSIS — E78.5 HYPERLIPIDEMIA, UNSPECIFIED: ICD-10-CM
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
## 2022-05-23 DIAGNOSIS — J96.01 ACUTE RESPIRATORY FAILURE WITH HYPOXIA: ICD-10-CM

## 2022-05-23 DIAGNOSIS — T80.818A EXTRAVASATION OF OTHER VESICANT AGENT, INITIAL ENCOUNTER: ICD-10-CM

## 2022-05-23 DIAGNOSIS — Z66 DO NOT RESUSCITATE: ICD-10-CM

## 2022-05-23 DIAGNOSIS — I50.31 ACUTE DIASTOLIC (CONGESTIVE) HEART FAILURE: ICD-10-CM

## 2022-05-23 DIAGNOSIS — Z96.643 PRESENCE OF ARTIFICIAL HIP JOINT, BILATERAL: ICD-10-CM

## 2022-05-23 DIAGNOSIS — K56.50 INTESTINAL ADHESIONS [BANDS], UNSPECIFIED AS TO PARTIAL VERSUS COMPLETE OBSTRUCTION: ICD-10-CM

## 2022-05-23 DIAGNOSIS — E86.0 DEHYDRATION: ICD-10-CM

## 2022-05-23 DIAGNOSIS — G93.41 METABOLIC ENCEPHALOPATHY: ICD-10-CM

## 2022-05-23 DIAGNOSIS — N40.0 BENIGN PROSTATIC HYPERPLASIA WITHOUT LOWER URINARY TRACT SYMPTOMS: ICD-10-CM

## 2022-05-23 DIAGNOSIS — J69.0 PNEUMONITIS DUE TO INHALATION OF FOOD AND VOMIT: ICD-10-CM

## 2022-05-23 DIAGNOSIS — K92.2 GASTROINTESTINAL HEMORRHAGE, UNSPECIFIED: ICD-10-CM

## 2022-05-23 DIAGNOSIS — I13.0 HYPERTENSIVE HEART AND CHRONIC KIDNEY DISEASE WITH HEART FAILURE AND STAGE 1 THROUGH STAGE 4 CHRONIC KIDNEY DISEASE, OR UNSPECIFIED CHRONIC KIDNEY DISEASE: ICD-10-CM

## 2022-05-23 DIAGNOSIS — Z88.1 ALLERGY STATUS TO OTHER ANTIBIOTIC AGENTS STATUS: ICD-10-CM

## 2022-05-23 DIAGNOSIS — R33.9 RETENTION OF URINE, UNSPECIFIED: ICD-10-CM

## 2022-05-23 DIAGNOSIS — R18.8 OTHER ASCITES: ICD-10-CM

## 2022-05-23 DIAGNOSIS — J90 PLEURAL EFFUSION, NOT ELSEWHERE CLASSIFIED: ICD-10-CM

## 2022-05-23 DIAGNOSIS — N18.30 CHRONIC KIDNEY DISEASE, STAGE 3 UNSPECIFIED: ICD-10-CM

## 2022-05-23 DIAGNOSIS — E21.0 PRIMARY HYPERPARATHYROIDISM: ICD-10-CM

## 2024-10-17 NOTE — DIETITIAN INITIAL EVALUATION ADULT - NS FNS DAYS INADEQUATE GEN ALL CORE
Pt returns to the office for follow up on right knee injection given 9.5.24. Pt states the injection has been working well thus far. Pt states he was on a walk a coupe days ago and believes he twisted his knee or did something to it. Pt states he has no pain but his knee does not feel the same as before. Pt also states his RT knee is swollen.   7